# Patient Record
Sex: MALE | Race: WHITE | Employment: OTHER | URBAN - METROPOLITAN AREA
[De-identification: names, ages, dates, MRNs, and addresses within clinical notes are randomized per-mention and may not be internally consistent; named-entity substitution may affect disease eponyms.]

---

## 2017-09-19 ENCOUNTER — HOSPITAL ENCOUNTER (OUTPATIENT)
Dept: MRI IMAGING | Age: 77
Discharge: HOME | End: 2017-09-19
Attending: OPHTHALMOLOGY
Payer: MEDICARE

## 2017-09-19 DIAGNOSIS — H47.293 PALLOR OF OPTIC DISC, BILATERAL: ICD-10-CM

## 2017-09-19 DIAGNOSIS — H53.433 ARCUATE VISUAL FIELD DEFECT, BILATERAL: ICD-10-CM

## 2017-09-19 LAB — CREAT BLD-MCNC: 1.6 MG/DL (ref 0.8–1.5)

## 2017-09-19 PROCEDURE — A9577 INJ MULTIHANCE: HCPCS | Performed by: OPHTHALMOLOGY

## 2017-09-19 PROCEDURE — 82565 ASSAY OF CREATININE: CPT

## 2017-09-19 PROCEDURE — 70553 MRI BRAIN STEM W/O & W/DYE: CPT

## 2017-09-19 PROCEDURE — 74011250636 HC RX REV CODE- 250/636: Performed by: OPHTHALMOLOGY

## 2017-09-19 RX ORDER — SODIUM CHLORIDE 0.9 % (FLUSH) 0.9 %
10 SYRINGE (ML) INJECTION
Status: COMPLETED | OUTPATIENT
Start: 2017-09-19 | End: 2017-09-19

## 2017-09-19 RX ADMIN — GADOBENATE DIMEGLUMINE 15 ML: 529 INJECTION, SOLUTION INTRAVENOUS at 18:50

## 2017-09-19 RX ADMIN — Medication 10 ML: at 18:50

## 2019-12-26 PROBLEM — E27.3 ADRENAL INSUFFICIENCY DUE TO CANCER THERAPY (HCC): Status: ACTIVE | Noted: 2019-12-26

## 2019-12-26 PROBLEM — N18.30 CHRONIC KIDNEY DISEASE, STAGE III (MODERATE) (HCC): Status: ACTIVE | Noted: 2019-12-26

## 2019-12-26 PROBLEM — I25.10 ATHEROSCLEROSIS OF NATIVE CORONARY ARTERY OF NATIVE HEART WITHOUT ANGINA PECTORIS: Status: ACTIVE | Noted: 2019-12-26

## 2020-02-03 PROBLEM — I51.9 LV DYSFUNCTION: Status: ACTIVE | Noted: 2020-02-03

## 2020-02-27 ENCOUNTER — HOSPITAL ENCOUNTER (OUTPATIENT)
Dept: LAB | Age: 80
Discharge: HOME | End: 2020-02-27
Payer: MEDICARE

## 2020-02-27 DIAGNOSIS — N18.30 CHRONIC KIDNEY DISEASE, STAGE III (MODERATE) (HCC): ICD-10-CM

## 2020-02-27 DIAGNOSIS — I25.10 ATHEROSCLEROSIS OF NATIVE CORONARY ARTERY OF NATIVE HEART WITHOUT ANGINA PECTORIS: ICD-10-CM

## 2020-02-27 PROBLEM — R94.39 ABNORMAL CARDIOVASCULAR STRESS TEST: Status: ACTIVE | Noted: 2020-02-27

## 2020-02-27 LAB
ANION GAP SERPL CALC-SCNC: 6 MMOL/L (ref 7–16)
BASOPHILS # BLD: 0 K/UL (ref 0–0.2)
BASOPHILS NFR BLD: 0 % (ref 0–2)
BUN SERPL-MCNC: 27 MG/DL (ref 8–23)
CALCIUM SERPL-MCNC: 9 MG/DL (ref 8.3–10.4)
CHLORIDE SERPL-SCNC: 115 MMOL/L (ref 98–107)
CO2 SERPL-SCNC: 24 MMOL/L (ref 21–32)
CREAT SERPL-MCNC: 1.6 MG/DL (ref 0.8–1.5)
DIFFERENTIAL METHOD BLD: ABNORMAL
EOSINOPHIL # BLD: 0.2 K/UL (ref 0–0.8)
EOSINOPHIL NFR BLD: 2 % (ref 0.5–7.8)
ERYTHROCYTE [DISTWIDTH] IN BLOOD BY AUTOMATED COUNT: 13.9 % (ref 11.9–14.6)
GLUCOSE SERPL-MCNC: 82 MG/DL (ref 65–100)
HCT VFR BLD AUTO: 40.3 % (ref 41.1–50.3)
HGB BLD-MCNC: 13.2 G/DL (ref 13.6–17.2)
IMM GRANULOCYTES # BLD AUTO: 0.1 K/UL (ref 0–0.5)
IMM GRANULOCYTES NFR BLD AUTO: 1 % (ref 0–5)
LYMPHOCYTES # BLD: 0.6 K/UL (ref 0.5–4.6)
LYMPHOCYTES NFR BLD: 7 % (ref 13–44)
MCH RBC QN AUTO: 33.9 PG (ref 26.1–32.9)
MCHC RBC AUTO-ENTMCNC: 32.8 G/DL (ref 31.4–35)
MCV RBC AUTO: 103.6 FL (ref 79.6–97.8)
MONOCYTES # BLD: 0.7 K/UL (ref 0.1–1.3)
MONOCYTES NFR BLD: 8 % (ref 4–12)
NEUTS SEG # BLD: 6.9 K/UL (ref 1.7–8.2)
NEUTS SEG NFR BLD: 83 % (ref 43–78)
NRBC # BLD: 0 K/UL (ref 0–0.2)
PLATELET # BLD AUTO: 166 K/UL (ref 150–450)
PMV BLD AUTO: 10.5 FL (ref 9.4–12.3)
POTASSIUM SERPL-SCNC: 3.6 MMOL/L (ref 3.5–5.1)
RBC # BLD AUTO: 3.89 M/UL (ref 4.23–5.6)
SODIUM SERPL-SCNC: 145 MMOL/L (ref 136–145)
WBC # BLD AUTO: 8.4 K/UL (ref 4.3–11.1)

## 2020-02-27 PROCEDURE — 85025 COMPLETE CBC W/AUTO DIFF WBC: CPT

## 2020-02-27 PROCEDURE — 80048 BASIC METABOLIC PNL TOTAL CA: CPT

## 2020-02-27 PROCEDURE — 36415 COLL VENOUS BLD VENIPUNCTURE: CPT

## 2020-02-27 NOTE — PROGRESS NOTES
Tell him kidney function is not significantly changed. . Tell scheduling he needs to come in few hrs early for IV fluid

## 2020-02-27 NOTE — PROGRESS NOTES
Davidson Garcia at [facility] DT Cath Lab of above. She states pt should arrive at 6:00 am, as scheduled. She states she may move cath to later in the day, after IV hydration. She states pt should increase fluids over the weekend, and he may not need IV hydration. She states they will check creatine when pt arrives for appt. Helena Copiague state she will inform pt of need for hydration, tomorrow. Advised pt of Dr. Loree Melendez instructions and Ning's instructions, as above. Advised pt that Helena Copiague will be calling with further instructions, tomorrow. Pt verbalized understanding. See triage note.

## 2020-02-28 NOTE — PROGRESS NOTES
Patient pre-assessment complete for [facility] poss with Dr Nicolasa Coppola scheduled for 3/2/20 at 8am, arrival time 6am - HYDRATION. Patient verified using . Patient instructed to bring all home medications in labeled bottles on the day of procedure. NPO status reinforced. Patient informed to take a full dose aspirin 325mg  or 81 mg x 4 on the day of procedure. Instructed they can take all other medications excluding vitamins & supplements. Patient verbalizes understanding of all instructions & denies any questions at this time.

## 2020-03-02 ENCOUNTER — HOSPITAL ENCOUNTER (OUTPATIENT)
Dept: CARDIAC CATH/INVASIVE PROCEDURES | Age: 80
Discharge: HOME | End: 2020-03-02
Attending: INTERNAL MEDICINE | Admitting: INTERNAL MEDICINE
Payer: MEDICARE

## 2020-03-02 VITALS
HEART RATE: 81 BPM | HEIGHT: 74 IN | WEIGHT: 185 LBS | SYSTOLIC BLOOD PRESSURE: 127 MMHG | DIASTOLIC BLOOD PRESSURE: 76 MMHG | RESPIRATION RATE: 14 BRPM | OXYGEN SATURATION: 97 % | BODY MASS INDEX: 23.74 KG/M2

## 2020-03-02 LAB
ANION GAP SERPL CALC-SCNC: 7 MMOL/L (ref 7–16)
ATRIAL RATE: 73 BPM
BUN SERPL-MCNC: 29 MG/DL (ref 8–23)
CALCIUM SERPL-MCNC: 8.8 MG/DL (ref 8.3–10.4)
CALCULATED P AXIS, ECG09: 68 DEGREES
CALCULATED R AXIS, ECG10: -65 DEGREES
CALCULATED T AXIS, ECG11: 74 DEGREES
CHLORIDE SERPL-SCNC: 114 MMOL/L (ref 98–107)
CO2 SERPL-SCNC: 21 MMOL/L (ref 21–32)
CREAT SERPL-MCNC: 1.42 MG/DL (ref 0.8–1.5)
DIAGNOSIS, 93000: NORMAL
GLUCOSE SERPL-MCNC: 109 MG/DL (ref 65–100)
P-R INTERVAL, ECG05: 180 MS
POTASSIUM SERPL-SCNC: 3.7 MMOL/L (ref 3.5–5.1)
Q-T INTERVAL, ECG07: 434 MS
QRS DURATION, ECG06: 118 MS
QTC CALCULATION (BEZET), ECG08: 458 MS
SODIUM SERPL-SCNC: 142 MMOL/L (ref 136–145)
VENTRICULAR RATE, ECG03: 67 BPM

## 2020-03-02 PROCEDURE — 74011000250 HC RX REV CODE- 250: Performed by: INTERNAL MEDICINE

## 2020-03-02 PROCEDURE — C1760 CLOSURE DEV, VASC: HCPCS

## 2020-03-02 PROCEDURE — 74011250637 HC RX REV CODE- 250/637: Performed by: INTERNAL MEDICINE

## 2020-03-02 PROCEDURE — 99152 MOD SED SAME PHYS/QHP 5/>YRS: CPT

## 2020-03-02 PROCEDURE — 93459 L HRT ART/GRFT ANGIO: CPT

## 2020-03-02 PROCEDURE — 74011250636 HC RX REV CODE- 250/636: Performed by: INTERNAL MEDICINE

## 2020-03-02 PROCEDURE — 80048 BASIC METABOLIC PNL TOTAL CA: CPT

## 2020-03-02 PROCEDURE — C1894 INTRO/SHEATH, NON-LASER: HCPCS

## 2020-03-02 PROCEDURE — 93005 ELECTROCARDIOGRAM TRACING: CPT | Performed by: INTERNAL MEDICINE

## 2020-03-02 PROCEDURE — 74011636320 HC RX REV CODE- 636/320: Performed by: INTERNAL MEDICINE

## 2020-03-02 PROCEDURE — 77030016699 HC CATH ANGI DX INFN1 CARD -A

## 2020-03-02 RX ORDER — HEPARIN SODIUM 200 [USP'U]/100ML
3 INJECTION, SOLUTION INTRAVENOUS CONTINUOUS
Status: DISCONTINUED | OUTPATIENT
Start: 2020-03-02 | End: 2020-03-02 | Stop reason: HOSPADM

## 2020-03-02 RX ORDER — CARVEDILOL 3.12 MG/1
3.12 TABLET ORAL 2 TIMES DAILY WITH MEALS
Qty: 60 TAB | Refills: 5 | Status: SHIPPED | OUTPATIENT
Start: 2020-03-02 | End: 2020-01-01 | Stop reason: SDUPTHER

## 2020-03-02 RX ORDER — GUAIFENESIN 100 MG/5ML
81 LIQUID (ML) ORAL
Status: SHIPPED | COMMUNITY
Start: 2020-03-02

## 2020-03-02 RX ORDER — SODIUM CHLORIDE 9 MG/ML
75 INJECTION, SOLUTION INTRAVENOUS CONTINUOUS
Status: DISCONTINUED | OUTPATIENT
Start: 2020-03-02 | End: 2020-03-02 | Stop reason: HOSPADM

## 2020-03-02 RX ORDER — CARVEDILOL 3.12 MG/1
3.12 TABLET ORAL 2 TIMES DAILY WITH MEALS
Status: DISCONTINUED | OUTPATIENT
Start: 2020-03-02 | End: 2020-03-02 | Stop reason: HOSPADM

## 2020-03-02 RX ORDER — MIDAZOLAM HYDROCHLORIDE 1 MG/ML
.5-2 INJECTION, SOLUTION INTRAMUSCULAR; INTRAVENOUS
Status: DISCONTINUED | OUTPATIENT
Start: 2020-03-02 | End: 2020-03-02 | Stop reason: HOSPADM

## 2020-03-02 RX ORDER — GUAIFENESIN 100 MG/5ML
81-324 LIQUID (ML) ORAL ONCE
Status: COMPLETED | OUTPATIENT
Start: 2020-03-02 | End: 2020-03-02

## 2020-03-02 RX ORDER — LIDOCAINE HYDROCHLORIDE 10 MG/ML
2-20 INJECTION, SOLUTION EPIDURAL; INFILTRATION; INTRACAUDAL; PERINEURAL ONCE
Status: COMPLETED | OUTPATIENT
Start: 2020-03-02 | End: 2020-03-02

## 2020-03-02 RX ADMIN — HEPARIN SODIUM 3 ML/HR: 5000 INJECTION, SOLUTION INTRAVENOUS; SUBCUTANEOUS at 08:17

## 2020-03-02 RX ADMIN — LIDOCAINE HYDROCHLORIDE 10 ML: 10 INJECTION, SOLUTION EPIDURAL; INFILTRATION; INTRACAUDAL; PERINEURAL at 08:17

## 2020-03-02 RX ADMIN — IOPAMIDOL 90 ML: 755 INJECTION, SOLUTION INTRAVENOUS at 08:31

## 2020-03-02 RX ADMIN — ASPIRIN 81 MG 324 MG: 81 TABLET ORAL at 06:53

## 2020-03-02 RX ADMIN — MIDAZOLAM 2 MG: 1 INJECTION INTRAMUSCULAR; INTRAVENOUS at 08:13

## 2020-03-02 RX ADMIN — SODIUM CHLORIDE 75 ML/HR: 900 INJECTION, SOLUTION INTRAVENOUS at 07:01

## 2020-03-02 NOTE — PROCEDURES
[facility] 
CARDIAC CATH Name:  Kathleen Steele 
MR#:  [id number] :  1940 ACCOUNT #:  [de-identified] DATE OF SERVICE:  2020 PROCEDURES PERFORMED:  Left heart cath, selective coronary angiography, left ventriculogram, saphenous vein angiography x3, LIMA angiography x1 with Angio-Seal closure. PREOPERATIVE DIAGNOSES:  New-onset ischemic cardiomyopathy with known history of coronary disease and abnormal nuclear stress test. 
 
POSTOPERATIVE DIAGNOSES:  Ischemic cardiomyopathy with four of four patent grafts. SURGEON:  Kellen Morrison MD 
 
ASSISTANT:  None ESTIMATED BLOOD LOSS:  3 mL. SPECIMENS REMOVED:  None. COMPLICATIONS:  None. IMPLANTS:  None. ANESTHESIA:  A 2 mg of Versed given from 08:00 to 08:30 by Gay Vargas. ACCESS:  Paulie left 4, Paulie right 4, and straight pigtail were used for diagnostic images. CONTRAST:  A total of 90 mL of contrast was used. HEMODYNAMICS:  Aortic pressure 148/71. LVEDP of 15. FINDINGS:  Left ventriculogram done in ROBLERO projection shows moderate to severely dilated left atrium with segmental wall motion abnormalities and overall EF estimated at approximately 30% with no gradient on pullback. Left main arises normally, bifurcates into LAD and circumflex. Left main is moderate size with no significant disease. LAD arising off the left main, courses for a short distance and then appears to have a severe stenosis after a small diagonal and septal  branch. There is then competitive filling from a patent LIMA. Circumflex artery in the AV groove has a high-grade 90-95% proximal stenosis with some antegrade filling of an OM1 and then washed out from competitive filling from a graft to an OM1. Right coronary artery is totally occluded proximally. This is a .  
 
GRAFT ANATOMY: 
 
LIMA arises off the left subclavian somewhat further down the subclavian artery than usual.  It is a normal-caliber size with no disease. The distal insertion in the LAD is free of disease. There is excellent runoff into the mid-distal LAD to the apex. Saphenous vein bypass graft to OM1 is patent with good proximal and distal anastomosis and good runoff in the OM1 vessel. There is no significant disease in the graft or in the runoff vessel. Saphenous vein bypass graft to OM2 is widely patent with good proximal and distal anastomosis and excellent runoff in the OM2 and backfilling of the native circumflex in a  retrograde fashion. Saphenous vein bypass graft to the right-sided PDA is patent with good proximal and distal anastomosis. This is a large caliber vein graft tying in a very small PDA. The small PDA though fills appropriately, there is backfilling of the distal right and posterior lateral. 
 
CONCLUSIONS:  Stable coronary anatomy with continued patency of four of four bypass grafts. The patient has developed an ischemic cardiomyopathy with EF estimated at 30%. He will be started on systolic heart failure medicines inclusive of Coreg and Entresto. Danni Fonseca MD 
 
 
[id number] 
D:  03/02/2020 8:51 T:  03/02/2020 13:39 
JOB #:  [id number]

## 2020-03-02 NOTE — PROGRESS NOTES
Patient received to [address] [place] [address] room # 10  Ambulatory from [facility]. Patient scheduled for [facility] today with Dr Xavier Ibarra [facility]. Procedure reviewed & questions answered, voiced good understanding consent obtained & placed on chart. All medications and medical history reviewed. Will prep patient per orders. Patient & family updated on plan of care. The patient has a fraility score of 3-MANAGING WELL, based on patient A&Ox3, patient able to ambulate to room without difficulty.

## 2020-03-02 NOTE — PROCEDURES
Cardiac Catheterization Procedure Note Patient ID: 
 
 Name: Lori Jimenez Medical Record Number: [id number] YOB: 1940 Date of Procedure: 3/2/2020 Pre-procedure Diagnosis:  Atypical Angina Post-procedure Diagnosis: Congestive Heart Failure Reason for Procedure: Suspected CAD Blood loss less than 5 ml Sedation. Pt received 2 mg versed and 0 mcg fentanyl for monitored conscious sedation from 800to 830. Nurse terri Specimen: None No complications No assistants Time out, Mallampati, and ASA performed Procedure: After informed consent, patient was prepped and draped in the usual sterile fashion. femoral approach was used. 90cc Visipaque contrast were utilized for the entire procedure. angioseal closure device used FINDINGS Left Ventricle: 30% LVEDP: 15 Left Main:ok Left Anterior descending coronary artery: severe mLAD stenosis Left Circumflex coronary artery: severe prox stenosis Right coronary artery:  prox rca Graft anatomy: lima to lad patent Sv to om1 patent Sv to om2 patent Sv to pda patent Intervention if done: na 
 
Conclusions: 4/4 patent grafts ischemic cmp Recommentations: HFrEF (heart failure reduced EF) meds No complications Family and or significant other were sought out and discussion of the procedure and findings took place. Procedure and findings including pertinent sequele were discussed with the patient immediately post procedure. Opportunity to ask questions was offered. If no one was available in the post procedure waiting area, I can be reached thru paging system or at [phone].  
 
 
 
 
Signed By: Charan Desouza MD

## 2020-03-02 NOTE — PROGRESS NOTES
Report received from [address] [place] [address]. Procedural findings communicated. Intra procedural  medication administration reviewed. Progression of care discussed. Patient received into [id number] [facility] 4 post sheath removal.  
 
Access site without bleeding or swelling Dressing dry and intact Patient instructed to limit movement to right lower extremity Routine post procedural vital signs and site assessment initiated

## 2020-03-02 NOTE — DISCHARGE INSTRUCTIONS
HEART CATHETERIZATION/ANGIOGRAPHY DISCHARGE INSTRUCTIONS    1. Check puncture site frequently for swelling or bleeding. If there is any bleeding, lie down and apply pressure over the area with a clean towel or washcloth. Notify your doctor for any redness, swelling, drainage, or oozing from the puncture site. Notify your doctor for any fever or chills. 2. If the extremity becomes cold, numb, or painful call [place] [facility] at [phone].  3. Activity should be limited for the next 24 hours. Climb stairs as little as possible and avoid any stooping, bending, or strenuous activity for the following 48 hours. No heavy lifting (anything over 10 pounds) for 3 days. No driving for 48 hours. 4. You may resume your usual diet. Drink more fluids than usual.  5. Have a responsible person drive you home and stay with you for at least 24 hours after your heart catheterization/angiography. 6. You may remove bandage from your Right groin in 24 hours. You may shower in 24 hours. No tub baths, hot tubs, or swimming for 1 week. Do not place any lotions, creams, powders, or ointments over puncture site for 1 week. You may place a clean band-aid over the puncture site each day for 5 days. Remove at bedtime. I have read the above instructions and have had the opportunity to ask questions.

## 2020-03-02 NOTE — PROGRESS NOTES
TRANSFER - OUT REPORT: 
 
Verbal report given to [facility] (1-RH), RN(name) on Tyrone Favorite  being transferred to CPRU(unit) for routine progression of care Report consisted of patients Situation, Background, Assessment and  
Recommendations(SBAR). Information from the following report(s) SBAR was reviewed with the receiving nurse. [facility] w/ Dr. Janessa Mcgheek No interventions R groin 6 Fr angioseal 
2 mg versed

## 2020-11-12 PROBLEM — N18.4 CHRONIC RENAL FAILURE, STAGE 4 (SEVERE) (HCC): Status: ACTIVE | Noted: 2020-01-01

## 2020-11-12 PROBLEM — D64.9 ANEMIA: Status: ACTIVE | Noted: 2020-01-01

## 2020-11-12 PROBLEM — I25.5 ISCHEMIC CARDIOMYOPATHY: Status: ACTIVE | Noted: 2020-01-01

## 2021-01-01 ENCOUNTER — APPOINTMENT (OUTPATIENT)
Dept: ULTRASOUND IMAGING | Age: 81
DRG: 501 | End: 2021-01-01
Attending: STUDENT IN AN ORGANIZED HEALTH CARE EDUCATION/TRAINING PROGRAM
Payer: MEDICARE

## 2021-01-01 ENCOUNTER — APPOINTMENT (OUTPATIENT)
Dept: GENERAL RADIOLOGY | Age: 81
DRG: 501 | End: 2021-01-01
Attending: EMERGENCY MEDICINE
Payer: MEDICARE

## 2021-01-01 ENCOUNTER — HOSPITAL ENCOUNTER (INPATIENT)
Age: 81
LOS: 8 days | Discharge: NURSING, REHAB OR LONG-TERM CARE | DRG: 501 | End: 2021-04-08
Attending: EMERGENCY MEDICINE | Admitting: STUDENT IN AN ORGANIZED HEALTH CARE EDUCATION/TRAINING PROGRAM
Payer: MEDICARE

## 2021-01-01 ENCOUNTER — ANESTHESIA EVENT (OUTPATIENT)
Dept: SURGERY | Age: 81
DRG: 501 | End: 2021-01-01
Payer: MEDICARE

## 2021-01-01 ENCOUNTER — PATIENT OUTREACH (OUTPATIENT)
Dept: CASE MANAGEMENT | Age: 81
End: 2021-01-01

## 2021-01-01 ENCOUNTER — ANESTHESIA (OUTPATIENT)
Dept: SURGERY | Age: 81
DRG: 501 | End: 2021-01-01
Payer: MEDICARE

## 2021-01-01 ENCOUNTER — APPOINTMENT (OUTPATIENT)
Dept: GENERAL RADIOLOGY | Age: 81
End: 2021-01-01
Attending: EMERGENCY MEDICINE
Payer: MEDICARE

## 2021-01-01 ENCOUNTER — HOSPITAL ENCOUNTER (OUTPATIENT)
Dept: LAB | Age: 81
Discharge: HOME | End: 2021-04-11

## 2021-01-01 ENCOUNTER — HOSPITAL ENCOUNTER (EMERGENCY)
Age: 81
Discharge: HOME | End: 2021-03-08
Attending: EMERGENCY MEDICINE
Payer: MEDICARE

## 2021-01-01 VITALS
WEIGHT: 170 LBS | TEMPERATURE: 97.9 F | BODY MASS INDEX: 21.14 KG/M2 | RESPIRATION RATE: 19 BRPM | DIASTOLIC BLOOD PRESSURE: 66 MMHG | SYSTOLIC BLOOD PRESSURE: 131 MMHG | HEART RATE: 89 BPM | HEIGHT: 75 IN | OXYGEN SATURATION: 96 %

## 2021-01-01 VITALS
BODY MASS INDEX: 20.39 KG/M2 | TEMPERATURE: 98 F | WEIGHT: 164 LBS | RESPIRATION RATE: 16 BRPM | SYSTOLIC BLOOD PRESSURE: 135 MMHG | HEIGHT: 75 IN | OXYGEN SATURATION: 100 % | HEART RATE: 74 BPM | DIASTOLIC BLOOD PRESSURE: 67 MMHG

## 2021-01-01 DIAGNOSIS — N18.4 ACUTE RENAL FAILURE SUPERIMPOSED ON STAGE 4 CHRONIC KIDNEY DISEASE, UNSPECIFIED ACUTE RENAL FAILURE TYPE (HCC): Primary | ICD-10-CM

## 2021-01-01 DIAGNOSIS — I25.5 ISCHEMIC CARDIOMYOPATHY: ICD-10-CM

## 2021-01-01 DIAGNOSIS — T14.8XXA MULTIPLE SKIN TEARS: Primary | ICD-10-CM

## 2021-01-01 DIAGNOSIS — R09.89 RALES: ICD-10-CM

## 2021-01-01 DIAGNOSIS — I50.43 ACUTE ON CHRONIC COMBINED SYSTOLIC AND DIASTOLIC CONGESTIVE HEART FAILURE (HCC): ICD-10-CM

## 2021-01-01 DIAGNOSIS — I48.0 PAF (PAROXYSMAL ATRIAL FIBRILLATION) (HCC): ICD-10-CM

## 2021-01-01 DIAGNOSIS — M71.10 SEPTIC BURSITIS: ICD-10-CM

## 2021-01-01 DIAGNOSIS — J84.10 PULMONARY INTERSTITIAL FIBROSIS (HCC): ICD-10-CM

## 2021-01-01 DIAGNOSIS — J84.10 PULMONARY FIBROSIS (HCC): ICD-10-CM

## 2021-01-01 DIAGNOSIS — Z95.1 S/P CABG X 4: ICD-10-CM

## 2021-01-01 DIAGNOSIS — N17.9 ACUTE RENAL FAILURE SUPERIMPOSED ON STAGE 4 CHRONIC KIDNEY DISEASE, UNSPECIFIED ACUTE RENAL FAILURE TYPE (HCC): Primary | ICD-10-CM

## 2021-01-01 DIAGNOSIS — S50.01XA TRAUMATIC HEMATOMA OF RIGHT ELBOW, INITIAL ENCOUNTER: ICD-10-CM

## 2021-01-01 DIAGNOSIS — M25.561 ACUTE PAIN OF RIGHT KNEE: ICD-10-CM

## 2021-01-01 DIAGNOSIS — I95.1 ORTHOSTATIC HYPOTENSION: ICD-10-CM

## 2021-01-01 DIAGNOSIS — N17.9 AKI (ACUTE KIDNEY INJURY) (HCC): ICD-10-CM

## 2021-01-01 LAB
ALBUMIN SERPL-MCNC: 1.9 G/DL (ref 3.2–4.6)
ALBUMIN SERPL-MCNC: 1.9 G/DL (ref 3.2–4.6)
ALBUMIN SERPL-MCNC: 2.1 G/DL (ref 3.2–4.6)
ALBUMIN SERPL-MCNC: 2.5 G/DL (ref 3.2–4.6)
ALBUMIN/GLOB SERPL: 0.8 {RATIO} (ref 1.2–3.5)
ALBUMIN/GLOB SERPL: 0.8 {RATIO} (ref 1.2–3.5)
ALP SERPL-CCNC: 62 U/L (ref 50–136)
ALP SERPL-CCNC: 76 U/L (ref 50–136)
ALT SERPL-CCNC: 20 U/L (ref 12–65)
ALT SERPL-CCNC: 37 U/L (ref 12–65)
ANION GAP SERPL CALC-SCNC: 10 MMOL/L (ref 7–16)
ANION GAP SERPL CALC-SCNC: 10 MMOL/L (ref 7–16)
ANION GAP SERPL CALC-SCNC: 11 MMOL/L (ref 7–16)
ANION GAP SERPL CALC-SCNC: 12 MMOL/L (ref 7–16)
ANION GAP SERPL CALC-SCNC: 13 MMOL/L (ref 7–16)
ANION GAP SERPL CALC-SCNC: 7 MMOL/L (ref 7–16)
ANION GAP SERPL CALC-SCNC: 8 MMOL/L (ref 7–16)
ANION GAP SERPL CALC-SCNC: 8 MMOL/L (ref 7–16)
APPEARANCE UR: CLEAR
APPEARANCE UR: CLEAR
AST SERPL-CCNC: 13 U/L (ref 15–37)
AST SERPL-CCNC: 39 U/L (ref 15–37)
ATRIAL RATE: 50 BPM
BACTERIA SPEC CULT: ABNORMAL
BACTERIA SPEC CULT: ABNORMAL
BACTERIA SPEC CULT: NORMAL
BACTERIA URNS QL MICRO: 0 /HPF
BASOPHILS # BLD: 0 K/UL (ref 0–0.2)
BASOPHILS NFR BLD: 0 % (ref 0–2)
BILIRUB DIRECT SERPL-MCNC: 0.1 MG/DL
BILIRUB SERPL-MCNC: 0.3 MG/DL (ref 0.2–1.1)
BILIRUB SERPL-MCNC: 0.8 MG/DL (ref 0.2–1.1)
BILIRUB UR QL: NEGATIVE
BILIRUB UR QL: NEGATIVE
BNP SERPL-MCNC: ABNORMAL PG/ML
BUN SERPL-MCNC: 38 MG/DL (ref 8–23)
BUN SERPL-MCNC: 43 MG/DL (ref 8–23)
BUN SERPL-MCNC: 45 MG/DL (ref 8–23)
BUN SERPL-MCNC: 47 MG/DL (ref 8–23)
BUN SERPL-MCNC: 48 MG/DL (ref 8–23)
BUN SERPL-MCNC: 54 MG/DL (ref 8–23)
BUN SERPL-MCNC: 54 MG/DL (ref 8–23)
BUN SERPL-MCNC: 58 MG/DL (ref 8–23)
BUN SERPL-MCNC: 59 MG/DL (ref 8–23)
BUN SERPL-MCNC: 66 MG/DL (ref 8–23)
BUN SERPL-MCNC: 71 MG/DL (ref 8–23)
BUN SERPL-MCNC: 77 MG/DL (ref 8–23)
BUN SERPL-MCNC: 80 MG/DL (ref 8–23)
CALCIUM SERPL-MCNC: 7.7 MG/DL (ref 8.3–10.4)
CALCIUM SERPL-MCNC: 7.8 MG/DL (ref 8.3–10.4)
CALCIUM SERPL-MCNC: 7.9 MG/DL (ref 8.3–10.4)
CALCIUM SERPL-MCNC: 8 MG/DL (ref 8.3–10.4)
CALCIUM SERPL-MCNC: 8 MG/DL (ref 8.3–10.4)
CALCIUM SERPL-MCNC: 8.1 MG/DL (ref 8.3–10.4)
CALCIUM SERPL-MCNC: 8.1 MG/DL (ref 8.3–10.4)
CALCIUM SERPL-MCNC: 8.4 MG/DL (ref 8.3–10.4)
CALCULATED R AXIS, ECG10: -54 DEGREES
CALCULATED T AXIS, ECG11: 109 DEGREES
CASTS URNS QL MICRO: ABNORMAL /LPF
CHLORIDE SERPL-SCNC: 116 MMOL/L (ref 98–107)
CHLORIDE SERPL-SCNC: 116 MMOL/L (ref 98–107)
CHLORIDE SERPL-SCNC: 117 MMOL/L (ref 98–107)
CHLORIDE SERPL-SCNC: 118 MMOL/L (ref 98–107)
CHLORIDE SERPL-SCNC: 119 MMOL/L (ref 98–107)
CHLORIDE SERPL-SCNC: 120 MMOL/L (ref 98–107)
CHLORIDE SERPL-SCNC: 121 MMOL/L (ref 98–107)
CHLORIDE SERPL-SCNC: 122 MMOL/L (ref 98–107)
CO2 SERPL-SCNC: 13 MMOL/L (ref 21–32)
CO2 SERPL-SCNC: 14 MMOL/L (ref 21–32)
CO2 SERPL-SCNC: 15 MMOL/L (ref 21–32)
CO2 SERPL-SCNC: 16 MMOL/L (ref 21–32)
CO2 SERPL-SCNC: 18 MMOL/L (ref 21–32)
COLOR UR: YELLOW
COLOR UR: YELLOW
COVID-19 RAPID TEST, COVR: NOT DETECTED
CREAT SERPL-MCNC: 2.62 MG/DL (ref 0.8–1.5)
CREAT SERPL-MCNC: 2.73 MG/DL (ref 0.8–1.5)
CREAT SERPL-MCNC: 2.76 MG/DL (ref 0.8–1.5)
CREAT SERPL-MCNC: 2.85 MG/DL (ref 0.8–1.5)
CREAT SERPL-MCNC: 2.94 MG/DL (ref 0.8–1.5)
CREAT SERPL-MCNC: 3.22 MG/DL (ref 0.8–1.5)
CREAT SERPL-MCNC: 3.22 MG/DL (ref 0.8–1.5)
CREAT SERPL-MCNC: 3.39 MG/DL (ref 0.8–1.5)
CREAT SERPL-MCNC: 3.5 MG/DL (ref 0.8–1.5)
CREAT SERPL-MCNC: 3.97 MG/DL (ref 0.8–1.5)
CREAT SERPL-MCNC: 4.18 MG/DL (ref 0.8–1.5)
CREAT SERPL-MCNC: 4.83 MG/DL (ref 0.8–1.5)
CREAT SERPL-MCNC: 5.16 MG/DL (ref 0.8–1.5)
CREAT UR-MCNC: 50.2 MG/DL
DIAGNOSIS, 93000: NORMAL
DIFFERENTIAL METHOD BLD: ABNORMAL
EOSINOPHIL # BLD: 0 K/UL (ref 0–0.8)
EOSINOPHIL # BLD: 0.1 K/UL (ref 0–0.8)
EOSINOPHIL # BLD: 0.2 K/UL (ref 0–0.8)
EOSINOPHIL # BLD: 0.3 K/UL (ref 0–0.8)
EOSINOPHIL # BLD: 0.3 K/UL (ref 0–0.8)
EOSINOPHIL NFR BLD: 0 % (ref 0.5–7.8)
EOSINOPHIL NFR BLD: 1 % (ref 0.5–7.8)
EOSINOPHIL NFR BLD: 2 % (ref 0.5–7.8)
EPI CELLS #/AREA URNS HPF: ABNORMAL /HPF
ERYTHROCYTE [DISTWIDTH] IN BLOOD BY AUTOMATED COUNT: 17.2 % (ref 11.9–14.6)
ERYTHROCYTE [DISTWIDTH] IN BLOOD BY AUTOMATED COUNT: 17.7 % (ref 11.9–14.6)
ERYTHROCYTE [DISTWIDTH] IN BLOOD BY AUTOMATED COUNT: 17.8 % (ref 11.9–14.6)
ERYTHROCYTE [DISTWIDTH] IN BLOOD BY AUTOMATED COUNT: 17.8 % (ref 11.9–14.6)
ERYTHROCYTE [DISTWIDTH] IN BLOOD BY AUTOMATED COUNT: 17.9 % (ref 11.9–14.6)
ERYTHROCYTE [DISTWIDTH] IN BLOOD BY AUTOMATED COUNT: 18.4 % (ref 11.9–14.6)
FERRITIN SERPL-MCNC: 382 NG/ML (ref 8–388)
FOLATE SERPL-MCNC: >100 NG/ML (ref 3.1–17.5)
GLOBULIN SER CALC-MCNC: 2.7 G/DL (ref 2.3–3.5)
GLOBULIN SER CALC-MCNC: 3.3 G/DL (ref 2.3–3.5)
GLUCOSE SERPL-MCNC: 108 MG/DL (ref 65–100)
GLUCOSE SERPL-MCNC: 112 MG/DL (ref 65–100)
GLUCOSE SERPL-MCNC: 138 MG/DL (ref 65–100)
GLUCOSE SERPL-MCNC: 138 MG/DL (ref 65–100)
GLUCOSE SERPL-MCNC: 144 MG/DL (ref 65–100)
GLUCOSE SERPL-MCNC: 148 MG/DL (ref 65–100)
GLUCOSE SERPL-MCNC: 153 MG/DL (ref 65–100)
GLUCOSE SERPL-MCNC: 160 MG/DL (ref 65–100)
GLUCOSE SERPL-MCNC: 162 MG/DL (ref 65–100)
GLUCOSE SERPL-MCNC: 76 MG/DL (ref 65–100)
GLUCOSE SERPL-MCNC: 83 MG/DL (ref 65–100)
GLUCOSE SERPL-MCNC: 83 MG/DL (ref 65–100)
GLUCOSE SERPL-MCNC: 86 MG/DL (ref 65–100)
GLUCOSE UR STRIP.AUTO-MCNC: NEGATIVE MG/DL
GLUCOSE UR STRIP.AUTO-MCNC: NEGATIVE MG/DL
GRAM STN SPEC: ABNORMAL
HCT VFR BLD AUTO: 25.5 % (ref 41.1–50.3)
HCT VFR BLD AUTO: 26.1 % (ref 41.1–50.3)
HCT VFR BLD AUTO: 26.2 % (ref 41.1–50.3)
HCT VFR BLD AUTO: 26.4 % (ref 41.1–50.3)
HCT VFR BLD AUTO: 26.9 % (ref 41.1–50.3)
HCT VFR BLD AUTO: 27.2 % (ref 41.1–50.3)
HCT VFR BLD AUTO: 29 % (ref 41.1–50.3)
HCT VFR BLD AUTO: 31.3 % (ref 41.1–50.3)
HGB BLD-MCNC: 10 G/DL (ref 13.6–17.2)
HGB BLD-MCNC: 8.2 G/DL (ref 13.6–17.2)
HGB BLD-MCNC: 8.3 G/DL (ref 13.6–17.2)
HGB BLD-MCNC: 8.3 G/DL (ref 13.6–17.2)
HGB BLD-MCNC: 8.4 G/DL (ref 13.6–17.2)
HGB BLD-MCNC: 8.4 G/DL (ref 13.6–17.2)
HGB BLD-MCNC: 8.8 G/DL (ref 13.6–17.2)
HGB BLD-MCNC: 8.9 G/DL (ref 13.6–17.2)
HGB UR QL STRIP: ABNORMAL
HGB UR QL STRIP: NEGATIVE
IMM GRANULOCYTES # BLD AUTO: 0.2 K/UL (ref 0–0.5)
IMM GRANULOCYTES # BLD AUTO: 0.3 K/UL (ref 0–0.5)
IMM GRANULOCYTES # BLD AUTO: 0.4 K/UL (ref 0–0.5)
IMM GRANULOCYTES NFR BLD AUTO: 2 % (ref 0–5)
IMM GRANULOCYTES NFR BLD AUTO: 3 % (ref 0–5)
IRON SATN MFR SERPL: 12 %
IRON SERPL-MCNC: 15 UG/DL (ref 35–150)
KETONES UR QL STRIP.AUTO: NEGATIVE MG/DL
KETONES UR QL STRIP.AUTO: NEGATIVE MG/DL
LACTATE SERPL-SCNC: 1.1 MMOL/L (ref 0.4–2)
LEUKOCYTE ESTERASE UR QL STRIP.AUTO: NEGATIVE
LEUKOCYTE ESTERASE UR QL STRIP.AUTO: NEGATIVE
LYMPHOCYTES # BLD: 0.2 K/UL (ref 0.5–4.6)
LYMPHOCYTES # BLD: 0.3 K/UL (ref 0.5–4.6)
LYMPHOCYTES NFR BLD: 2 % (ref 13–44)
MAGNESIUM SERPL-MCNC: 1.6 MG/DL (ref 1.8–2.4)
MAGNESIUM SERPL-MCNC: 1.7 MG/DL (ref 1.8–2.4)
MAGNESIUM SERPL-MCNC: 1.9 MG/DL (ref 1.8–2.4)
MAGNESIUM SERPL-MCNC: 1.9 MG/DL (ref 1.8–2.4)
MAGNESIUM SERPL-MCNC: 2 MG/DL (ref 1.8–2.4)
MCH RBC QN AUTO: 33.2 PG (ref 26.1–32.9)
MCH RBC QN AUTO: 33.3 PG (ref 26.1–32.9)
MCH RBC QN AUTO: 33.6 PG (ref 26.1–32.9)
MCH RBC QN AUTO: 33.7 PG (ref 26.1–32.9)
MCH RBC QN AUTO: 34.1 PG (ref 26.1–32.9)
MCH RBC QN AUTO: 34.2 PG (ref 26.1–32.9)
MCHC RBC AUTO-ENTMCNC: 30.7 G/DL (ref 31.4–35)
MCHC RBC AUTO-ENTMCNC: 31.2 G/DL (ref 31.4–35)
MCHC RBC AUTO-ENTMCNC: 31.7 G/DL (ref 31.4–35)
MCHC RBC AUTO-ENTMCNC: 31.8 G/DL (ref 31.4–35)
MCHC RBC AUTO-ENTMCNC: 31.8 G/DL (ref 31.4–35)
MCHC RBC AUTO-ENTMCNC: 31.9 G/DL (ref 31.4–35)
MCHC RBC AUTO-ENTMCNC: 32.2 G/DL (ref 31.4–35)
MCHC RBC AUTO-ENTMCNC: 32.4 G/DL (ref 31.4–35)
MCV RBC AUTO: 103.8 FL (ref 79.6–97.8)
MCV RBC AUTO: 105.7 FL (ref 79.6–97.8)
MCV RBC AUTO: 106 FL (ref 79.6–97.8)
MCV RBC AUTO: 106.1 FL (ref 79.6–97.8)
MCV RBC AUTO: 106.3 FL (ref 79.6–97.8)
MCV RBC AUTO: 106.7 FL (ref 79.6–97.8)
MCV RBC AUTO: 106.8 FL (ref 79.6–97.8)
MCV RBC AUTO: 108.2 FL (ref 79.6–97.8)
MM INDURATION POC: 0 MM (ref 0–5)
MM INDURATION POC: 0 MM (ref 0–5)
MONOCYTES # BLD: 0.5 K/UL (ref 0.1–1.3)
MONOCYTES # BLD: 0.6 K/UL (ref 0.1–1.3)
MONOCYTES # BLD: 0.6 K/UL (ref 0.1–1.3)
MONOCYTES # BLD: 0.7 K/UL (ref 0.1–1.3)
MONOCYTES # BLD: 0.7 K/UL (ref 0.1–1.3)
MONOCYTES NFR BLD: 4 % (ref 4–12)
MONOCYTES NFR BLD: 4 % (ref 4–12)
MONOCYTES NFR BLD: 5 % (ref 4–12)
MONOCYTES NFR BLD: 5 % (ref 4–12)
MONOCYTES NFR BLD: 6 % (ref 4–12)
NEUTS SEG # BLD: 11.1 K/UL (ref 1.7–8.2)
NEUTS SEG # BLD: 11.3 K/UL (ref 1.7–8.2)
NEUTS SEG # BLD: 11.7 K/UL (ref 1.7–8.2)
NEUTS SEG # BLD: 12.4 K/UL (ref 1.7–8.2)
NEUTS SEG # BLD: 12.9 K/UL (ref 1.7–8.2)
NEUTS SEG NFR BLD: 88 % (ref 43–78)
NEUTS SEG NFR BLD: 90 % (ref 43–78)
NEUTS SEG NFR BLD: 91 % (ref 43–78)
NITRITE UR QL STRIP.AUTO: NEGATIVE
NITRITE UR QL STRIP.AUTO: NEGATIVE
NRBC # BLD: 0 K/UL (ref 0–0.2)
NRBC # BLD: 0.02 K/UL (ref 0–0.2)
NRBC # BLD: 0.04 K/UL (ref 0–0.2)
NRBC # BLD: 0.04 K/UL (ref 0–0.2)
NRBC # BLD: 0.05 K/UL (ref 0–0.2)
PH UR STRIP: 5.5 [PH] (ref 5–9)
PH UR STRIP: 5.5 [PH] (ref 5–9)
PHOSPHATE SERPL-MCNC: 2.2 MG/DL (ref 2.3–3.7)
PHOSPHATE SERPL-MCNC: 3.2 MG/DL (ref 2.3–3.7)
PHOSPHATE SERPL-MCNC: 5.8 MG/DL (ref 2.3–3.7)
PLATELET # BLD AUTO: 101 K/UL (ref 150–450)
PLATELET # BLD AUTO: 111 K/UL (ref 150–450)
PLATELET # BLD AUTO: 111 K/UL (ref 150–450)
PLATELET # BLD AUTO: 117 K/UL (ref 150–450)
PLATELET # BLD AUTO: 89 K/UL (ref 150–450)
PLATELET # BLD AUTO: 90 K/UL (ref 150–450)
PLATELET # BLD AUTO: 95 K/UL (ref 150–450)
PLATELET # BLD AUTO: 98 K/UL (ref 150–450)
PMV BLD AUTO: 12 FL (ref 9.4–12.3)
PMV BLD AUTO: 12.1 FL (ref 9.4–12.3)
PMV BLD AUTO: 12.2 FL (ref 9.4–12.3)
PMV BLD AUTO: 12.3 FL (ref 9.4–12.3)
PMV BLD AUTO: 12.5 FL (ref 9.4–12.3)
PMV BLD AUTO: 12.5 FL (ref 9.4–12.3)
PMV BLD AUTO: 12.6 FL (ref 9.4–12.3)
PMV BLD AUTO: 12.9 FL (ref 9.4–12.3)
POTASSIUM SERPL-SCNC: 2.5 MMOL/L (ref 3.5–5.1)
POTASSIUM SERPL-SCNC: 3.1 MMOL/L (ref 3.5–5.1)
POTASSIUM SERPL-SCNC: 3.2 MMOL/L (ref 3.5–5.1)
POTASSIUM SERPL-SCNC: 3.2 MMOL/L (ref 3.5–5.1)
POTASSIUM SERPL-SCNC: 3.3 MMOL/L (ref 3.5–5.1)
POTASSIUM SERPL-SCNC: 3.4 MMOL/L (ref 3.5–5.1)
POTASSIUM SERPL-SCNC: 3.5 MMOL/L (ref 3.5–5.1)
POTASSIUM SERPL-SCNC: 3.6 MMOL/L (ref 3.5–5.1)
POTASSIUM SERPL-SCNC: 3.7 MMOL/L (ref 3.5–5.1)
POTASSIUM SERPL-SCNC: 4 MMOL/L (ref 3.5–5.1)
POTASSIUM SERPL-SCNC: 4.2 MMOL/L (ref 3.5–5.1)
POTASSIUM SERPL-SCNC: 4.5 MMOL/L (ref 3.5–5.1)
PPD POC: NEGATIVE NEGATIVE
PPD POC: NEGATIVE NEGATIVE
PROCALCITONIN SERPL-MCNC: 0.54 NG/ML
PROT SERPL-MCNC: 4.8 G/DL (ref 6.3–8.2)
PROT SERPL-MCNC: 5.8 G/DL (ref 6.3–8.2)
PROT UR STRIP-MCNC: NEGATIVE MG/DL
PROT UR STRIP-MCNC: NEGATIVE MG/DL
Q-T INTERVAL, ECG07: 390 MS
QRS DURATION, ECG06: 138 MS
QTC CALCULATION (BEZET), ECG08: 492 MS
RBC # BLD AUTO: 2.4 M/UL (ref 4.23–5.6)
RBC # BLD AUTO: 2.47 M/UL (ref 4.23–5.6)
RBC # BLD AUTO: 2.47 M/UL (ref 4.23–5.6)
RBC # BLD AUTO: 2.49 M/UL (ref 4.23–5.6)
RBC # BLD AUTO: 2.52 M/UL (ref 4.23–5.6)
RBC # BLD AUTO: 2.62 M/UL (ref 4.23–5.6)
RBC # BLD AUTO: 2.68 M/UL (ref 4.23–5.6)
RBC # BLD AUTO: 2.93 M/UL (ref 4.23–5.6)
RBC #/AREA URNS HPF: ABNORMAL /HPF
SERVICE CMNT-IMP: ABNORMAL
SERVICE CMNT-IMP: ABNORMAL
SERVICE CMNT-IMP: NORMAL
SODIUM SERPL-SCNC: 141 MMOL/L (ref 138–145)
SODIUM SERPL-SCNC: 142 MMOL/L (ref 138–145)
SODIUM SERPL-SCNC: 143 MMOL/L (ref 136–145)
SODIUM SERPL-SCNC: 143 MMOL/L (ref 136–145)
SODIUM SERPL-SCNC: 143 MMOL/L (ref 138–145)
SODIUM SERPL-SCNC: 144 MMOL/L (ref 138–145)
SODIUM SERPL-SCNC: 145 MMOL/L (ref 138–145)
SODIUM SERPL-SCNC: 146 MMOL/L (ref 136–145)
SODIUM UR-SCNC: 36 MMOL/L
SOURCE, COVRS: NORMAL
SP GR UR REFRACTOMETRY: 1.01 (ref 1–1.02)
SP GR UR REFRACTOMETRY: 1.01 (ref 1–1.02)
T4 FREE SERPL-MCNC: 0.8 NG/DL (ref 0.78–1.46)
TIBC SERPL-MCNC: 130 UG/DL (ref 250–450)
TROPONIN-HIGH SENSITIVITY: 718.4 PG/ML (ref 0–14)
TROPONIN-HIGH SENSITIVITY: 767.1 PG/ML (ref 0–14)
TSH SERPL DL<=0.005 MIU/L-ACNC: 0.74 UIU/ML (ref 0.36–3.74)
UROBILINOGEN UR QL STRIP.AUTO: 0.2 EU/DL (ref 0.2–1)
UROBILINOGEN UR QL STRIP.AUTO: 0.2 EU/DL (ref 0.2–1)
VENTRICULAR RATE, ECG03: 96 BPM
VIT B12 SERPL-MCNC: 780 PG/ML (ref 193–986)
WBC # BLD AUTO: 10.4 K/UL (ref 4.3–11.1)
WBC # BLD AUTO: 11.7 K/UL (ref 4.3–11.1)
WBC # BLD AUTO: 12.4 K/UL (ref 4.3–11.1)
WBC # BLD AUTO: 12.6 K/UL (ref 4.3–11.1)
WBC # BLD AUTO: 12.9 K/UL (ref 4.3–11.1)
WBC # BLD AUTO: 13 K/UL (ref 4.3–11.1)
WBC # BLD AUTO: 13.9 K/UL (ref 4.3–11.1)
WBC # BLD AUTO: 14.2 K/UL (ref 4.3–11.1)
WBC URNS QL MICRO: ABNORMAL /HPF

## 2021-01-01 PROCEDURE — 99232 SBSQ HOSP IP/OBS MODERATE 35: CPT | Performed by: INTERNAL MEDICINE

## 2021-01-01 PROCEDURE — 99232 SBSQ HOSP IP/OBS MODERATE 35: CPT | Performed by: PHYSICIAN ASSISTANT

## 2021-01-01 PROCEDURE — 74011250637 HC RX REV CODE- 250/637: Performed by: ORTHOPAEDIC SURGERY

## 2021-01-01 PROCEDURE — 81003 URINALYSIS AUTO W/O SCOPE: CPT

## 2021-01-01 PROCEDURE — 85025 COMPLETE CBC W/AUTO DIFF WBC: CPT

## 2021-01-01 PROCEDURE — 74011250636 HC RX REV CODE- 250/636: Performed by: INTERNAL MEDICINE

## 2021-01-01 PROCEDURE — 85027 COMPLETE CBC AUTOMATED: CPT

## 2021-01-01 PROCEDURE — 0M9N0ZZ DRAINAGE OF RIGHT KNEE BURSA AND LIGAMENT, OPEN APPROACH: ICD-10-PCS | Performed by: ORTHOPAEDIC SURGERY

## 2021-01-01 PROCEDURE — 74011000250 HC RX REV CODE- 250: Performed by: INTERNAL MEDICINE

## 2021-01-01 PROCEDURE — 74011250637 HC RX REV CODE- 250/637: Performed by: INTERNAL MEDICINE

## 2021-01-01 PROCEDURE — 90471 IMMUNIZATION ADMIN: CPT

## 2021-01-01 PROCEDURE — 90715 TDAP VACCINE 7 YRS/> IM: CPT | Performed by: EMERGENCY MEDICINE

## 2021-01-01 PROCEDURE — 97535 SELF CARE MNGMENT TRAINING: CPT

## 2021-01-01 PROCEDURE — 36415 COLL VENOUS BLD VENIPUNCTURE: CPT

## 2021-01-01 PROCEDURE — 74011250636 HC RX REV CODE- 250/636: Performed by: NURSE ANESTHETIST, CERTIFIED REGISTERED

## 2021-01-01 PROCEDURE — 99233 SBSQ HOSP IP/OBS HIGH 50: CPT | Performed by: INTERNAL MEDICINE

## 2021-01-01 PROCEDURE — P9047 ALBUMIN (HUMAN), 25%, 50ML: HCPCS | Performed by: STUDENT IN AN ORGANIZED HEALTH CARE EDUCATION/TRAINING PROGRAM

## 2021-01-01 PROCEDURE — 74011250636 HC RX REV CODE- 250/636: Performed by: ORTHOPAEDIC SURGERY

## 2021-01-01 PROCEDURE — 84145 PROCALCITONIN (PCT): CPT

## 2021-01-01 PROCEDURE — 2709999900 HC NON-CHARGEABLE SUPPLY

## 2021-01-01 PROCEDURE — 82746 ASSAY OF FOLIC ACID SERUM: CPT

## 2021-01-01 PROCEDURE — 80048 BASIC METABOLIC PNL TOTAL CA: CPT

## 2021-01-01 PROCEDURE — 77030008462 HC STPLR SKN PROX J&J -A: Performed by: ORTHOPAEDIC SURGERY

## 2021-01-01 PROCEDURE — 76210000006 HC OR PH I REC 0.5 TO 1 HR: Performed by: ORTHOPAEDIC SURGERY

## 2021-01-01 PROCEDURE — 20605 DRAIN/INJ JOINT/BURSA W/O US: CPT | Performed by: ORTHOPAEDIC SURGERY

## 2021-01-01 PROCEDURE — 77030040393 HC DRSG OPTIFOAM GENT MDII -B

## 2021-01-01 PROCEDURE — 82607 VITAMIN B-12: CPT

## 2021-01-01 PROCEDURE — 87635 SARS-COV-2 COVID-19 AMP PRB: CPT

## 2021-01-01 PROCEDURE — 77010033678 HC OXYGEN DAILY

## 2021-01-01 PROCEDURE — 87077 CULTURE AEROBIC IDENTIFY: CPT

## 2021-01-01 PROCEDURE — 65270000029 HC RM PRIVATE

## 2021-01-01 PROCEDURE — 84300 ASSAY OF URINE SODIUM: CPT

## 2021-01-01 PROCEDURE — 84484 ASSAY OF TROPONIN QUANT: CPT

## 2021-01-01 PROCEDURE — 83735 ASSAY OF MAGNESIUM: CPT

## 2021-01-01 PROCEDURE — 84439 ASSAY OF FREE THYROXINE: CPT

## 2021-01-01 PROCEDURE — 74011000250 HC RX REV CODE- 250: Performed by: ORTHOPAEDIC SURGERY

## 2021-01-01 PROCEDURE — 84100 ASSAY OF PHOSPHORUS: CPT

## 2021-01-01 PROCEDURE — 74011000258 HC RX REV CODE- 258: Performed by: ORTHOPAEDIC SURGERY

## 2021-01-01 PROCEDURE — 74011250637 HC RX REV CODE- 250/637: Performed by: PHYSICIAN ASSISTANT

## 2021-01-01 PROCEDURE — 77030003666 HC NDL SPINAL BD -A: Performed by: ORTHOPAEDIC SURGERY

## 2021-01-01 PROCEDURE — 87075 CULTR BACTERIA EXCEPT BLOOD: CPT

## 2021-01-01 PROCEDURE — 97163 PT EVAL HIGH COMPLEX 45 MIN: CPT

## 2021-01-01 PROCEDURE — 74011250636 HC RX REV CODE- 250/636: Performed by: NURSE PRACTITIONER

## 2021-01-01 PROCEDURE — 84132 ASSAY OF SERUM POTASSIUM: CPT

## 2021-01-01 PROCEDURE — 74011250636 HC RX REV CODE- 250/636: Performed by: STUDENT IN AN ORGANIZED HEALTH CARE EDUCATION/TRAINING PROGRAM

## 2021-01-01 PROCEDURE — 73080 X-RAY EXAM OF ELBOW: CPT

## 2021-01-01 PROCEDURE — 80076 HEPATIC FUNCTION PANEL: CPT

## 2021-01-01 PROCEDURE — 80069 RENAL FUNCTION PANEL: CPT

## 2021-01-01 PROCEDURE — 77030017016 HC DSG ANTIMIC BARR2 S&N -B: Performed by: ORTHOPAEDIC SURGERY

## 2021-01-01 PROCEDURE — 87040 BLOOD CULTURE FOR BACTERIA: CPT

## 2021-01-01 PROCEDURE — 83540 ASSAY OF IRON: CPT

## 2021-01-01 PROCEDURE — 76060000032 HC ANESTHESIA 0.5 TO 1 HR: Performed by: ORTHOPAEDIC SURGERY

## 2021-01-01 PROCEDURE — 74011000302 HC RX REV CODE- 302: Performed by: INTERNAL MEDICINE

## 2021-01-01 PROCEDURE — 87186 SC STD MICRODIL/AGAR DIL: CPT

## 2021-01-01 PROCEDURE — 77030003704 HC NDL VASC ACC ARMD -A: Performed by: ORTHOPAEDIC SURGERY

## 2021-01-01 PROCEDURE — 97530 THERAPEUTIC ACTIVITIES: CPT

## 2021-01-01 PROCEDURE — 74011250637 HC RX REV CODE- 250/637: Performed by: STUDENT IN AN ORGANIZED HEALTH CARE EDUCATION/TRAINING PROGRAM

## 2021-01-01 PROCEDURE — 27301 DRAIN THIGH/KNEE LESION: CPT | Performed by: ORTHOPAEDIC SURGERY

## 2021-01-01 PROCEDURE — 83880 ASSAY OF NATRIURETIC PEPTIDE: CPT

## 2021-01-01 PROCEDURE — 76010000138 HC OR TIME 0.5 TO 1 HR: Performed by: ORTHOPAEDIC SURGERY

## 2021-01-01 PROCEDURE — 84443 ASSAY THYROID STIM HORMONE: CPT

## 2021-01-01 PROCEDURE — 77030040830 HC CATH URETH FOL MDII -A

## 2021-01-01 PROCEDURE — 74011000250 HC RX REV CODE- 250: Performed by: NURSE PRACTITIONER

## 2021-01-01 PROCEDURE — 74011000250 HC RX REV CODE- 250: Performed by: ANESTHESIOLOGY

## 2021-01-01 PROCEDURE — 76775 US EXAM ABDO BACK WALL LIM: CPT

## 2021-01-01 PROCEDURE — 93005 ELECTROCARDIOGRAM TRACING: CPT | Performed by: EMERGENCY MEDICINE

## 2021-01-01 PROCEDURE — 99284 EMERGENCY DEPT VISIT MOD MDM: CPT

## 2021-01-01 PROCEDURE — 74011250636 HC RX REV CODE- 250/636: Performed by: EMERGENCY MEDICINE

## 2021-01-01 PROCEDURE — 73562 X-RAY EXAM OF KNEE 3: CPT

## 2021-01-01 PROCEDURE — 82570 ASSAY OF URINE CREATININE: CPT

## 2021-01-01 PROCEDURE — 74011000258 HC RX REV CODE- 258: Performed by: INTERNAL MEDICINE

## 2021-01-01 PROCEDURE — 97166 OT EVAL MOD COMPLEX 45 MIN: CPT

## 2021-01-01 PROCEDURE — 99283 EMERGENCY DEPT VISIT LOW MDM: CPT

## 2021-01-01 PROCEDURE — 0R9L0ZX DRAINAGE OF RIGHT ELBOW JOINT, OPEN APPROACH, DIAGNOSTIC: ICD-10-PCS | Performed by: ORTHOPAEDIC SURGERY

## 2021-01-01 PROCEDURE — 94760 N-INVAS EAR/PLS OXIMETRY 1: CPT

## 2021-01-01 PROCEDURE — 80053 COMPREHEN METABOLIC PANEL: CPT

## 2021-01-01 PROCEDURE — 93971 EXTREMITY STUDY: CPT

## 2021-01-01 PROCEDURE — 2709999900 HC NON-CHARGEABLE SUPPLY: Performed by: ORTHOPAEDIC SURGERY

## 2021-01-01 PROCEDURE — 77030002916 HC SUT ETHLN J&J -A: Performed by: ORTHOPAEDIC SURGERY

## 2021-01-01 PROCEDURE — 97110 THERAPEUTIC EXERCISES: CPT

## 2021-01-01 PROCEDURE — 87205 SMEAR GRAM STAIN: CPT

## 2021-01-01 PROCEDURE — 82728 ASSAY OF FERRITIN: CPT

## 2021-01-01 PROCEDURE — 99223 1ST HOSP IP/OBS HIGH 75: CPT | Performed by: INTERNAL MEDICINE

## 2021-01-01 PROCEDURE — 86580 TB INTRADERMAL TEST: CPT | Performed by: INTERNAL MEDICINE

## 2021-01-01 PROCEDURE — 77030007880 HC KT SPN EPDRL BBMI -B: Performed by: ANESTHESIOLOGY

## 2021-01-01 PROCEDURE — 83605 ASSAY OF LACTIC ACID: CPT

## 2021-01-01 PROCEDURE — 77030013708 HC HNDPC SUC IRR PULS STRY –B: Performed by: ORTHOPAEDIC SURGERY

## 2021-01-01 PROCEDURE — 97112 NEUROMUSCULAR REEDUCATION: CPT

## 2021-01-01 PROCEDURE — 71045 X-RAY EXAM CHEST 1 VIEW: CPT

## 2021-01-01 PROCEDURE — 74011250636 HC RX REV CODE- 250/636: Performed by: ANESTHESIOLOGY

## 2021-01-01 PROCEDURE — 74011000250 HC RX REV CODE- 250: Performed by: NURSE ANESTHETIST, CERTIFIED REGISTERED

## 2021-01-01 RX ORDER — MAGNESIUM SULFATE HEPTAHYDRATE 40 MG/ML
2 INJECTION, SOLUTION INTRAVENOUS ONCE
Status: COMPLETED | OUTPATIENT
Start: 2021-01-01 | End: 2021-01-01

## 2021-01-01 RX ORDER — ACETAMINOPHEN 500 MG
1000 TABLET ORAL
Status: DISCONTINUED | OUTPATIENT
Start: 2021-01-01 | End: 2021-01-01 | Stop reason: HOSPADM

## 2021-01-01 RX ORDER — MIDODRINE HYDROCHLORIDE 2.5 MG/1
2.5 TABLET ORAL
Qty: 90 TAB | Refills: 0 | Status: SHIPPED
Start: 2021-01-01 | End: 2021-05-08

## 2021-01-01 RX ORDER — FAMOTIDINE 20 MG/1
20 TABLET, FILM COATED ORAL ONCE
Status: DISCONTINUED | OUTPATIENT
Start: 2021-01-01 | End: 2021-01-01 | Stop reason: HOSPADM

## 2021-01-01 RX ORDER — HYDROCORTISONE 20 MG/1
20 TABLET ORAL
Status: DISCONTINUED | OUTPATIENT
Start: 2021-01-01 | End: 2021-01-01 | Stop reason: HOSPADM

## 2021-01-01 RX ORDER — OXYCODONE HYDROCHLORIDE 5 MG/1
2.5 TABLET ORAL
Status: DISCONTINUED | OUTPATIENT
Start: 2021-01-01 | End: 2021-01-01 | Stop reason: HOSPADM

## 2021-01-01 RX ORDER — HYDROXYZINE HYDROCHLORIDE 10 MG/1
10 TABLET, FILM COATED ORAL ONCE
Status: COMPLETED | OUTPATIENT
Start: 2021-01-01 | End: 2021-01-01

## 2021-01-01 RX ORDER — ACETAMINOPHEN 325 MG/1
650 TABLET ORAL
Status: DISCONTINUED | OUTPATIENT
Start: 2021-01-01 | End: 2021-01-01

## 2021-01-01 RX ORDER — LIDOCAINE HYDROCHLORIDE 20 MG/ML
INJECTION, SOLUTION EPIDURAL; INFILTRATION; INTRACAUDAL; PERINEURAL AS NEEDED
Status: DISCONTINUED | OUTPATIENT
Start: 2021-01-01 | End: 2021-01-01 | Stop reason: HOSPADM

## 2021-01-01 RX ORDER — PROPOFOL 10 MG/ML
INJECTION, EMULSION INTRAVENOUS
Status: DISCONTINUED | OUTPATIENT
Start: 2021-01-01 | End: 2021-01-01 | Stop reason: HOSPADM

## 2021-01-01 RX ORDER — DEXTROSE MONOHYDRATE AND SODIUM CHLORIDE 5; .45 G/100ML; G/100ML
75 INJECTION, SOLUTION INTRAVENOUS CONTINUOUS
Status: DISCONTINUED | OUTPATIENT
Start: 2021-01-01 | End: 2021-01-01

## 2021-01-01 RX ORDER — LIDOCAINE HYDROCHLORIDE AND EPINEPHRINE 10; 10 MG/ML; UG/ML
INJECTION, SOLUTION INFILTRATION; PERINEURAL AS NEEDED
Status: DISCONTINUED | OUTPATIENT
Start: 2021-01-01 | End: 2021-01-01 | Stop reason: HOSPADM

## 2021-01-01 RX ORDER — SODIUM BICARBONATE 650 MG/1
650 TABLET ORAL 3 TIMES DAILY
Qty: 90 TAB | Refills: 0 | Status: SHIPPED
Start: 2021-01-01

## 2021-01-01 RX ORDER — ACETAMINOPHEN 650 MG/1
650 SUPPOSITORY RECTAL
Status: DISCONTINUED | OUTPATIENT
Start: 2021-01-01 | End: 2021-01-01

## 2021-01-01 RX ORDER — DIVALPROEX SODIUM 125 MG/1
125 CAPSULE, COATED PELLETS ORAL EVERY 12 HOURS
Status: DISCONTINUED | OUTPATIENT
Start: 2021-01-01 | End: 2021-01-01 | Stop reason: HOSPADM

## 2021-01-01 RX ORDER — METOPROLOL TARTRATE 5 MG/5ML
2.5 INJECTION INTRAVENOUS
Status: DISCONTINUED | OUTPATIENT
Start: 2021-01-01 | End: 2021-01-01 | Stop reason: HOSPADM

## 2021-01-01 RX ORDER — BUPIVACAINE HYDROCHLORIDE 7.5 MG/ML
INJECTION, SOLUTION INTRASPINAL
Status: DISCONTINUED | OUTPATIENT
Start: 2021-01-01 | End: 2021-01-01 | Stop reason: HOSPADM

## 2021-01-01 RX ORDER — GUAIFENESIN 100 MG/5ML
81 LIQUID (ML) ORAL DAILY
Status: DISCONTINUED | OUTPATIENT
Start: 2021-01-01 | End: 2021-01-01 | Stop reason: HOSPADM

## 2021-01-01 RX ORDER — HYDROMORPHONE HYDROCHLORIDE 1 MG/ML
0.5 INJECTION, SOLUTION INTRAMUSCULAR; INTRAVENOUS; SUBCUTANEOUS
Status: DISCONTINUED | OUTPATIENT
Start: 2021-01-01 | End: 2021-01-01 | Stop reason: HOSPADM

## 2021-01-01 RX ORDER — HYDROCORTISONE 5 MG/1
10 TABLET ORAL 3 TIMES DAILY
Status: DISCONTINUED | OUTPATIENT
Start: 2021-01-01 | End: 2021-01-01

## 2021-01-01 RX ORDER — SODIUM CHLORIDE 9 MG/ML
50 INJECTION, SOLUTION INTRAVENOUS CONTINUOUS
Status: DISCONTINUED | OUTPATIENT
Start: 2021-01-01 | End: 2021-01-01 | Stop reason: HOSPADM

## 2021-01-01 RX ORDER — POTASSIUM CHLORIDE 14.9 MG/ML
20 INJECTION INTRAVENOUS
Status: COMPLETED | OUTPATIENT
Start: 2021-01-01 | End: 2021-01-01

## 2021-01-01 RX ORDER — ACETAMINOPHEN 325 MG/1
650 TABLET ORAL EVERY 8 HOURS
Status: DISCONTINUED | OUTPATIENT
Start: 2021-01-01 | End: 2021-01-01 | Stop reason: HOSPADM

## 2021-01-01 RX ORDER — ALBUMIN HUMAN 250 G/1000ML
12.5 SOLUTION INTRAVENOUS EVERY 6 HOURS
Status: DISPENSED | OUTPATIENT
Start: 2021-01-01 | End: 2021-01-01

## 2021-01-01 RX ORDER — HEPARIN SODIUM 5000 [USP'U]/ML
5000 INJECTION, SOLUTION INTRAVENOUS; SUBCUTANEOUS EVERY 8 HOURS
Status: DISCONTINUED | OUTPATIENT
Start: 2021-01-01 | End: 2021-01-01

## 2021-01-01 RX ORDER — ONDANSETRON 2 MG/ML
4 INJECTION INTRAMUSCULAR; INTRAVENOUS
Status: DISCONTINUED | OUTPATIENT
Start: 2021-01-01 | End: 2021-01-01 | Stop reason: HOSPADM

## 2021-01-01 RX ORDER — HYDROCODONE BITARTRATE AND ACETAMINOPHEN 5; 325 MG/1; MG/1
1 TABLET ORAL AS NEEDED
Status: DISCONTINUED | OUTPATIENT
Start: 2021-01-01 | End: 2021-01-01 | Stop reason: HOSPADM

## 2021-01-01 RX ORDER — SODIUM CHLORIDE 9 MG/ML
125 INJECTION, SOLUTION INTRAVENOUS ONCE
Status: COMPLETED | OUTPATIENT
Start: 2021-01-01 | End: 2021-01-01

## 2021-01-01 RX ORDER — DORZOLAMIDE HYDROCHLORIDE AND TIMOLOL MALEATE 20; 5 MG/ML; MG/ML
1 SOLUTION/ DROPS OPHTHALMIC 2 TIMES DAILY
Status: DISCONTINUED | OUTPATIENT
Start: 2021-01-01 | End: 2021-01-01 | Stop reason: HOSPADM

## 2021-01-01 RX ORDER — VANCOMYCIN HYDROCHLORIDE 1 G/20ML
INJECTION, POWDER, LYOPHILIZED, FOR SOLUTION INTRAVENOUS EVERY 24 HOURS
Status: DISCONTINUED | OUTPATIENT
Start: 2021-01-01 | End: 2021-01-01 | Stop reason: DRUGHIGH

## 2021-01-01 RX ORDER — SODIUM CHLORIDE 0.9 % (FLUSH) 0.9 %
5-40 SYRINGE (ML) INJECTION AS NEEDED
Status: DISCONTINUED | OUTPATIENT
Start: 2021-01-01 | End: 2021-01-01 | Stop reason: HOSPADM

## 2021-01-01 RX ORDER — FLUDROCORTISONE ACETATE 0.1 MG/1
0.2 TABLET ORAL 2 TIMES DAILY
Status: DISCONTINUED | OUTPATIENT
Start: 2021-01-01 | End: 2021-01-01 | Stop reason: HOSPADM

## 2021-01-01 RX ORDER — HYDROCORTISONE SODIUM SUCCINATE 100 MG/2ML
50 INJECTION, POWDER, FOR SOLUTION INTRAMUSCULAR; INTRAVENOUS ONCE
Status: COMPLETED | OUTPATIENT
Start: 2021-01-01 | End: 2021-01-01

## 2021-01-01 RX ORDER — POLYETHYLENE GLYCOL 3350 17 G/17G
17 POWDER, FOR SOLUTION ORAL DAILY PRN
Status: DISCONTINUED | OUTPATIENT
Start: 2021-01-01 | End: 2021-01-01 | Stop reason: HOSPADM

## 2021-01-01 RX ORDER — ACETAMINOPHEN 500 MG
1000 TABLET ORAL ONCE
Status: DISCONTINUED | OUTPATIENT
Start: 2021-01-01 | End: 2021-01-01 | Stop reason: HOSPADM

## 2021-01-01 RX ORDER — SODIUM BICARBONATE 650 MG/1
650 TABLET ORAL DAILY
Status: DISCONTINUED | OUTPATIENT
Start: 2021-01-01 | End: 2021-01-01

## 2021-01-01 RX ORDER — LANOLIN ALCOHOL/MO/W.PET/CERES
400 CREAM (GRAM) TOPICAL 2 TIMES DAILY
Status: DISCONTINUED | OUTPATIENT
Start: 2021-01-01 | End: 2021-01-01 | Stop reason: HOSPADM

## 2021-01-01 RX ORDER — SODIUM CHLORIDE, SODIUM LACTATE, POTASSIUM CHLORIDE, CALCIUM CHLORIDE 600; 310; 30; 20 MG/100ML; MG/100ML; MG/100ML; MG/100ML
150 INJECTION, SOLUTION INTRAVENOUS CONTINUOUS
Status: DISCONTINUED | OUTPATIENT
Start: 2021-01-01 | End: 2021-01-01 | Stop reason: HOSPADM

## 2021-01-01 RX ORDER — VANCOMYCIN 1.75 GRAM/500 ML IN 0.9 % SODIUM CHLORIDE INTRAVENOUS
1750 ONCE
Status: DISCONTINUED | OUTPATIENT
Start: 2021-01-01 | End: 2021-01-01

## 2021-01-01 RX ORDER — CEFAZOLIN SODIUM/WATER 2 G/20 ML
2 SYRINGE (ML) INTRAVENOUS EVERY 12 HOURS
Status: DISCONTINUED | OUTPATIENT
Start: 2021-01-01 | End: 2021-01-01 | Stop reason: HOSPADM

## 2021-01-01 RX ORDER — SODIUM CHLORIDE 0.9 % (FLUSH) 0.9 %
5-40 SYRINGE (ML) INJECTION EVERY 8 HOURS
Status: DISCONTINUED | OUTPATIENT
Start: 2021-01-01 | End: 2021-01-01 | Stop reason: HOSPADM

## 2021-01-01 RX ORDER — HEPARIN SODIUM 5000 [USP'U]/ML
5000 INJECTION, SOLUTION INTRAVENOUS; SUBCUTANEOUS EVERY 8 HOURS
Status: DISCONTINUED | OUTPATIENT
Start: 2021-01-01 | End: 2021-01-01 | Stop reason: HOSPADM

## 2021-01-01 RX ORDER — HYDROCORTISONE 5 MG/1
10 TABLET ORAL 2 TIMES DAILY WITH MEALS
Status: DISCONTINUED | OUTPATIENT
Start: 2021-01-01 | End: 2021-01-01 | Stop reason: HOSPADM

## 2021-01-01 RX ORDER — ATORVASTATIN CALCIUM 40 MG/1
40 TABLET, FILM COATED ORAL
Status: DISCONTINUED | OUTPATIENT
Start: 2021-01-01 | End: 2021-01-01 | Stop reason: HOSPADM

## 2021-01-01 RX ORDER — DOXYCYCLINE 100 MG/1
100 CAPSULE ORAL EVERY 12 HOURS
Status: DISCONTINUED | OUTPATIENT
Start: 2021-01-01 | End: 2021-01-01

## 2021-01-01 RX ORDER — FACIAL-BODY WIPES
10 EACH TOPICAL DAILY PRN
Status: DISCONTINUED | OUTPATIENT
Start: 2021-01-01 | End: 2021-01-01 | Stop reason: HOSPADM

## 2021-01-01 RX ORDER — FAMOTIDINE 20 MG/1
20 TABLET, FILM COATED ORAL
Status: DISCONTINUED | OUTPATIENT
Start: 2021-01-01 | End: 2021-01-01 | Stop reason: HOSPADM

## 2021-01-01 RX ORDER — MIDODRINE HYDROCHLORIDE 5 MG/1
2.5 TABLET ORAL
Status: DISCONTINUED | OUTPATIENT
Start: 2021-01-01 | End: 2021-01-01 | Stop reason: HOSPADM

## 2021-01-01 RX ORDER — PANTOPRAZOLE SODIUM 40 MG/1
40 TABLET, DELAYED RELEASE ORAL DAILY
Status: DISCONTINUED | OUTPATIENT
Start: 2021-01-01 | End: 2021-01-01 | Stop reason: HOSPADM

## 2021-01-01 RX ORDER — FUROSEMIDE 10 MG/ML
20 INJECTION INTRAMUSCULAR; INTRAVENOUS ONCE
Status: COMPLETED | OUTPATIENT
Start: 2021-01-01 | End: 2021-01-01

## 2021-01-01 RX ORDER — FENTANYL CITRATE 50 UG/ML
100 INJECTION, SOLUTION INTRAMUSCULAR; INTRAVENOUS ONCE
Status: DISCONTINUED | OUTPATIENT
Start: 2021-01-01 | End: 2021-01-01 | Stop reason: HOSPADM

## 2021-01-01 RX ORDER — LIDOCAINE HYDROCHLORIDE 10 MG/ML
0.1 INJECTION INFILTRATION; PERINEURAL AS NEEDED
Status: DISCONTINUED | OUTPATIENT
Start: 2021-01-01 | End: 2021-01-01 | Stop reason: HOSPADM

## 2021-01-01 RX ORDER — PROMETHAZINE HYDROCHLORIDE 25 MG/1
25 TABLET ORAL
Status: DISCONTINUED | OUTPATIENT
Start: 2021-01-01 | End: 2021-01-01 | Stop reason: HOSPADM

## 2021-01-01 RX ORDER — METOPROLOL TARTRATE 25 MG/1
12.5 TABLET, FILM COATED ORAL 2 TIMES DAILY
Status: DISCONTINUED | OUTPATIENT
Start: 2021-01-01 | End: 2021-01-01

## 2021-01-01 RX ORDER — BUPROPION HYDROCHLORIDE 100 MG/1
100 TABLET ORAL DAILY
Status: DISCONTINUED | OUTPATIENT
Start: 2021-01-01 | End: 2021-01-01 | Stop reason: HOSPADM

## 2021-01-01 RX ORDER — EPHEDRINE SULFATE/0.9% NACL/PF 50 MG/5 ML
SYRINGE (ML) INTRAVENOUS AS NEEDED
Status: DISCONTINUED | OUTPATIENT
Start: 2021-01-01 | End: 2021-01-01 | Stop reason: HOSPADM

## 2021-01-01 RX ORDER — PROPOFOL 10 MG/ML
INJECTION, EMULSION INTRAVENOUS AS NEEDED
Status: DISCONTINUED | OUTPATIENT
Start: 2021-01-01 | End: 2021-01-01 | Stop reason: HOSPADM

## 2021-01-01 RX ORDER — POTASSIUM CHLORIDE 20 MEQ/1
20 TABLET, EXTENDED RELEASE ORAL
Status: COMPLETED | OUTPATIENT
Start: 2021-01-01 | End: 2021-01-01

## 2021-01-01 RX ORDER — SODIUM BICARBONATE 650 MG/1
650 TABLET ORAL 3 TIMES DAILY
Status: DISCONTINUED | OUTPATIENT
Start: 2021-01-01 | End: 2021-01-01 | Stop reason: HOSPADM

## 2021-01-01 RX ORDER — DIVALPROEX SODIUM 125 MG/1
125 CAPSULE, COATED PELLETS ORAL EVERY 12 HOURS
Qty: 60 CAP | Refills: 0 | Status: SHIPPED
Start: 2021-01-01

## 2021-01-01 RX ORDER — POTASSIUM CHLORIDE 14.9 MG/ML
20 INJECTION INTRAVENOUS
Status: DISPENSED | OUTPATIENT
Start: 2021-01-01 | End: 2021-01-01

## 2021-01-01 RX ORDER — CEFADROXIL 1000 MG/1
500 TABLET ORAL 2 TIMES DAILY
Qty: 15 TAB | Refills: 0 | Status: SHIPPED
Start: 2021-01-01 | End: 2021-01-01

## 2021-01-01 RX ADMIN — PROPOFOL 25 MCG/KG/MIN: 10 INJECTION, EMULSION INTRAVENOUS at 10:19

## 2021-01-01 RX ADMIN — HEPARIN SODIUM 5000 UNITS: 5000 INJECTION INTRAVENOUS; SUBCUTANEOUS at 09:39

## 2021-01-01 RX ADMIN — DORZOLAMIDE HYDROCHLORIDE AND TIMOLOL MALEATE 1 DROP: 20; 5 SOLUTION/ DROPS OPHTHALMIC at 14:10

## 2021-01-01 RX ADMIN — TETANUS TOXOID, REDUCED DIPHTHERIA TOXOID AND ACELLULAR PERTUSSIS VACCINE, ADSORBED 0.5 ML: 5; 2.5; 8; 8; 2.5 SUSPENSION INTRAMUSCULAR at 18:35

## 2021-01-01 RX ADMIN — OXYCODONE 2.5 MG: 5 TABLET ORAL at 17:35

## 2021-01-01 RX ADMIN — PANTOPRAZOLE SODIUM 40 MG: 40 TABLET, DELAYED RELEASE ORAL at 07:16

## 2021-01-01 RX ADMIN — Medication 10 ML: at 22:32

## 2021-01-01 RX ADMIN — SODIUM BICARBONATE 650 MG TABLET 650 MG: at 22:25

## 2021-01-01 RX ADMIN — HEPARIN SODIUM 5000 UNITS: 5000 INJECTION INTRAVENOUS; SUBCUTANEOUS at 09:35

## 2021-01-01 RX ADMIN — FLUDROCORTISONE ACETATE 0.2 MG: 0.1 TABLET ORAL at 23:33

## 2021-01-01 RX ADMIN — ASPIRIN 81 MG: 81 TABLET, CHEWABLE ORAL at 09:34

## 2021-01-01 RX ADMIN — MIDODRINE HYDROCHLORIDE 2.5 MG: 5 TABLET ORAL at 17:50

## 2021-01-01 RX ADMIN — DORZOLAMIDE HYDROCHLORIDE AND TIMOLOL MALEATE 1 DROP: 20; 5 SOLUTION/ DROPS OPHTHALMIC at 17:09

## 2021-01-01 RX ADMIN — Medication 10 ML: at 14:33

## 2021-01-01 RX ADMIN — MAGNESIUM SULFATE HEPTAHYDRATE 2 G: 40 INJECTION, SOLUTION INTRAVENOUS at 16:27

## 2021-01-01 RX ADMIN — METOPROLOL TARTRATE 12.5 MG: 25 TABLET, FILM COATED ORAL at 09:00

## 2021-01-01 RX ADMIN — DEXTROSE MONOHYDRATE AND SODIUM CHLORIDE 75 ML/HR: 5; .45 INJECTION, SOLUTION INTRAVENOUS at 20:41

## 2021-01-01 RX ADMIN — BUPIVACAINE HYDROCHLORIDE IN DEXTROSE 9 MG: 7.5 INJECTION, SOLUTION SUBARACHNOID at 10:24

## 2021-01-01 RX ADMIN — HYDROCORTISONE 10 MG: 5 TABLET ORAL at 18:55

## 2021-01-01 RX ADMIN — HEPARIN SODIUM 5000 UNITS: 5000 INJECTION INTRAVENOUS; SUBCUTANEOUS at 16:47

## 2021-01-01 RX ADMIN — Medication 10 ML: at 06:02

## 2021-01-01 RX ADMIN — METOPROLOL TARTRATE 12.5 MG: 25 TABLET, FILM COATED ORAL at 17:32

## 2021-01-01 RX ADMIN — HEPARIN SODIUM 5000 UNITS: 5000 INJECTION INTRAVENOUS; SUBCUTANEOUS at 09:40

## 2021-01-01 RX ADMIN — BUPROPION HYDROCHLORIDE 100 MG: 100 TABLET, FILM COATED ORAL at 13:02

## 2021-01-01 RX ADMIN — METOPROLOL TARTRATE: 25 TABLET, FILM COATED ORAL at 09:46

## 2021-01-01 RX ADMIN — ACETAMINOPHEN 650 MG: 325 TABLET, FILM COATED ORAL at 22:19

## 2021-01-01 RX ADMIN — MIDODRINE HYDROCHLORIDE 2.5 MG: 5 TABLET ORAL at 18:00

## 2021-01-01 RX ADMIN — ACETAMINOPHEN 650 MG: 325 TABLET, FILM COATED ORAL at 16:24

## 2021-01-01 RX ADMIN — HYDROCORTISONE 5 MG: 5 TABLET ORAL at 13:24

## 2021-01-01 RX ADMIN — ASPIRIN 81 MG: 81 TABLET, CHEWABLE ORAL at 09:00

## 2021-01-01 RX ADMIN — FLUDROCORTISONE ACETATE 0.2 MG: 0.1 TABLET ORAL at 08:51

## 2021-01-01 RX ADMIN — SODIUM BICARBONATE 650 MG TABLET 650 MG: at 22:19

## 2021-01-01 RX ADMIN — ACETAMINOPHEN 650 MG: 325 TABLET, FILM COATED ORAL at 13:24

## 2021-01-01 RX ADMIN — CEFAZOLIN SODIUM 2 G: 100 INJECTION, POWDER, LYOPHILIZED, FOR SOLUTION INTRAVENOUS at 14:34

## 2021-01-01 RX ADMIN — Medication 10 ML: at 13:05

## 2021-01-01 RX ADMIN — PANTOPRAZOLE SODIUM 40 MG: 40 TABLET, DELAYED RELEASE ORAL at 08:51

## 2021-01-01 RX ADMIN — DEXTROSE MONOHYDRATE AND SODIUM CHLORIDE 75 ML/HR: 5; .45 INJECTION, SOLUTION INTRAVENOUS at 19:34

## 2021-01-01 RX ADMIN — DEXTROSE MONOHYDRATE AND SODIUM CHLORIDE 75 ML/HR: 5; .45 INJECTION, SOLUTION INTRAVENOUS at 00:13

## 2021-01-01 RX ADMIN — CEFAZOLIN SODIUM 2 G: 100 INJECTION, POWDER, LYOPHILIZED, FOR SOLUTION INTRAVENOUS at 16:47

## 2021-01-01 RX ADMIN — ALBUMIN (HUMAN) 12.5 G: 0.25 INJECTION, SOLUTION INTRAVENOUS at 23:00

## 2021-01-01 RX ADMIN — Medication 10 ML: at 05:39

## 2021-01-01 RX ADMIN — ACETAMINOPHEN 650 MG: 325 TABLET, FILM COATED ORAL at 22:24

## 2021-01-01 RX ADMIN — METOPROLOL TARTRATE 12.5 MG: 25 TABLET, FILM COATED ORAL at 08:53

## 2021-01-01 RX ADMIN — DIVALPROEX SODIUM 125 MG: 125 CAPSULE ORAL at 22:15

## 2021-01-01 RX ADMIN — DORZOLAMIDE HYDROCHLORIDE AND TIMOLOL MALEATE 1 DROP: 20; 5 SOLUTION/ DROPS OPHTHALMIC at 09:41

## 2021-01-01 RX ADMIN — POTASSIUM CHLORIDE 20 MEQ: 14.9 INJECTION, SOLUTION INTRAVENOUS at 12:56

## 2021-01-01 RX ADMIN — MIDODRINE HYDROCHLORIDE 2.5 MG: 5 TABLET ORAL at 09:34

## 2021-01-01 RX ADMIN — SODIUM BICARBONATE 650 MG TABLET 650 MG: at 08:48

## 2021-01-01 RX ADMIN — PHENYLEPHRINE HYDROCHLORIDE 180 MCG: 10 INJECTION INTRAVENOUS at 10:44

## 2021-01-01 RX ADMIN — FLUDROCORTISONE ACETATE 0.2 MG: 0.1 TABLET ORAL at 17:32

## 2021-01-01 RX ADMIN — PANTOPRAZOLE SODIUM 40 MG: 40 TABLET, DELAYED RELEASE ORAL at 09:41

## 2021-01-01 RX ADMIN — HYDROCORTISONE 10 MG: 5 TABLET ORAL at 12:15

## 2021-01-01 RX ADMIN — HYDROCORTISONE 10 MG: 5 TABLET ORAL at 11:48

## 2021-01-01 RX ADMIN — POTASSIUM BICARBONATE 40 MEQ: 391 TABLET, EFFERVESCENT ORAL at 08:51

## 2021-01-01 RX ADMIN — HYDROXYZINE HYDROCHLORIDE 10 MG: 10 TABLET, FILM COATED ORAL at 00:37

## 2021-01-01 RX ADMIN — DORZOLAMIDE HYDROCHLORIDE AND TIMOLOL MALEATE 1 DROP: 20; 5 SOLUTION/ DROPS OPHTHALMIC at 09:00

## 2021-01-01 RX ADMIN — Medication 10 ML: at 21:49

## 2021-01-01 RX ADMIN — METOPROLOL TARTRATE 12.5 MG: 25 TABLET, FILM COATED ORAL at 18:57

## 2021-01-01 RX ADMIN — DOXYCYCLINE 100 MG: 100 INJECTION, POWDER, LYOPHILIZED, FOR SOLUTION INTRAVENOUS at 21:59

## 2021-01-01 RX ADMIN — HYDROCORTISONE 20 MG: 20 TABLET ORAL at 09:40

## 2021-01-01 RX ADMIN — HYDROCORTISONE 10 MG: 5 TABLET ORAL at 17:31

## 2021-01-01 RX ADMIN — SODIUM BICARBONATE 650 MG TABLET 650 MG: at 11:35

## 2021-01-01 RX ADMIN — HEPARIN SODIUM 5000 UNITS: 5000 INJECTION INTRAVENOUS; SUBCUTANEOUS at 08:46

## 2021-01-01 RX ADMIN — ACETAMINOPHEN 650 MG: 325 TABLET, FILM COATED ORAL at 06:03

## 2021-01-01 RX ADMIN — POTASSIUM CHLORIDE 20 MEQ: 14.9 INJECTION, SOLUTION INTRAVENOUS at 17:31

## 2021-01-01 RX ADMIN — FLUDROCORTISONE ACETATE 0.2 MG: 0.1 TABLET ORAL at 09:10

## 2021-01-01 RX ADMIN — CEFAZOLIN SODIUM 2 G: 100 INJECTION, POWDER, LYOPHILIZED, FOR SOLUTION INTRAVENOUS at 14:55

## 2021-01-01 RX ADMIN — SODIUM BICARBONATE 650 MG TABLET 650 MG: at 16:24

## 2021-01-01 RX ADMIN — DORZOLAMIDE HYDROCHLORIDE AND TIMOLOL MALEATE 1 DROP: 20; 5 SOLUTION/ DROPS OPHTHALMIC at 08:45

## 2021-01-01 RX ADMIN — DORZOLAMIDE HYDROCHLORIDE AND TIMOLOL MALEATE 1 DROP: 20; 5 SOLUTION/ DROPS OPHTHALMIC at 17:03

## 2021-01-01 RX ADMIN — Medication 10 ML: at 22:21

## 2021-01-01 RX ADMIN — FLUDROCORTISONE ACETATE 0.2 MG: 0.1 TABLET ORAL at 09:40

## 2021-01-01 RX ADMIN — Medication 10 ML: at 15:03

## 2021-01-01 RX ADMIN — DEXTROSE MONOHYDRATE AND SODIUM CHLORIDE 75 ML/HR: 5; .45 INJECTION, SOLUTION INTRAVENOUS at 09:54

## 2021-01-01 RX ADMIN — FLUDROCORTISONE ACETATE 0.2 MG: 0.1 TABLET ORAL at 17:31

## 2021-01-01 RX ADMIN — HEPARIN SODIUM 5000 UNITS: 5000 INJECTION INTRAVENOUS; SUBCUTANEOUS at 01:08

## 2021-01-01 RX ADMIN — CEFTRIAXONE SODIUM 1 G: 1 INJECTION, POWDER, FOR SOLUTION INTRAMUSCULAR; INTRAVENOUS at 13:24

## 2021-01-01 RX ADMIN — FUROSEMIDE 20 MG: 10 INJECTION, SOLUTION INTRAMUSCULAR; INTRAVENOUS at 23:33

## 2021-01-01 RX ADMIN — HYDROCORTISONE 20 MG: 20 TABLET ORAL at 13:02

## 2021-01-01 RX ADMIN — ATORVASTATIN CALCIUM 40 MG: 40 TABLET, FILM COATED ORAL at 21:47

## 2021-01-01 RX ADMIN — SODIUM BICARBONATE 650 MG TABLET 650 MG: at 22:16

## 2021-01-01 RX ADMIN — SODIUM BICARBONATE 650 MG TABLET 650 MG: at 17:32

## 2021-01-01 RX ADMIN — PANTOPRAZOLE SODIUM 40 MG: 40 TABLET, DELAYED RELEASE ORAL at 09:10

## 2021-01-01 RX ADMIN — FLUDROCORTISONE ACETATE 0.2 MG: 0.1 TABLET ORAL at 17:50

## 2021-01-01 RX ADMIN — Medication 10 ML: at 22:20

## 2021-01-01 RX ADMIN — BUPROPION HYDROCHLORIDE 100 MG: 100 TABLET, FILM COATED ORAL at 09:41

## 2021-01-01 RX ADMIN — PANTOPRAZOLE SODIUM 40 MG: 40 TABLET, DELAYED RELEASE ORAL at 09:40

## 2021-01-01 RX ADMIN — ACETAMINOPHEN 650 MG: 325 TABLET, FILM COATED ORAL at 14:13

## 2021-01-01 RX ADMIN — SODIUM BICARBONATE 650 MG TABLET 650 MG: at 18:00

## 2021-01-01 RX ADMIN — POTASSIUM CHLORIDE 20 MEQ: 14.9 INJECTION, SOLUTION INTRAVENOUS at 20:36

## 2021-01-01 RX ADMIN — DIVALPROEX SODIUM 125 MG: 125 CAPSULE ORAL at 09:34

## 2021-01-01 RX ADMIN — HYDROCORTISONE 20 MG: 20 TABLET ORAL at 09:36

## 2021-01-01 RX ADMIN — SODIUM BICARBONATE 650 MG TABLET 650 MG: at 13:02

## 2021-01-01 RX ADMIN — Medication 10 ML: at 05:31

## 2021-01-01 RX ADMIN — BUPROPION HYDROCHLORIDE 100 MG: 100 TABLET, FILM COATED ORAL at 09:10

## 2021-01-01 RX ADMIN — ACETAMINOPHEN 650 MG: 325 TABLET, FILM COATED ORAL at 14:32

## 2021-01-01 RX ADMIN — Medication 10 MG: at 10:44

## 2021-01-01 RX ADMIN — METOPROLOL TARTRATE 12.5 MG: 25 TABLET, FILM COATED ORAL at 17:08

## 2021-01-01 RX ADMIN — SODIUM BICARBONATE 650 MG TABLET 650 MG: at 21:59

## 2021-01-01 RX ADMIN — DORZOLAMIDE HYDROCHLORIDE AND TIMOLOL MALEATE 1 DROP: 20; 5 SOLUTION/ DROPS OPHTHALMIC at 17:33

## 2021-01-01 RX ADMIN — TUBERCULIN PURIFIED PROTEIN DERIVATIVE 5 UNITS: 5 INJECTION, SOLUTION INTRADERMAL at 13:03

## 2021-01-01 RX ADMIN — FLUDROCORTISONE ACETATE 0.2 MG: 0.1 TABLET ORAL at 13:02

## 2021-01-01 RX ADMIN — HEPARIN SODIUM 5000 UNITS: 5000 INJECTION INTRAVENOUS; SUBCUTANEOUS at 00:00

## 2021-01-01 RX ADMIN — BUPROPION HYDROCHLORIDE 100 MG: 100 TABLET, FILM COATED ORAL at 09:46

## 2021-01-01 RX ADMIN — HYDROCORTISONE 10 MG: 5 TABLET ORAL at 11:55

## 2021-01-01 RX ADMIN — ACETAMINOPHEN 650 MG: 325 TABLET, FILM COATED ORAL at 05:29

## 2021-01-01 RX ADMIN — MIDODRINE HYDROCHLORIDE 2.5 MG: 5 TABLET ORAL at 11:55

## 2021-01-01 RX ADMIN — SODIUM BICARBONATE 650 MG TABLET 650 MG: at 21:46

## 2021-01-01 RX ADMIN — FLUDROCORTISONE ACETATE 0.2 MG: 0.1 TABLET ORAL at 09:46

## 2021-01-01 RX ADMIN — Medication 10 ML: at 14:49

## 2021-01-01 RX ADMIN — HEPARIN SODIUM 5000 UNITS: 5000 INJECTION INTRAVENOUS; SUBCUTANEOUS at 09:11

## 2021-01-01 RX ADMIN — HYDROCORTISONE 10 MG: 5 TABLET ORAL at 18:00

## 2021-01-01 RX ADMIN — HYDROCORTISONE 20 MG: 20 TABLET ORAL at 08:46

## 2021-01-01 RX ADMIN — DORZOLAMIDE HYDROCHLORIDE AND TIMOLOL MALEATE 1 DROP: 20; 5 SOLUTION/ DROPS OPHTHALMIC at 18:00

## 2021-01-01 RX ADMIN — PROPOFOL 20 MG: 10 INJECTION, EMULSION INTRAVENOUS at 10:18

## 2021-01-01 RX ADMIN — HYDROCORTISONE 10 MG: 5 TABLET ORAL at 13:01

## 2021-01-01 RX ADMIN — ACETAMINOPHEN 650 MG: 325 TABLET, FILM COATED ORAL at 21:47

## 2021-01-01 RX ADMIN — ASPIRIN 81 MG: 81 TABLET, CHEWABLE ORAL at 08:44

## 2021-01-01 RX ADMIN — CEFAZOLIN SODIUM 2 G: 100 INJECTION, POWDER, LYOPHILIZED, FOR SOLUTION INTRAVENOUS at 01:09

## 2021-01-01 RX ADMIN — HYDROCORTISONE 20 MG: 20 TABLET ORAL at 05:57

## 2021-01-01 RX ADMIN — SODIUM BICARBONATE 650 MG TABLET 650 MG: at 09:41

## 2021-01-01 RX ADMIN — HEPARIN SODIUM 5000 UNITS: 5000 INJECTION INTRAVENOUS; SUBCUTANEOUS at 18:01

## 2021-01-01 RX ADMIN — MAGNESIUM GLUCONATE 500 MG ORAL TABLET 400 MG: 500 TABLET ORAL at 09:40

## 2021-01-01 RX ADMIN — Medication 10 ML: at 13:34

## 2021-01-01 RX ADMIN — ACETAMINOPHEN 650 MG: 325 TABLET, FILM COATED ORAL at 05:56

## 2021-01-01 RX ADMIN — CEFAZOLIN SODIUM 2 G: 100 INJECTION, POWDER, LYOPHILIZED, FOR SOLUTION INTRAVENOUS at 17:40

## 2021-01-01 RX ADMIN — Medication 10 ML: at 05:29

## 2021-01-01 RX ADMIN — PHENYLEPHRINE HYDROCHLORIDE 180 MCG: 10 INJECTION INTRAVENOUS at 10:30

## 2021-01-01 RX ADMIN — HYDROCORTISONE 10 MG: 5 TABLET ORAL at 17:50

## 2021-01-01 RX ADMIN — ATORVASTATIN CALCIUM 40 MG: 40 TABLET, FILM COATED ORAL at 21:40

## 2021-01-01 RX ADMIN — HYDROCORTISONE 10 MG: 5 TABLET ORAL at 08:51

## 2021-01-01 RX ADMIN — SODIUM BICARBONATE 650 MG TABLET 650 MG: at 17:50

## 2021-01-01 RX ADMIN — HEPARIN SODIUM 5000 UNITS: 5000 INJECTION INTRAVENOUS; SUBCUTANEOUS at 00:31

## 2021-01-01 RX ADMIN — DORZOLAMIDE HYDROCHLORIDE AND TIMOLOL MALEATE 1 DROP: 20; 5 SOLUTION/ DROPS OPHTHALMIC at 21:39

## 2021-01-01 RX ADMIN — Medication 10 ML: at 16:57

## 2021-01-01 RX ADMIN — PANTOPRAZOLE SODIUM 40 MG: 40 TABLET, DELAYED RELEASE ORAL at 09:34

## 2021-01-01 RX ADMIN — METOPROLOL TARTRATE 12.5 MG: 25 TABLET, FILM COATED ORAL at 09:40

## 2021-01-01 RX ADMIN — POTASSIUM CHLORIDE 20 MEQ: 14.9 INJECTION, SOLUTION INTRAVENOUS at 17:44

## 2021-01-01 RX ADMIN — DOXYCYCLINE 100 MG: 100 INJECTION, POWDER, LYOPHILIZED, FOR SOLUTION INTRAVENOUS at 13:22

## 2021-01-01 RX ADMIN — Medication 10 ML: at 13:08

## 2021-01-01 RX ADMIN — POTASSIUM CHLORIDE 20 MEQ: 1500 TABLET, EXTENDED RELEASE ORAL at 23:33

## 2021-01-01 RX ADMIN — SODIUM CHLORIDE 500 MG: 9 INJECTION, SOLUTION INTRAMUSCULAR; INTRAVENOUS; SUBCUTANEOUS at 17:31

## 2021-01-01 RX ADMIN — SODIUM BICARBONATE 650 MG TABLET 650 MG: at 09:40

## 2021-01-01 RX ADMIN — HYDROCORTISONE 10 MG: 5 TABLET ORAL at 17:32

## 2021-01-01 RX ADMIN — DEXTROSE MONOHYDRATE AND SODIUM CHLORIDE 75 ML/HR: 5; .45 INJECTION, SOLUTION INTRAVENOUS at 06:09

## 2021-01-01 RX ADMIN — CEFAZOLIN SODIUM 2 G: 100 INJECTION, POWDER, LYOPHILIZED, FOR SOLUTION INTRAVENOUS at 01:33

## 2021-01-01 RX ADMIN — OXYCODONE 2.5 MG: 5 TABLET ORAL at 21:40

## 2021-01-01 RX ADMIN — ASPIRIN 81 MG: 81 TABLET, CHEWABLE ORAL at 09:39

## 2021-01-01 RX ADMIN — HYDROCORTISONE 20 MG: 20 TABLET ORAL at 10:02

## 2021-01-01 RX ADMIN — MIDODRINE HYDROCHLORIDE 2.5 MG: 5 TABLET ORAL at 09:40

## 2021-01-01 RX ADMIN — ACETAMINOPHEN 650 MG: 325 TABLET, FILM COATED ORAL at 14:49

## 2021-01-01 RX ADMIN — BUPROPION HYDROCHLORIDE 100 MG: 100 TABLET, FILM COATED ORAL at 08:51

## 2021-01-01 RX ADMIN — POTASSIUM CHLORIDE 20 MEQ: 14.9 INJECTION, SOLUTION INTRAVENOUS at 09:46

## 2021-01-01 RX ADMIN — FLUDROCORTISONE ACETATE 0.2 MG: 0.1 TABLET ORAL at 09:41

## 2021-01-01 RX ADMIN — PANTOPRAZOLE SODIUM 40 MG: 40 TABLET, DELAYED RELEASE ORAL at 09:46

## 2021-01-01 RX ADMIN — POTASSIUM CHLORIDE 20 MEQ: 14.9 INJECTION, SOLUTION INTRAVENOUS at 15:02

## 2021-01-01 RX ADMIN — ATORVASTATIN CALCIUM 40 MG: 40 TABLET, FILM COATED ORAL at 22:15

## 2021-01-01 RX ADMIN — ACETAMINOPHEN 650 MG: 325 TABLET, FILM COATED ORAL at 13:02

## 2021-01-01 RX ADMIN — SODIUM CHLORIDE 125 ML/HR: 900 INJECTION, SOLUTION INTRAVENOUS at 23:02

## 2021-01-01 RX ADMIN — FLUDROCORTISONE ACETATE 0.2 MG: 0.1 TABLET ORAL at 18:55

## 2021-01-01 RX ADMIN — HYDROCORTISONE 10 MG: 5 TABLET ORAL at 17:08

## 2021-01-01 RX ADMIN — FLUDROCORTISONE ACETATE 0.2 MG: 0.1 TABLET ORAL at 09:34

## 2021-01-01 RX ADMIN — HEPARIN SODIUM 5000 UNITS: 5000 INJECTION INTRAVENOUS; SUBCUTANEOUS at 16:58

## 2021-01-01 RX ADMIN — ALBUMIN (HUMAN) 12.5 G: 0.25 INJECTION, SOLUTION INTRAVENOUS at 05:13

## 2021-01-01 RX ADMIN — HYDROCORTISONE SODIUM SUCCINATE 50 MG: 100 INJECTION, POWDER, FOR SOLUTION INTRAMUSCULAR; INTRAVENOUS at 14:06

## 2021-01-01 RX ADMIN — BUPROPION HYDROCHLORIDE 100 MG: 100 TABLET, FILM COATED ORAL at 08:45

## 2021-01-01 RX ADMIN — DIVALPROEX SODIUM 125 MG: 125 CAPSULE ORAL at 18:00

## 2021-01-01 RX ADMIN — ACETAMINOPHEN 650 MG: 325 TABLET ORAL at 23:33

## 2021-01-01 RX ADMIN — DIVALPROEX SODIUM 125 MG: 125 CAPSULE ORAL at 09:40

## 2021-01-01 RX ADMIN — HEPARIN SODIUM 5000 UNITS: 5000 INJECTION INTRAVENOUS; SUBCUTANEOUS at 17:50

## 2021-01-01 RX ADMIN — Medication 10 ML: at 14:15

## 2021-01-01 RX ADMIN — Medication 10 MG: at 10:30

## 2021-01-01 RX ADMIN — ACETAMINOPHEN 650 MG: 325 TABLET, FILM COATED ORAL at 21:38

## 2021-01-01 RX ADMIN — POTASSIUM BICARBONATE 40 MEQ: 391 TABLET, EFFERVESCENT ORAL at 09:09

## 2021-01-01 RX ADMIN — SODIUM BICARBONATE 650 MG TABLET 650 MG: at 09:46

## 2021-01-01 RX ADMIN — Medication 10 ML: at 05:56

## 2021-01-01 RX ADMIN — Medication 10 ML: at 22:16

## 2021-01-01 RX ADMIN — CEFAZOLIN SODIUM 2 G: 100 INJECTION, POWDER, LYOPHILIZED, FOR SOLUTION INTRAVENOUS at 01:08

## 2021-01-01 RX ADMIN — HEPARIN SODIUM 5000 UNITS: 5000 INJECTION INTRAVENOUS; SUBCUTANEOUS at 17:09

## 2021-01-01 RX ADMIN — POTASSIUM BICARBONATE 40 MEQ: 391 TABLET, EFFERVESCENT ORAL at 15:02

## 2021-01-01 RX ADMIN — BUPROPION HYDROCHLORIDE 100 MG: 100 TABLET, FILM COATED ORAL at 09:39

## 2021-01-01 RX ADMIN — PANTOPRAZOLE SODIUM 40 MG: 40 TABLET, DELAYED RELEASE ORAL at 08:47

## 2021-01-01 RX ADMIN — ATORVASTATIN CALCIUM 40 MG: 40 TABLET, FILM COATED ORAL at 22:19

## 2021-01-01 RX ADMIN — ATORVASTATIN CALCIUM 40 MG: 40 TABLET, FILM COATED ORAL at 23:33

## 2021-01-01 RX ADMIN — POTASSIUM CHLORIDE 20 MEQ: 14.9 INJECTION, SOLUTION INTRAVENOUS at 09:24

## 2021-01-01 RX ADMIN — FLUDROCORTISONE ACETATE 0.2 MG: 0.1 TABLET ORAL at 17:00

## 2021-01-01 RX ADMIN — CEFAZOLIN SODIUM 2 G: 100 INJECTION, POWDER, LYOPHILIZED, FOR SOLUTION INTRAVENOUS at 14:16

## 2021-01-01 RX ADMIN — ASPIRIN 81 MG: 81 TABLET, CHEWABLE ORAL at 09:10

## 2021-01-01 RX ADMIN — BUPROPION HYDROCHLORIDE 100 MG: 100 TABLET, FILM COATED ORAL at 09:34

## 2021-01-01 RX ADMIN — SODIUM BICARBONATE 650 MG TABLET 650 MG: at 09:35

## 2021-01-01 RX ADMIN — HEPARIN SODIUM 5000 UNITS: 5000 INJECTION INTRAVENOUS; SUBCUTANEOUS at 23:24

## 2021-01-01 RX ADMIN — PROMETHAZINE HYDROCHLORIDE 25 MG: 25 TABLET ORAL at 01:29

## 2021-01-01 RX ADMIN — LIDOCAINE HYDROCHLORIDE 50 MG: 20 INJECTION, SOLUTION EPIDURAL; INFILTRATION; INTRACAUDAL; PERINEURAL at 10:19

## 2021-01-01 RX ADMIN — FLUDROCORTISONE ACETATE 0.2 MG: 0.1 TABLET ORAL at 18:34

## 2021-01-01 RX ADMIN — HYDROCORTISONE 10 MG: 5 TABLET ORAL at 23:33

## 2021-01-01 RX ADMIN — SODIUM CHLORIDE, SODIUM LACTATE, POTASSIUM CHLORIDE, AND CALCIUM CHLORIDE 150 ML/HR: 600; 310; 30; 20 INJECTION, SOLUTION INTRAVENOUS at 09:03

## 2021-01-01 RX ADMIN — DORZOLAMIDE HYDROCHLORIDE AND TIMOLOL MALEATE 1 DROP: 20; 5 SOLUTION/ DROPS OPHTHALMIC at 17:37

## 2021-01-01 RX ADMIN — SODIUM BICARBONATE 650 MG TABLET 650 MG: at 21:38

## 2021-01-01 RX ADMIN — Medication 10 ML: at 06:03

## 2021-01-01 RX ADMIN — METOPROLOL TARTRATE 12.5 MG: 25 TABLET, FILM COATED ORAL at 09:10

## 2021-01-01 RX ADMIN — SODIUM BICARBONATE 650 MG TABLET 650 MG: at 16:56

## 2021-01-01 RX ADMIN — ATORVASTATIN CALCIUM 40 MG: 40 TABLET, FILM COATED ORAL at 21:39

## 2021-01-01 RX ADMIN — ATORVASTATIN CALCIUM 40 MG: 40 TABLET, FILM COATED ORAL at 22:24

## 2021-01-01 RX ADMIN — METOPROLOL TARTRATE 12.5 MG: 25 TABLET, FILM COATED ORAL at 08:47

## 2021-01-01 RX ADMIN — Medication 1 AMPULE: at 09:00

## 2021-01-01 RX ADMIN — SODIUM BICARBONATE 650 MG TABLET 650 MG: at 09:10

## 2021-01-01 RX ADMIN — ACETAMINOPHEN 650 MG: 325 TABLET, FILM COATED ORAL at 16:56

## 2021-01-01 RX ADMIN — DOXYCYCLINE 100 MG: 100 INJECTION, POWDER, LYOPHILIZED, FOR SOLUTION INTRAVENOUS at 10:32

## 2021-01-01 RX ADMIN — CEFAZOLIN SODIUM 2 G: 100 INJECTION, POWDER, LYOPHILIZED, FOR SOLUTION INTRAVENOUS at 02:00

## 2021-01-01 RX ADMIN — ACETAMINOPHEN 650 MG: 325 TABLET, FILM COATED ORAL at 22:14

## 2021-01-01 RX ADMIN — SODIUM CHLORIDE 500 ML: 900 INJECTION, SOLUTION INTRAVENOUS at 17:56

## 2021-01-01 RX ADMIN — DORZOLAMIDE HYDROCHLORIDE AND TIMOLOL MALEATE 1 DROP: 20; 5 SOLUTION/ DROPS OPHTHALMIC at 09:11

## 2021-01-01 RX ADMIN — SODIUM BICARBONATE 650 MG TABLET 650 MG: at 21:40

## 2021-01-01 RX ADMIN — ASPIRIN 81 MG: 81 TABLET, CHEWABLE ORAL at 09:46

## 2021-01-01 RX ADMIN — ACETAMINOPHEN 650 MG: 325 TABLET, FILM COATED ORAL at 21:40

## 2021-01-01 RX ADMIN — SODIUM BICARBONATE 650 MG TABLET 650 MG: at 17:10

## 2021-01-01 RX ADMIN — SODIUM BICARBONATE 650 MG TABLET 650 MG: at 08:51

## 2021-01-01 RX ADMIN — FLUDROCORTISONE ACETATE 0.2 MG: 0.1 TABLET ORAL at 08:45

## 2021-01-01 RX ADMIN — POTASSIUM CHLORIDE 20 MEQ: 14.9 INJECTION, SOLUTION INTRAVENOUS at 12:15

## 2021-01-01 RX ADMIN — SODIUM BICARBONATE 650 MG TABLET 650 MG: at 16:47

## 2021-01-01 RX ADMIN — Medication 10 ML: at 21:38

## 2021-01-01 RX ADMIN — ASPIRIN 81 MG: 81 TABLET, CHEWABLE ORAL at 08:51

## 2021-01-01 RX ADMIN — CEFTRIAXONE SODIUM 1 G: 1 INJECTION, POWDER, FOR SOLUTION INTRAMUSCULAR; INTRAVENOUS at 12:58

## 2021-01-01 RX ADMIN — ATORVASTATIN CALCIUM 40 MG: 40 TABLET, FILM COATED ORAL at 21:59

## 2021-01-01 RX ADMIN — DORZOLAMIDE HYDROCHLORIDE AND TIMOLOL MALEATE 1 DROP: 20; 5 SOLUTION/ DROPS OPHTHALMIC at 09:49

## 2021-01-01 RX ADMIN — HYDROCORTISONE 20 MG: 20 TABLET ORAL at 05:29

## 2021-01-01 RX ADMIN — DEXTROSE MONOHYDRATE AND SODIUM CHLORIDE 75 ML/HR: 5; .45 INJECTION, SOLUTION INTRAVENOUS at 14:17

## 2021-01-01 RX ADMIN — CEFTRIAXONE SODIUM 1 G: 1 INJECTION, POWDER, FOR SOLUTION INTRAMUSCULAR; INTRAVENOUS at 13:22

## 2021-01-01 RX ADMIN — FLUDROCORTISONE ACETATE 0.2 MG: 0.1 TABLET ORAL at 17:08

## 2021-01-01 RX ADMIN — HEPARIN SODIUM 5000 UNITS: 5000 INJECTION INTRAVENOUS; SUBCUTANEOUS at 01:09

## 2021-01-01 RX ADMIN — CEFTRIAXONE SODIUM 1 G: 1 INJECTION, POWDER, FOR SOLUTION INTRAMUSCULAR; INTRAVENOUS at 12:15

## 2021-01-01 RX ADMIN — HYDROCORTISONE 10 MG: 5 TABLET ORAL at 17:01

## 2021-01-01 RX ADMIN — Medication 10 ML: at 05:51
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
## 2021-03-08 NOTE — DISCHARGE INSTRUCTIONS
Change dressing daily with nonstick dressing. Keep wounds clean. Return for signs of infection or other worsening or concerning symptoms.

## 2021-03-08 NOTE — ED NOTES
I have reviewed discharge instructions with the patient. The patient verbalized understanding. Patient left ED via Discharge Method: ambulatory to Home with spouse). Opportunity for questions and clarification provided. Patient given 0 scripts. To continue your aftercare when you leave the hospital, you may receive an automated call from our care team to check in on how you are doing. This is a free service and part of our promise to provide the best care and service to meet your aftercare needs.  If you have questions, or wish to unsubscribe from this service please call [phone]. Thank you for Choosing our [facility] Emergency Department.
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
## 2021-03-31 PROBLEM — N17.9 AKI (ACUTE KIDNEY INJURY) (HCC): Status: ACTIVE | Noted: 2021-01-01

## 2021-03-31 PROBLEM — I48.91 ATRIAL FIBRILLATION (HCC): Status: ACTIVE | Noted: 2021-01-01

## 2021-03-31 NOTE — ED PROVIDER NOTES
67-year-old male presenting for evaluation of hypotension and weakness. Patient was being evaluated for same at his primary care doctor's office when he was found to be hypotensive and transferred here. Most of the story is given by his wife as the patient has no complaints she reports that he has had ongoing weakness for couple of months that has become precipitously worse over the past few days. He has stage IV kidney disease, cardiac disease, and history of ulcerative colitis resulting in colectomy but over the past few days he has become winded even with minimal ambulation. Patient denies any chest pain or cough. He has had worsening edema in his lower extremities. He denies nausea vomiting or fever. Ileostomy output has been stable. In addition to worsening weakness he does report that he fell several days ago striking his knee on the ground having increased difficulty ambulating due to this. The history is provided by the patient, the spouse and the EMS personnel. Hypotension This is a new problem. The problem has been resolved. Associated symptoms include weakness. Pertinent negatives include no confusion, no somnolence, no seizures, no unresponsiveness, no agitation, no delusions, no hallucinations, no self-injury, no violence, no tingling and no numbness. Mental status baseline is normal.  His past medical history is significant for heart disease. His past medical history does not include diabetes, seizures, liver disease, CVA, TIA, AIDS, hypertension, COPD, depression, dementia, psychotropic medication treatment or head trauma. Past Medical History:  
Diagnosis Date  Arthritis  CAD (coronary artery disease) 2005 W STENTS + CABGX4, NO MI  
 Cancer ([facility] [address] Non Hodgkins Lymphoma  Chronic kidney disease Acute renal failure.  Chronic kidney disease 11/2020 Stage 4 CRF  Chronic ulcerative colitis ([facility] [address] 1998 WITH ILEOSTOMY  Glaucoma 2014  Hypertension MID 90S  Ill-defined condition   
 iliostomy  Psychiatric disorder Depression  PUD (peptic ulcer disease) ULCERATIVE COLITIS  Pulmonary edema 2005 AFTER CABG  Retroperitoneal hemorrhage Past Surgical History:  
Procedure Laterality Date [address] [facility] [address] ILEOSTOMY FOR ULCERATIVE COLITIS  
 HX UROLOGICAL  IL ABDOMEN SURGERY PROC UNLISTED    
 ileostomy  IL CABG, ARTERY-VEIN, FOUR   AND STENTS X 2 PRIOR TO CABG  VASCULAR SURGERY PROCEDURE UNLIST CABG Family History:  
Problem Relation Age of Onset  Heart Disease Father  Heart Disease Mother  Other Daughter Social History Socioeconomic History  Marital status:  Spouse name: Not on file  Number of children: Not on file  Years of education: Not on file  Highest education level: Not on file Occupational History  Not on file Social Needs  Financial resource strain: Not on file  Food insecurity Worry: Not on file Inability: Not on file  Transportation needs Medical: Not on file Non-medical: Not on file Tobacco Use  Smoking status: Former Smoker Years: 15.00 Quit date: 2000 Years since quittin.9  Smokeless tobacco: Never Used Substance and Sexual Activity  Alcohol use: Yes Alcohol/week: 11.7 standard drinks Types: 14 Glasses of wine per week Comment: WINE or cocktail 2/night  Drug use: No  
 Sexual activity: Not on file Lifestyle  Physical activity Days per week: Not on file Minutes per session: Not on file  Stress: Not on file Relationships  Social connections Talks on phone: Not on file Gets together: Not on file Attends [employer] service: Not on file Active member of club or organization: Not on file Attends meetings of clubs or organizations: Not on file Relationship status: Not on file  Intimate partner violence   Fear of current or ex partner: Not on file Emotionally abused: Not on file Physically abused: Not on file Forced sexual activity: Not on file Other Topics Concern  Not on file Social History Narrative 10/20/14:  PATIENT IS  TO [facility]ACKIE. HE IS A RETIRED . THEY HAVE ONE DAUGHTER, AN , WHO LIVES IN [place]. ALLERGIES: Levaquin [levofloxacin] Review of Systems Constitutional: Positive for activity change. Respiratory: Positive for shortness of breath. Musculoskeletal: Positive for arthralgias and gait problem. Neurological: Positive for weakness. Negative for tingling, seizures and numbness. Psychiatric/Behavioral: Negative for agitation, confusion, hallucinations and self-injury. All other systems reviewed and are negative. Vitals:  
 03/31/21 1507 03/31/21 1531 03/31/21 1532 BP: 113/62 (!) 115/59 Pulse: 78 79 Resp: 15 15 SpO2: 94%  100% Weight: 77.1 kg (170 lb) Height: 6' 3\" (1.905 m) Physical Exam 
Vitals signs and nursing note reviewed. Constitutional:   
   Appearance: He is well-developed. HENT:  
   Head: Normocephalic and atraumatic. Eyes:  
   Conjunctiva/sclera: Conjunctivae normal.  
   Pupils: Pupils are equal, round, and reactive to light. Neck: Musculoskeletal: Normal range of motion and neck supple. Cardiovascular:  
   Rate and Rhythm: Normal rate and regular rhythm. Heart sounds: Normal heart sounds. Pulmonary:  
   Effort: Pulmonary effort is normal.  
   Breath sounds: Normal breath sounds. Abdominal:  
   General: Bowel sounds are normal.  
   Palpations: Abdomen is soft. Musculoskeletal: Normal range of motion. General: Tenderness and signs of injury present. No deformity. Right lower leg: Edema present. Left lower leg: Edema present. Comments: Small effusion, tenderness swelling around the right knee with resolving ecchymosis Skin: 
   General: Skin is warm and dry. Neurological:  
   Mental Status: He is alert and oriented to person, place, and time. Cranial Nerves: No cranial nerve deficit. Psychiatric:     
   Behavior: Behavior normal.  
 
  
 
MDM Number of Diagnoses or Management Options Diagnosis management comments: 66-year-old male presenting for weakness worsening lower extremity edema and transient episode of hypotension. Sounds like it is orthostatic. Patient has no complaints while resting. We will do chest x-ray, knee x-ray, EKG, heart failure labs and reevaluate. Currently no concern for infection. EKG was performed upon arrival and shows A. fib with rate of 96 Left axis deviation QRS is 138 QTC is 493 No acute ST changes Amount and/or Complexity of Data Reviewed Clinical lab tests: ordered and reviewed (Results for orders placed or performed during the hospital encounter of 03/31/21 
-CBC WITH AUTOMATED DIFF Result                      Value             Ref Range WBC                         12.9 (H)          4.3 - 11.1 K* 
     RBC                         2.93 (L)          4.23 - 5.6 M* HGB                         10.0 (L)          13.6 - 17.2 * HCT                         31.3 (L)          41.1 - 50.3 % MCV                         106.8 (H)         79.6 - 97.8 * MCH                         34.1 (H)          26.1 - 32.9 * MCHC                        31.9              31.4 - 35.0 * RDW                         18.4 (H)          11.9 - 14.6 % PLATELET                    117 (L)           150 - 450 K/* MPV                         12.5 (H)          9.4 - 12.3 FL ABSOLUTE NRBC               0.04              0.0 - 0.2 K/* DF                          AUTOMATED NEUTROPHILS                 88 (H)            43 - 78 % LYMPHOCYTES                 2 (L)             13 - 44 %      MONOCYTES 6                 4.0 - 12.0 % EOSINOPHILS                 2                 0.5 - 7.8 % BASOPHILS                   0                 0.0 - 2.0 % IMMATURE GRANULOCYTES       2                 0.0 - 5.0 %   
     ABS. NEUTROPHILS            11.3 (H)          1.7 - 8.2 K/* ABS. LYMPHOCYTES            0.3 (L)           0.5 - 4.6 K/* ABS. MONOCYTES              0.7               0.1 - 1.3 K/* ABS. EOSINOPHILS            0.3               0.0 - 0.8 K/* ABS. BASOPHILS              0.0               0.0 - 0.2 K/* ABS. IMM. GRANS.            0.3               0.0 - 0.5 K/* 
-METABOLIC PANEL, COMPREHENSIVE Result                      Value             Ref Range Sodium                      143               136 - 145 mm* Potassium                   3.5               3.5 - 5.1 mm* Chloride                    116 (H)           98 - 107 mmo* CO2                         15 (L)            21 - 32 mmol* Anion gap                   12                7 - 16 mmol/L Glucose                     83                65 - 100 mg/* BUN                         80 (H)            8 - 23 MG/DL Creatinine                  5.16 (H)          0.8 - 1.5 MG* 
     GFR est AA                  14 (L)            >60 ml/min/1* GFR est non-AA              11 (L)            >60 ml/min/1* Calcium                     8.4               8.3 - 10.4 M* Bilirubin, total            0.8               0.2 - 1.1 MG* ALT (SGPT)                  37                12 - 65 U/L   
     AST (SGOT)                  39 (H)            15 - 37 U/L Alk. phosphatase            76                50 - 136 U/L Protein, total              5.8 (L)           6.3 - 8.2 g/* Albumin                     2.5 (L)           3.2 - 4.6 g/* Globulin                    3.3               2.3 - 3.5 g/*      A-G Ratio 0.8 (L)           1.2 - 3.5     
-LACTIC ACID Result                      Value             Ref Range Lactic acid                 1.1               0.4 - 2.0 MM* 
-NT-PRO BNP Result                      Value             Ref Range NT pro-BNP                  19,666 (H)        <450 PG/ML    
-TROPONIN-HIGH SENSITIVITY Result                      Value             Ref Range Troponin-High Sensitiv*     767.1 (HH)        0 - 14 pg/mL  
-EKG, 12 LEAD, INITIAL Result                      Value             Ref Range Ventricular Rate            96                BPM           
     Atrial Rate                 50                BPM           
     QRS Duration                138               ms Q-T Interval                390               ms            
     QTC Calculation (Bezet)     492               ms            
     Calculated R Axis           -54               degrees Calculated T Axis           109               degrees Diagnosis                                                   
 !! AGE AND GENDER SPECIFIC ECG ANALYSIS !! Atrial fibrillation with premature ventricular or aberrantly conducted  
 complexes Left axis deviation Right bundle branch block T wave abnormality, consider lateral ischemia or digitalis effect Abnormal ECG When compared with ECG of 02-MAR-2020 07:02, Atrial fibrillation has replaced Sinus rhythm Right bundle branch block is now Present 
  
) Tests in the radiology section of CPT®: ordered and reviewed (Xr Elbow Rt Min 3 V Result Date: 3/8/2021 RIGHT ELBOW SERIES HISTORY:  Er -Pt states rt elbow pain and swelling. Pt states he was standing by running  then it started to move and dragged him with it, then fell on top of him FINDINGS: There is no displaced fracture or dislocation. Soft tissue swelling is present posterior to the olecranon.   
 
Soft tissue swelling in the posterior elbow. Xr Chest [facility] Result Date: 3/31/2021 Portable chest x-ray CLINICAL INDICATION: Shortness of breath two months FINDINGS: Single AP view of the chest compared to a similar exam dated 10/20/2014 show the lungs be well-expanded and clear. No pleural effusion or pneumothorax. Post CABG changes are present. The bones are normal.  
 
No acute cardiopulmonary abnormality. Xr Knee Rt 3 V Result Date: 3/31/2021 Right knee CLINICAL INDICATION: Right knee pain after a fall FINDINGS: Three views of the right knee show the joint spaces to be maintained. There is mild chondrocalcinosis in the lateral compartment. Degenerative arthrosis is noted in the patellofemoral compartment with a prominent enthesophyte off the inferior patella. There is no fracture. Mild CPPD suspected. No acute fracture. ) Tests in the medicine section of CPT®: ordered and reviewed Decide to obtain previous medical records or to obtain history from someone other than the patient: yes Discuss the patient with other providers: yes Independent visualization of images, tracings, or specimens: yes Risk of Complications, Morbidity, and/or Mortality Presenting problems: high Diagnostic procedures: high Management options: high General comments: I personally reviewed the patient's vital signs, laboratory tests, and/or radiological findings. I discussed these findings with the patient and their significance. I answered all questions and explained that given these findings there is significant concern for increased morbidity and/or mortality without immediate intervention. As a result, I recommended admission to the hospital, consulted the appropriate service, and transitioned care to that service in improved condition Patient Progress Patient progress: stable ED Course as of Mar 31 1809 Wed Mar 31, 2021  
1640 We are reaching out to the lab because the patient's blood work is not running. [JS]  
2684 Patient has elevated troponin but no chest pain. Likely secondary to renal failure and heart failure. [JS] [facility] cardiology to support management with the hospitalist service due to patient's combination of renal failure and heart failure. [JS] 5734 Discussed case with cardiology who will assist to the hospitalist service with managing the patient's cardiac output. [JS] ED Course User Index [JS] Sindy Choudhury MD  
 
 
Procedures

## 2021-03-31 NOTE — ED TRIAGE NOTES
Arrived by ems from internal medicine ass. Called out for hypotension and shob. Their BP was 82/44. Per EMS /86. Pt has fx right patella. PT on RA. Labs drawn at facility.

## 2021-03-31 NOTE — PROGRESS NOTES
TRANSFER - IN REPORT: 
 
Verbal report received from [facility] on [address]  being received from ED(unit) for routine progression of care Report consisted of patients Situation, Background, Assessment and  
Recommendations(SBAR). Information from the following report(s) SBAR was reviewed with the receiving nurse. Opportunity for questions and clarification was provided. Assessment completed upon patients arrival to unit and care assumed.

## 2021-03-31 NOTE — H&P
[address] [facility] Service History and Physical 
 
Patient ID: Ivana Barrera 
male 1940 
[id number] Admission Date: 3/31/2021 Chief Complaint: SOB, PACHECO Reason for Admission: ARACELIS on CKD ASSESSMENT & PLAN: 
 
#ARACELIS on CKD4 
- baseline Cr ~1.6, Cr on admission 5.16 
- likely cardiorenal, but patient not overtly hypervolemic 
- UA unremarkable - Urine Cr and Na ordered 
- RASTA unremarkable - euvolemic/hypovolemic on exam, although NT pro-BNP 19k, no previous BNP to compare - s/p 500cc bolus, will try Albumin + low dose Lasix overnight 
- cont sodium bicarb  
- consult nephrology for AM 
 
#Afib 
- HR  in Afib, EKG with Afib 
- trop [id number] likely due to Atrial Fibrillation  
- trial low dose lopressor BID 
- keep K>4, Mg>2 - check Mg, replace with 20meq KCl  
- not on Eliquis due to fall risk - Lopressor IV prn for RVR 
- consult cardiology #Leukocytosis - WBC 12.9, no clear source of infection - R knee XR with mild CPPD suspected - UA and CXR unremarkable - possibly related to pseudogout R knee vs steroid administration as below #R Knee Pseudogout  
- not candidate for colchicine or NSAIDs due to ARACELIS on CKD 
- consider orthopedic surgery consult for injection #Adrenal Insufficiency  
- cont with home hydrocortisone, fludrocortisone #HFrEF 
- EF 25% on most recent ECHO 
- hypotension and orthostasis limiting HF medications 
- try on low dose lopressor BID for rate control, hold for SBP < 100 #CAD s/p CABG 
- cont ASA, statin 
- trial Lopressor BID #Acute on Chronic Anemia - , check B12/folate 
- Hgb 10, baseline 12-13 
- no obvious blood loss 
- transfuse for Hgb<8 in setting of CAD #GERD - cont PPI Disposition: admit to med-surg for evaluation and treatment of above. Further dispo pending clinical course. Diet: renal  
VTE ppx: heparin subq GI ppx: PPI 
CODE STATUS: extensively discussed with patient and wife at bedside, agreeable to DNR, spent 20 minutes discussing overall medical conditions, as well as prognosis. Surrogate decision-maker: wife, at bedside, Tylor Calero, [phone] HISTORY OF PRESENT ILLNESS: 
Patient is a 78 y.o. 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 Patient somewhat poor historian, so most of history obtained from wife at bedside. Patient's wife states that he has not been able to walk more than 10 feet without getting severely short of breath and lightheaded/dizzy. She states that his blood pressure has been dropping as low as 70 systolic whenever he stands up. He had 1 fall earlier this month (March 8th) when he fell on his right side. He now has significant right knee tenderness and pain with obvious erythema on exam.  
In the ED, pt found to have severe ARACELIS on CKD with creatinine of 5. He denies any decreased urination, dysuria, suprapubic pain. Wife states that he is not on any diuretics at home, but per chart review he takes torsemide 20 mg daily. He did have some medication changes within the last month including adding low-dose Toprol and Eliquis for atrial fibrillation found on temporary cardiac monitoring. Patient was subsequently taken off Eliquis due to fall risk. Blood Pressure ok in -217 systolic. HR between  in atrial fibrillation. Pt breathing comfortably on RA. Allergies Allergen Reactions  Levaquin [Levofloxacin] Other (comments) Acute renal failure. Prior to Admission Medications Prescriptions Last Dose Informant Patient Reported? Taking?  
amoxicillin (AMOXIL) 500 mg capsule   Yes No  
Sig: Take 500 mg by mouth as needed. For dental appointments   
apixaban (Eliquis) 5 mg tablet   Yes No  
Sig: Take 5 mg by mouth two (2) times a day. ascorbic acid (VITAMIN C) 500 mg tablet   Yes No  
Sig: Take 500 mg by mouth daily. aspirin 81 mg chewable tablet   Yes No  
Sig: Take 1 Tab by mouth. atorvastatin (Lipitor) 40 mg tablet   No No  
Sig: Take 1 Tab by mouth nightly. buPROPion SR (WELLBUTRIN SR) 100 mg SR tablet   Yes No  
Sig: Take  by mouth daily. dorzolamide-timoloL (Cosopt) 22.3-6.8 mg/mL ophthalmic solution   Yes No  
Sig: Administer 1 Drop to both eyes two (2) times a day. fludrocortisone (FLORINEF) 0.1 mg tablet   Yes No  
Sig: Take 0.2 mg by mouth two (2) times a day. Takes 0.2mg oral/ Gastric tube twice a day  
folic acid 614 mcg tablet   Yes No  
Sig: Take 800 mcg by mouth daily. gabapentin (NEURONTIN) 100 mg capsule   Yes No  
Sig: Take 1-2 capsules daily as need for postherpetic neuralgia pain  
hydrocortisone (CORTEF) 10 mg tablet   Yes No  
Sig: Take 10 mg by mouth three (3) times daily. Takes 2 tablets in the am, 1 tablet at lunch & 1 tablet in the pm  
latanoprost (XALATAN) 0.005 % ophthalmic solution   Yes No  
Sig: Administer 1 drop to both eyes nightly. metoprolol succinate (TOPROL-XL) 25 mg XL tablet   No No  
Sig: Take 1 Tab by mouth daily. netarsudiL (Rhopressa) 0.02 % drop   Yes No  
Sig: Apply  to eye.  
nitroglycerin (NITROSTAT) 0.4 mg SL tablet   No No  
Si Tab by SubLINGual route every five (5) minutes as needed for Chest Pain.  
pantoprazole (Protonix) 40 mg tablet   Yes No  
Sig: Take 40 mg by mouth daily. potassium chloride SR (KLOR-CON 10) 10 mEq tablet   Yes No  
Sig: Take 20 mEq by mouth two (2) times a day. sodium bicarbonate 650 mg tablet   Yes No  
Sig: Take 650 mg by mouth daily. torsemide (DEMADEX) 20 mg tablet   No No  
Sig: Take 1 Tab by mouth daily. Facility-Administered Medications: None Past Medical History:  
Diagnosis Date  Arthritis  CAD (coronary artery disease)  W STENTS + CABGX4, NO MI  
 Cancer ([facility] [address] Non Hodgkins Lymphoma  Chronic kidney disease Acute renal failure.  Chronic kidney disease 2020 Stage 4 CRF  Chronic ulcerative colitis ([facility] [address]  WITH ILEOSTOMY  Glaucoma 2014  Hypertension MID 90S  Ill-defined condition   
 iliostomy  Psychiatric disorder Depression  PUD (peptic ulcer disease) ULCERATIVE COLITIS  Pulmonary edema 2005 AFTER CABG  Retroperitoneal hemorrhage Past Surgical History:  
Procedure Laterality Date 5 [facility] [address] ILEOSTOMY FOR ULCERATIVE COLITIS  
 HX UROLOGICAL  NM ABDOMEN SURGERY PROC UNLISTED    
 ileostomy  NM CABG, ARTERY-VEIN, FOUR   AND STENTS X 2 PRIOR TO CABG  VASCULAR SURGERY PROCEDURE UNLIST CABG Social History Tobacco Use  Smoking status: Former Smoker Years: 15.00 Quit date: 2000 Years since quittin.9  Smokeless tobacco: Never Used Substance Use Topics  Alcohol use: Yes Alcohol/week: 11.7 standard drinks Types: 14 Glasses of wine per week Comment: WINE or cocktail 2/night FAMILY HISTORY:   
Family History Problem Relation Age of Onset  Heart Disease Father  Heart Disease Mother  Other Daughter REVIEW OF SYSTEMS: 
A 14 point review of systems was taken and pertinent positive as per HPI. PHYSICAL EXAM: 
 
Visit Vitals BP (!) 155/64 Pulse 91 Temp 98.2 °F (36.8 °C) Resp 15 Ht 6' 3\" (1.905 m) Wt 77.1 kg (170 lb) SpO2 97% BMI 21.25 kg/m² General: No acute distress, speaking in full sentences, no use of accessory muscles HEENT: Pupils equal and reactive to light and accommodation, oropharynx is clear Neck: Supple, no JVD Lungs: Clear to auscultation bilaterally Cardiovascular: Irregularly irregular with normal S1 and S2 Abdomen: Soft, nontender, nondistended, normoactive bowel sounds Extremities: No cyanosis clubbing. 1+ Edema RLE>LLE Neuro: Nonfocal, A&O x3  
Psych: Normal mood and affect Intake/Output last 3 shifts: 
 
 
 
Labs: 
CMP:  
Lab Results Component Value Date/Time  03/31/2021 03:14 PM  
 K 3.5 03/31/2021 03:14 PM  
  (H) 03/31/2021 03:14 PM  
 CO2 15 (L) 03/31/2021 03:14 PM  
 AGAP 12 03/31/2021 03:14 PM  
 GLU 83 03/31/2021 03:14 PM  
 BUN 80 (H) 03/31/2021 03:14 PM  
 CREA 5.16 (H) 03/31/2021 03:14 PM  
 GFRAA 14 (L) 03/31/2021 03:14 PM  
 GFRNA 11 (L) 03/31/2021 03:14 PM  
 CA 8.4 03/31/2021 03:14 PM  
 ALB 2.5 (L) 03/31/2021 03:14 PM  
 TBILI 0.8 03/31/2021 03:14 PM  
 TP 5.8 (L) 03/31/2021 03:14 PM  
 GLOB 3.3 03/31/2021 03:14 PM  
 AGRAT 0.8 (L) 03/31/2021 03:14 PM  
 ALT 37 03/31/2021 03:14 PM  
 
 
 
CBC:   
Lab Results Component Value Date/Time WBC 12.9 (H) 03/31/2021 03:14 PM  
 HGB 10.0 (L) 03/31/2021 03:14 PM  
 HCT 31.3 (L) 03/31/2021 03:14 PM  
  (L) 03/31/2021 03:14 PM  
 
 
Lab Results Component Value Date/Time INR 1.0 05/04/2015 11:03 AM  
 Prothrombin time 10.5 05/04/2015 11:03 AM  
 
 
ABG:  No results found for: PH, PHI, PCO2, PCO2I, PO2, PO2I, HCO3, HCO3I, FIO2, FIO2I Lab Results Component Value Date/Time CK 58 10/20/2014 07:12 PM  
 Troponin-I, Qt. <0.02 (L) 10/20/2014 07:12 PM  
 
 
 
Imaging & Other Studies: XR Results (maximum last 3): Results from [facility] [place] encounter on 03/31/21 XR KNEE RT 3 V Narrative Right knee CLINICAL INDICATION: Right knee pain after a fall FINDINGS: Three views of the right knee show the joint spaces to be maintained. There is mild chondrocalcinosis in the lateral compartment. Degenerative 
arthrosis is noted in the patellofemoral compartment with a prominent 
enthesophyte off the inferior patella. There is no fracture. Impression Mild CPPD suspected. No acute fracture. XR CHEST PORT Narrative Portable chest x-ray CLINICAL INDICATION: Shortness of breath two months FINDINGS: Single AP view of the chest compared to a similar exam dated 10/20/2014 show the lungs be well-expanded and clear. No pleural effusion or 
pneumothorax. Post CABG changes are present. The bones are normal. 
  
 Impression No acute cardiopulmonary abnormality. [place] [facility] [place]21 XR ELBOW RT MIN 3 V Narrative RIGHT ELBOW SERIES HISTORY:  Er -Pt states rt elbow pain and swelling. Pt states he was standing by 
running  then it started to move and dragged him with it, then fell on 
top of him FINDINGS: There is no displaced fracture or dislocation. Soft tissue swelling is 
present posterior to the olecranon. Impression Soft tissue swelling in the posterior elbow. CT Results (maximum last 3): No results found for this or any previous visit. MRI Results (maximum last 3): Results from Hospital Encounter encounter on 09/19/17 MRI BRAIN W WO CONT Narrative MRI BRAIN WITHOUT AND WITH CONTRAST:  9/19/2017 HISTORY:  Dizziness with visual field defect and pallor of the optic disc. History of lymphoma. TECHNIQUE:  Sagittal and axial T1-weighted, axial T2-weighted, axial FLAIR, 
axial T2-weighted gradient-echo, axial diffusion weighted images with ADC maps 
and postcontrast axial and coronal T1-weighted images of the brain. MultiHance 
gadolinium contrast 15 mL was administered intravenously without adverse event 
to evaluate for pathological leptomeningeal or parenchymal enhancement. Additional pre- and post-contrast multiplanar multisequence MR imaging of the 
orbits was performed. COMPARISON:  10/21/2014. FINDINGS:  There is no acute infarction, intracranial hemorrhage, intra-axial 
mass, hydrocephalus, or abnormal extra-axial fluid collection. Moderate diffuse 
cerebral volume loss is present. On the T2-weighted and FLAIR sequences, there 
are scattered hyperintense white matter lesions.  This is not an uncommon finding 
which may be present with asymptomatic patients, with migraine headaches or with 
mild chronic small vessel ischemic disease. An old right cerebellar lacunar 
infarct is new compared to the 2014 MRI. There is no abnormal parenchymal or 
leptomeningeal enhancement. MRI Orbits: The bilateral globes are symmetric in morphology. The bilateral 
optic nerves are symmetric in diameter and signal intensity. There is no 
abnormal perineural enhancement. The extraocular muscle complexes are normal in 
appearance. The lacrimal glands are normal in appearance. Intraorbital fat is 
normal in signal intensity. The optic chiasm is thin without adjacent 
suprasellar masses. The cavernous sinuses enhance symmetrically. There is an opacified right ethmoid air cell. Impression IMPRESSION: 
 
1. Unremarkable MRI orbits. 2.  Moderate cerebral volume loss, white matter findings compatible with mild 
chronic small vessel ischemic disease and late subacute or older right 
cerebellar lacunar infarct. Results from Hospital Encounter encounter on 10/20/14 MRI BRAIN WO CONT Narrative MRI brain without contrast: 10/21/2014 History: syncope. History of ulcerative colitis, memory disturbance, tremors, 
myoclonus, nausea Imaging sequences: Sagittal short TR/short TE, axial short TR/short TE, long TR/long TE, FLAIR, gradient recall, diffusion weighted images and ADC mapping. Coronal FLAIR. Imaging was performed on a 1.5 Sarah magnet. Comparison: None Findings: The ventricles and sulci are at the upper limits of normal. There are 
no extra-axial fluid collections. Normal flow voids are present within all of 
the major intracranial vessels. No evidence of intraparenchymal hemorrhage or 
mass effect is identified. There are no areas of restricted diffusion to 
suggest an acute or subacute infarction. Few punctate foci of T2 prolongation 
are present within the supratentorial white matter, of doubtful clinical 
significance in a patient of this age.  An incidental prominent perivascular 
space resides within the inferior left basal ganglia. There is mild mucosal thickening within right ethmoid air cells and left 
maxillary sinus. There is a more rounded focus of T1 hyperintensity within the 
right ethmoid air cells anteriorly which could represent a retention cyst with 
inspissated secretions. Impression Impression: Unremarkable unenhanced MRI of the brain for age. Nuclear Medicine Results (maximum last 3): No results found for this or any previous visit. US Results (maximum last 3): Results from Hospital Encounter encounter on 10/20/14 [address] [place] [address] Narrative Renal ultrasound. History: Renal failure. Comparison: None. Technique: Direct skin contact sector 3-5 MHz images of the kidneys are 
obtained. Findings: Renal echogenicity within normal limits. Right kidney is mildly 
hypoechoic compared to the liver. Right kidney: 11.2 cm in length. No hydronephrosis. Left kidney: 12.0 cm in length . No hydronephrosis. Limited views of bladder is drained by Villa catheter. Impression IMPRESSION:   Unremarkable bilateral renal ultrasound. DEXA Results (maximum last 3): No results found for this or any previous visit. SAMY Results (maximum last 3): No results found for this or any previous visit. IR Results (maximum last 3): No results found for this or any previous visit. VAS/US Results (maximum last 3): Results from Hospital Encounter encounter on 10/20/14 DUPLEX CAROTID BILATERAL Narrative Examination: Carotid ultrasound. Comparison: None. Indication: Coronary artery disease, peripheral vascular disease, dizziness, 
hypertension. Syncope. Findings: Color and power Doppler imaging as well as spectral analysis of the 
bilateral extracranial carotid systems were performed. On the right, there is moderate scattered plaque, predominantly in the carotid 
bulb and proximal ICA.  There is no hemodynamically significant stenosis. The 
vertebral artery has antegrade flow. Peak systolic velocities are as follows: CCA 90; ICA 94; ECA 90 cm/s. ICA/CCA ratio 1.0. On the left, there is marked scattered plaque, predominantly in the carotid bulb 
and proximal ICA. There is no hemodynamically significant stenosis. The 
vertebral artery has antegrade flow. Peak systolic velocities are as follows: CCA 95; ; ECA 69 cm/s. ICA/CCA ratio 1.3. Impression Impression: 1. Bilateral atherosclerotic plaque as above. Spectral waveform evaluation is 
not consistent with hemodynamically significant stenosis. However, with visual 
inspection, there is marked atherosclerotic change at the left bifurcation with 
greater than 50% stenosis visually. CTA carotids recommended for further 
evaluation. PET Results (maximum last 3): No results found for this or any previous visit. EKG Results Procedure 720 Value Units Date/Time EKG 12 LEAD INITIAL [[id number]] Collected: 03/31/21 1508 Order Status: Completed Updated: 03/31/21 1552 Ventricular Rate 96 BPM   
  Atrial Rate 50 BPM   
  QRS Duration 138 ms Q-T Interval 390 ms QTC Calculation (Bezet) 492 ms Calculated R Axis -54 degrees Calculated T Axis 109 degrees Diagnosis --  
  !! AGE AND GENDER SPECIFIC ECG ANALYSIS !! Atrial fibrillation with premature ventricular or aberrantly conducted  
complexes Left axis deviation Right bundle branch block T wave abnormality, consider lateral ischemia or digitalis effect Abnormal ECG When compared with ECG of 02-MAR-2020 07:02, Atrial fibrillation has replaced Sinus rhythm Right bundle branch block is now Present EKG, 12 LEAD, INITIAL [[id number]] Order Status: Completed Active Problems: 
Patient Active Problem List  
 Diagnosis Date Noted  ARACELIS (acute kidney injury) ([facility] [address] 03/31/2021  Chronic renal failure, stage 4 (severe) ([facility] [address] 11/12/2020  Anemia 11/12/2020  Ischemic cardiomyopathy 11/12/2020  Abnormal cardiovascular stress test 02/27/2020  LV dysfunction 02/03/2020  Atherosclerosis of native coronary artery of native heart without angina pectoris 12/26/2019  Adrenal insufficiency due to cancer therapy ([facility] [address] 12/26/2019  Hypomagnesemia 10/22/2014  Syncope 10/21/2014  Orthostatic hypotension 10/21/2014  History of ileostomy 10/21/2014  Dehydration 10/21/2014  Anorexia 10/21/2014  Weakness generalized 10/21/2014  Fatigue 10/21/2014  Malaise 10/21/2014  S/P CABG x 4 10/21/2014  Hypertension 10/21/2014  History of ulcerative colitis 10/21/2014  Acute renal failure ([facility] [address] 10/20/2014 Tr Das MD 
[facility] 3/31/2021 6:52 PM

## 2021-03-31 NOTE — ED NOTES
TRANSFER - OUT REPORT: 
 
Verbal report given to ivy poe(name) on Gallo Martinez  being transferred to  [phone] (unit) for routine progression of care Report consisted of patients Situation, Background, Assessment and  
Recommendations(SBAR). Information from the following report(s) ED Summary was reviewed with the receiving nurse. Lines:  
Peripheral IV 03/31/21 Left Antecubital (Active) Opportunity for questions and clarification was provided. Patient transported with: 
Richar

## 2021-04-01 PROBLEM — I48.0 PAF (PAROXYSMAL ATRIAL FIBRILLATION) (HCC): Status: ACTIVE | Noted: 2021-01-01

## 2021-04-01 PROBLEM — J84.10 PULMONARY INTERSTITIAL FIBROSIS (HCC): Status: ACTIVE | Noted: 2021-01-01

## 2021-04-01 NOTE — PROGRESS NOTES
03/31/21 2045 Dual Skin Pressure Injury Assessment Dual Skin Pressure Injury Assessment X Second Care Provider (Based on [address] [place] [address]) Benito Gonzalez RN Buttocks/Ishium  Left Skin Integumentary Skin Integumentary (WDL) X Skin Color Ecchymosis (comment); Navid 
(scattered throughout the body, navid right knee cap ) Skin Condition/Temp Dry;Fragile; Warm  
Skin Integrity Abrasion;Tear;Other (comment) (open area on left buttocks, covered with a bandaid) Turgor Epidermis thin w/ loss of subcut tissue Hair Growth Sparce Varicosities Present Dual skin assessment completed with Benito Gonzalez RN. Excessive bruising and skin tears scattered throughout body. Navid right knee and rue. Noted an Open area on left buttock. Area was covered with a bandaid. Pt also has multiple scars and Ileostomy placed on RLQ of abdomen.

## 2021-04-01 NOTE — PROGRESS NOTES
Hourly rounds completed. All needs met. Pt c/o pain. Interventions per MAR. Majority of admission database completed. Was not able to complete the suicide assessment due to pt's confusion. Pt's daughter remains at the bedside. Will give report to the oncoming dayshift nurse.

## 2021-04-01 NOTE — PROGRESS NOTES
Unable to obtain weight pt unable to stand to use stand scale. Bed scale incorrect pt unable to transfer to new bed r/t discomfort. Pt repositioned for comfort. No further pain/ comfort intervention need at this time.

## 2021-04-01 NOTE — H&P
[facility] Initial Cardiac Evaluation Date of  Admission: 3/31/2021  2:48 PM  
 
Primary Care Physician: Anthony Ivey MD 
Primary Cardiologist: Dr Marlene Pineda Referring Physician: Dr Georgina Huntley Attending Physician: Dr Arsh Carney CC: ICM Francisca Barclay is a 78 y.o. male admitted for ARACELIS (acute kidney injury) ([facility] [address] [N17.9]. He has a h/o CKD, orthostatic hypotension, B cell lymphoma, iron def anemia and thrombocytopenia, ulcerative colitis s/p colectomy, adrenal insufficiency, ICM w echo 1-2021 w EF 24%, mild-mod RV systolic dysfunction, mod MR. He has a h/o CAD s/p CABG in 2006 w [facility] 3/2020 w 4/4 bypass grafts patent (LIMA to LAD, SVG to OM1, SVG to OM2, SVG to PDA). He has a h/o PAF and wore a zio monitor for 13 days 2-22-21 w 2% a fib. His eliquis has been on hold for falls. CT chest 2020 at [facility] showed Insterstitial fibrosis. He has taken torsemide intermittently for LE edema and weight gain but has had increased PACHECO for several weeks. No CP, orthopnea, cough or F/C. LE edema and weight gain improve w torsemide but dyspnea is unchanged. He is always dizzy, worse when standing, without syncope. He was seen by his PCP yesterday for SOB and hypotension referred to the ER where WBC 12.4, hgb 10, platelets 376, , K 3.1, cr 516 up from 1.42. PBNP 19,666, procal .54, CXR no acute process, HS trop 767 to 718, EKG read as a fib but a lot of artifact-poor baseline- on monitor he has been NSR w PACs. /52. He was given a 500 cc bolus of fluids and 20 mg IV lasix and volume status is + 500. SOB improved but pt has a garcia and has not been out of bed. Nephrology has been consulted, cr today 4.83. Ultrasound negative for LE DVT. Patient Active Problem List  
Diagnosis Code  Acute renal failure (HCC) N17.9  Syncope R55  Orthostatic hypotension I95.1  History of ileostomy Z98.890  
 Dehydration E86.0  Anorexia R63.0  Weakness generalized R53.1  Fatigue R53.83  
 Malaise R53.81  
 S/P CABG x 4 Z95.1  Hypertension I10  
 History of ulcerative colitis Z87.19  
 Hypomagnesemia E83.42  
 Atherosclerosis of native coronary artery of native heart without angina pectoris I25.10  Adrenal insufficiency due to cancer therapy ([address] E27.3  LV dysfunction I51.9  Abnormal cardiovascular stress test R94.39  
 Chronic renal failure, stage 4 (severe) (HCC) N18.4  Anemia D64.9  
 Ischemic cardiomyopathy I25.5  ARACELIS (acute kidney injury) ([address] N17.9  Atrial fibrillation ([address] I48.91 Past Medical History:  
Diagnosis Date  Arthritis  Atrial fibrillation ([address] 3/31/2021  CAD (coronary artery disease) 2005 W STENTS + CABGX4, NO MI  
 Cancer ([address] Non Hodgkins Lymphoma  Chronic kidney disease Acute renal failure.  Chronic kidney disease 2020 Stage 4 CRF  Chronic ulcerative colitis ([address] 1998 WITH ILEOSTOMY  Glaucoma 2014  Hypertension MID   Ill-defined condition   
 iliostomy  Psychiatric disorder Depression  PUD (peptic ulcer disease) ULCERATIVE COLITIS  Pulmonary edema 2005 AFTER CABG  Retroperitoneal hemorrhage Past Surgical History:  
Procedure Laterality Date 5 [facility] [address] ILEOSTOMY FOR ULCERATIVE COLITIS  
 HX UROLOGICAL  NM ABDOMEN SURGERY PROC UNLISTED    
 ileostomy  NM CABG, ARTERY-VEIN, FOUR   AND STENTS X 2 PRIOR TO CABG  VASCULAR SURGERY PROCEDURE UNLIST CABG Allergies Allergen Reactions  Levaquin [Levofloxacin] Other (comments) Acute renal failure. Family History Problem Relation Age of Onset  Heart Disease Father  Heart Disease Mother  Other Daughter Social History Tobacco Use  Smoking status: Former Smoker Years: 15.00 Quit date: 2000 Years since quittin.9  Smokeless tobacco: Never Used Substance Use Topics  Alcohol use: Yes   Alcohol/week: 11.7 standard drinks Types: 14 Glasses of wine per week Comment: WINE or cocktail 2/night Current Facility-Administered Medications Medication Dose Route Frequency  potassium bicarb-citric acid (EFFER-K) tablet 40 mEq  40 mEq Oral NOW  sodium chloride (NS) flush 5-40 mL  5-40 mL IntraVENous Q8H  
 sodium chloride (NS) flush 5-40 mL  5-40 mL IntraVENous PRN  
 acetaminophen (TYLENOL) tablet 650 mg  650 mg Oral Q6H PRN Or  
 acetaminophen (TYLENOL) suppository 650 mg  650 mg Rectal Q6H PRN  polyethylene glycol (MIRALAX) packet 17 g  17 g Oral DAILY PRN  
 bisacodyL (DULCOLAX) suppository 10 mg  10 mg Rectal DAILY PRN  promethazine (PHENERGAN) tablet 25 mg  25 mg Oral Q6H PRN Or  
 ondansetron (ZOFRAN) injection 4 mg  4 mg IntraVENous Q6H PRN  
 famotidine (PEPCID) tablet 20 mg  20 mg Oral BID PRN  
 heparin (porcine) injection 5,000 Units  5,000 Units SubCUTAneous Q8H  
 aspirin chewable tablet 81 mg  81 mg Oral DAILY  atorvastatin (LIPITOR) tablet 40 mg  40 mg Oral QHS  buPROPion (WELLBUTRIN) tablet 100 mg  100 mg Oral DAILY  pantoprazole (PROTONIX) tablet 40 mg  40 mg Oral DAILY  sodium bicarbonate tablet 650 mg  650 mg Oral DAILY  hydrocortisone (CORTEF) tablet 10 mg  10 mg Oral TID  fludrocortisone (FLORINEF) tablet 0.2 mg  0.2 mg Oral BID  albumin human 25% (BUMINATE) solution 12.5 g  12.5 g IntraVENous Q6H  
 metoprolol tartrate (LOPRESSOR) tablet 12.5 mg  12.5 mg Oral BID  metoprolol (LOPRESSOR) injection 2.5 mg  2.5 mg IntraVENous Q4H PRN Review of Symptoms: 
General: a litte varialbe weight due to fluid retention,  + weakness, no fever or chills Skin: + ecchymosis HEENT: no headache, dizziness, lightheadedness, vision changes, hearing changes, tinnitus, vertigo, sinus pressure/pain, bleeding gums, sore throat, or hoarseness Neck: no swollen glands, goiter, pain or stiffness Respiratory: no cough, sputum, hemoptysis, + dyspnea, wheezing Cardiovascular: + as per HPI Gastrointestinal: + UC, decreased appetite Urinary: + garcia Peripheral Vascular: no claudication, leg cramps, prior DVTs, swelling of calves, legs, or feet, color change, or swelling with redness or tenderness Musculoskeletal: + knee pain Psychiatric: no depression or excessive stress Neurological: no sensory or motor loss, seizures, syncope, tremors, numbness, no dementia Hematologic: + anemia, thrombocytopenia Endocrine: no thyroid problems, heat or cold intolerance, excessive sweating, polyuria, polydipsia, no  diabetes. Physical Exam 
Vitals:  
 04/01/21 7935 04/01/21 5800 04/01/21 8504 04/01/21 3197 BP:  (!) 110/47 (!) 123/52 Pulse: 99  60 99 Resp:   17 Temp:   96.8 °F (36 °C) SpO2:   95% Weight:      
Height:      
 
 
Physical Exam: 
General: Well Developed, Well Nourished, No Acute Distress, appears younger than stated age HEENT: pupils equal and round, no abnormalities noted Neck: supple, no JVD Heart: S1S2 regular w PACs, 2/6 murmur Lungs: Crackles throughout Abd: soft, nontender, nondistended, with good bowel sounds Ext: warm, no edema Skin: extensive ecchymosis particularly LUE Psychiatric: Flat affect Neurologic: Alert and oriented X 3 Cardiographics Telemetry: NSR w PACs rate 80's Labs:  
Recent Labs 04/01/21 
4970 03/31/21 
1514 * 143  
K 3.1* 3.5 MG  --  1.9 BUN 77* 80* CREA 4.83* 5.16* GLU 76 83 WBC 12.4* 12.9* HGB 8.8* 10.0* HCT 27.2* 31.3*  
* 117* Assessment/Plan: 
 
 Assessment:  
ARACELIS (acute kidney injury) ([facility] [address] (3/31/2021)- per nephrology, does not appear volume overloaded at this time, ok to try fluid resuscitation, check ultrasound Orthostatic hypotension (10/21/2014)- monitor on lopressor PAF- current NSR on lopressor, 2% a fib burden monitor 2-2021, eliquis on hold due to falls CAD S/P CABG x 4- s/p CABG in 2006 w [facility] 3/2020 w 4/4 bypass grafts patent (LIMA to LAD, SVG to OM1, SVG to OM2, SVG to PDA)- asa, statin, BB 
     
ICM-  EF 24%, mild MR/TR, does not appear volume overloaded, ok to try volume rescussitation, cont BB, no ACE/ARB due to orthostatic hypotension Anemia- monitor Pulmonary interstitial fibrosis ([facility] [address] (4/1/2021)- suspect this may be primary cause of dyspnea, consider pulmonary consult Thank you very much for this referral. We appreciate the opportunity to participate in this patient's care. We will follow along with above stated plan. Finn Poole PA-C Consulting MD: Arsh Carney ATTENDING ADDENDUM: 
 
Patient seen and examined by me. Agree with above note by physician extender. Key findings are:  No CP or PACHECO at present but severe PACHECO with minimal activity at home and here, out of proportion to clinical exam/chronic systolic function - he does not appear wet/volume overloaded clinically and has fibrotic bibasilar crackles on exam with CT chest showing fibrosis as well. His PACHECO is likely due to IPF but he is also weak, falling, with BP issues and renal failure issues as noted above. Denies diarrhea/vomiting, drinking only about 64oz fluid max with CHF history. Looks dry and renal failure acute on chronic. I think we should try gentle fluids and reassess in the AM.  Long term prognosis very poor. We will follow along and recheck labs in the AM.  Fluids IV per renal, follow closely. CV- RRR with 2/6 DALILA, 2/6 TR murmur Lungs- Clear bilaterally apically, soft bibasilar fibrotic crackles Abd- soft, nontender, nondistended Ext- no edema Plan: As above. IVF's, consider pulmonary consult, consider O2 at home and here, recheck labs in AM after hydration. IV lasix only as needed for worsening SOB/volume overload. Avoid for now. Arnold Samaniego MD 
[address] Cardiology Pager [phone]

## 2021-04-01 NOTE — CONSULTS
[facility] Reason for Consult: A on CKD Hx of Present Illness: 77 yo male admitted yesterday with increasing SOB and worsening postural hypotension. Pt has had CKD since at least 2014 when he had an episode of ARACELIS. His renal function never fully recovered. He was seen by Dr Kobi Curiel in the fall of 2020. when his creatinine was in the 2.5 range. A renal Bx in Nov 2020 showed ATN. Since than, his creatinine has drifted up into the 3.5 - 4 range. On admission yesterday, it was 5.16 and is 4.83 today. PMHx: ASHD 
            CHF - EF of 25% A Fib B Cell Lymphoma Ulcdrative Colitis Pulm Fibrosis Adrenal Insufficiency Allergies: Levaquin Medications: No current facility-administered medications on file prior to encounter. Current Outpatient Medications on File Prior to Encounter Medication Sig Dispense Refill  dorzolamide-timoloL (Cosopt) 22.3-6.8 mg/mL ophthalmic solution Administer 1 Drop to both eyes two (2) times a day.  atorvastatin (Lipitor) 40 mg tablet Take 1 Tab by mouth nightly. 90 Tab 3  
 hydrocortisone (CORTEF) 10 mg tablet Take 10 mg by mouth three (3) times daily. Takes 2 tablets in the am, 1 tablet at lunch & 1 tablet in the pm    
 fludrocortisone (FLORINEF) 0.1 mg tablet Take 0.2 mg by mouth two (2) times a day. Takes 0.2mg oral/ Gastric tube twice a day  buPROPion SR (WELLBUTRIN SR) 100 mg SR tablet Take  by mouth daily.  folic acid 979 mcg tablet Take 800 mcg by mouth daily.  apixaban (Eliquis) 5 mg tablet Take 5 mg by mouth two (2) times a day.  metoprolol succinate (TOPROL-XL) 25 mg XL tablet Take 1 Tab by mouth daily. 30 Tab 3  
 gabapentin (NEURONTIN) 100 mg capsule Take 1-2 capsules daily as need for postherpetic neuralgia pain  potassium chloride SR (KLOR-CON 10) 10 mEq tablet Take 20 mEq by mouth two (2) times a day.     
 sodium bicarbonate 650 mg tablet Take 650 mg by mouth daily.  torsemide (DEMADEX) 20 mg tablet Take 1 Tab by mouth daily. 90 Tab 3  pantoprazole (Protonix) 40 mg tablet Take 40 mg by mouth daily.  netarsudiL (Rhopressa) 0.02 % drop Apply  to eye.  nitroglycerin (NITROSTAT) 0.4 mg SL tablet 1 Tab by SubLINGual route every five (5) minutes as needed for Chest Pain. 10 Bottle 2  
 aspirin 81 mg chewable tablet Take 1 Tab by mouth.  latanoprost (XALATAN) 0.005 % ophthalmic solution Administer 1 drop to both eyes nightly.  ascorbic acid (VITAMIN C) 500 mg tablet Take 500 mg by mouth daily.  amoxicillin (AMOXIL) 500 mg capsule Take 500 mg by mouth as needed. For dental appointments Social Hx: No tobacco, ETOH Family Hx: Neg for kidney disease ROS:General ROS: negative for - fever, chills Respiratory ROS: no SOB, cough, PACHECO Cardiovascular ROS: no CP, palpitations Gastrointestinal ROS: no N&V, abdominal pain, diarrhea Genito-Urinary ROS: no difficulty voiding, dysuria Neurological ROS: no seizures, focal weekness Physical: 
 
Vitals:  
 04/01/21 2299 04/01/21 7845 04/01/21 3833 04/01/21 1477 BP:  (!) 110/47 (!) 123/52 Pulse: 99  60 99 Resp:   17 Temp:   96.8 °F (36 °C) SpO2:   95% Weight:      
Height:      
 
 
Gen: in no acute distress CV:reg rate Chest:clear Abd: soft Ext/Access: no edema Labs: 
 
Recent Labs 04/01/21 
5194 03/31/21 
1514 WBC 12.4* 12.9* HGB 8.8* 10.0* HCT 27.2* 31.3*  
* 117* Recent Labs 04/01/21 
1957 03/31/21 
1514 * 143  
K 3.1* 3.5 * 116* CO2 13* 15* GLU 76 83 BUN 77* 80* CREA 4.83* 5.16* MG  --  1.9 PHOS  --  5.8*  
CA 7.9* 8.4 ALB  --  2.5* TBILI  --  0.8 ALT  --  37 Assesment and Plans: 
 
Patient Active Problem List  
Diagnosis Code  Acute renal failure (HCC) N17.9  Syncope R55  Orthostatic hypotension I95.1  History of ileostomy Z98.890  
 Dehydration E86.0  Anorexia R63.0  Weakness generalized R53.1  Fatigue R53.83  
 Malaise R53.81  
 S/P CABG x 4 Z95.1  Hypertension I10  
 History of ulcerative colitis Z87.19  
 Hypomagnesemia E83.42  
 Atherosclerosis of native coronary artery of native heart without angina pectoris I25.10  Adrenal insufficiency due to cancer therapy ([address] E27.3  LV dysfunction I51.9  Abnormal cardiovascular stress test R94.39  
 Chronic renal failure, stage 4 (severe) (HCC) N18.4  Anemia D64.9  
 Ischemic cardiomyopathy I25.5  ARACELIS (acute kidney injury) ([address] N17.9  Atrial fibrillation ([address] I48.91 A on CKD4 - Looks like progressive CKD from Cardiorenal. He may be dry. It will be difficult to find balance between dry lungs and wet kidneys. I will try carefull hydration and watch renal function. I discussed the possibility of dialysis with him and his daughter. I told them I did not think diaysis would improve quantity or quality of life. HFrEF - per cardiology Pulm Fibrosis Thrombocytopenia Orthostatic Hypotension Adrenal Insuffciency.  
 
 
 
Columba Flood MD

## 2021-04-01 NOTE — PROGRESS NOTES
Hourly rounds performed through shift, pt denies needs at this time. Bed in low position, locked and call light/personal items within reach. Will continue to monitor and report to night shift nurse. Date 04/01/21 0700 - 04/02/21 7201 Shift [id number] 2653-1390 8316-4092 24 Hour Total  
INTAKE Other 425   425 Shift Total(mL/kg) 425(5.5)   425(5.5) OUTPUT Urine(mL/kg/hr) 425(0.7) 300  725 Shift Total(mL/kg) 425(5.5) 300(3.9)  725(9.4) Weight (kg) 77.1 77.1 77.1 77.1

## 2021-04-01 NOTE — CONSULTS
[facility] Consultation Note Patient ID: 
Name: Gallo Martinez MRN: [id number] AGE: 78 y.o. 
: 1940 Date of Consultation:  2021 Referring Physician:  Hospitalist 
 
Subjective: Pt complains of right knee pain that started 2 days ago. They presented to the [facility] Emergency Dept on 3/31 for hypotension and weakness from his PCP office. They were found to have ARACELIS on CKD. They were admitted by the hospitalist. Patient reported right knee pain. They localizes their pain to the right knee. They have pain with movement of the right knee. He denies any issues with this knee in the past. Has never had orthopedic surgery. They have no other orthopedic concerns at this time. Past Medical History Includes:  
Past Medical History:  
Diagnosis Date  Arthritis  Atrial fibrillation ([address] 3/31/2021  CAD (coronary artery disease)  W STENTS + CABGX4, NO MI  
 Cancer ([address] Non Hodgkins Lymphoma  Chronic kidney disease Acute renal failure.  Chronic kidney disease 2020 Stage 4 CRF  Chronic ulcerative colitis ([address]  WITH ILEOSTOMY  Glaucoma   Hypertension MID   Ill-defined condition   
 iliostomy  Psychiatric disorder Depression  PUD (peptic ulcer disease) ULCERATIVE COLITIS  Pulmonary edema  AFTER CABG  Retroperitoneal hemorrhage   
,  
Past Surgical History:  
Procedure Laterality Date [address] [facility] [address] ILEOSTOMY FOR ULCERATIVE COLITIS  
 HX UROLOGICAL  MO ABDOMEN SURGERY PROC UNLISTED    
 ileostomy  MO CABG, ARTERY-VEIN, FOUR   AND STENTS X 2 PRIOR TO CABG  VASCULAR SURGERY PROCEDURE UNLIST CABG Family History:  
Family History Problem Relation Age of Onset  Heart Disease Father  Heart Disease Mother  Other Daughter Social History:  
Social History Tobacco Use  Smoking status: Former Smoker Years: 15.00 Quit date: 2000 Years since quittin.9  Smokeless tobacco: Never Used Substance Use Topics  Alcohol use: Yes Alcohol/week: 11.7 standard drinks Types: 14 Glasses of wine per week Comment: WINE or cocktail 2/night ALLERGIES:  
Allergies Allergen Reactions  Levaquin [Levofloxacin] Other (comments) Acute renal failure. Patient Medications Current Facility-Administered Medications Medication Dose Route Frequency  dextrose 5 % - 0.45% NaCl infusion  75 mL/hr IntraVENous CONTINUOUS  
 sodium bicarbonate tablet 650 mg  650 mg Oral TID  sodium chloride (NS) flush 5-40 mL  5-40 mL IntraVENous Q8H  
 sodium chloride (NS) flush 5-40 mL  5-40 mL IntraVENous PRN  
 acetaminophen (TYLENOL) tablet 650 mg  650 mg Oral Q6H PRN Or  
 acetaminophen (TYLENOL) suppository 650 mg  650 mg Rectal Q6H PRN  polyethylene glycol (MIRALAX) packet 17 g  17 g Oral DAILY PRN  
 bisacodyL (DULCOLAX) suppository 10 mg  10 mg Rectal DAILY PRN  promethazine (PHENERGAN) tablet 25 mg  25 mg Oral Q6H PRN Or  
 ondansetron (ZOFRAN) injection 4 mg  4 mg IntraVENous Q6H PRN  
 famotidine (PEPCID) tablet 20 mg  20 mg Oral BID PRN  
 heparin (porcine) injection 5,000 Units  5,000 Units SubCUTAneous Q8H  
 aspirin chewable tablet 81 mg  81 mg Oral DAILY  atorvastatin (LIPITOR) tablet 40 mg  40 mg Oral QHS  buPROPion (WELLBUTRIN) tablet 100 mg  100 mg Oral DAILY  pantoprazole (PROTONIX) tablet 40 mg  40 mg Oral DAILY  hydrocortisone (CORTEF) tablet 10 mg  10 mg Oral TID  fludrocortisone (FLORINEF) tablet 0.2 mg  0.2 mg Oral BID  albumin human 25% (BUMINATE) solution 12.5 g  12.5 g IntraVENous Q6H  
 metoprolol tartrate (LOPRESSOR) tablet 12.5 mg  12.5 mg Oral BID  metoprolol (LOPRESSOR) injection 2.5 mg  2.5 mg IntraVENous Q4H PRN Review of Systems:  A comprehensive review of systems was negative except for that written in the HPI. Physical Exam:  
  
General: NAD, Alert, Oriented x 3 Mental Status: Appropriate Psych: Normal Affect, Normal Mood HEENT: Normal Cephalic/Atraumatic, PERRL Lungs: Respirations even and unlabored, Breath Sounds were clear, no respiratory distress Heart: Regular Rate and Rhythm Vascular: Distal pulses intact, good capillary refill Skin:  Multiple ulcerated wounds to both lower extremities Musculoskeletal: ROM of the right knee 0-45 degrees with pain on ROM. Redness of the right patella. Tenderness on palpation of the patella. Ulcerated wounds over the patella. Lymphatic: No lympahdenopathy, No distal edema Neuro: No gross deficits Abdomen: Soft, Non tender, No distension VITALS:  
Patient Vitals for the past 8 hrs: 
 BP Temp Pulse Resp SpO2  
21 0804   99    
21 0753 (!) 123/52 96.8 °F (36 °C) 60 17 95 % 21 0512 (!) 110/47      
21 0407   99    
21 0400 (!) 99/40 97.6 °F (36.4 °C) (!) 54 16 97 %  
 , Temp (24hrs), Av.7 °F (36.5 °C), Min:96.8 °F (36 °C), Max:98.2 °F (36.8 °C) Diagnosis Patient Active Problem List  
Diagnosis Code  Acute renal failure (HCC) N17.9  Syncope R55  Orthostatic hypotension I95.1  History of ileostomy Z98.890  
 Dehydration E86.0  Anorexia R63.0  Weakness generalized R53.1  Fatigue R53.83  
 Malaise R53.81  
 S/P CABG x 4 Z95.1  Hypertension I10  
 History of ulcerative colitis Z87.19  
 Hypomagnesemia E83.42  
 Atherosclerosis of native coronary artery of native heart without angina pectoris I25.10  Adrenal insufficiency due to cancer therapy ([address] E27.3  LV dysfunction I51.9  Abnormal cardiovascular stress test R94.39  
 Chronic renal failure, stage 4 (severe) ([facility]) N18.4  Anemia D64.9  
 Ischemic cardiomyopathy I25.5  ARACELIS (acute kidney injury) ([facility] [address] N17.9  Atrial fibrillation ([facility]) I48.91  
 Pulmonary interstitial fibrosis ([facility] J84.10  PAF (paroxysmal atrial fibrillation) ([facility]) I48.0 Assessment Right prepatellar septic bursitis Medical Decision Making:  
 
X-rays and chart have been reviewed. The patient was discussed with Dr. Velvet Quintanilla. The patient appears to have a right septic prepatellar bursa. Aspirate attempted today that was unsuccessful. Will tentatively plan for I & D tomorrow AM. Will need to hold blood thinners. Wound care consult postop for lower leg wounds RENATE Hair 
4/1/2021,  9:47 AM

## 2021-04-01 NOTE — PROGRESS NOTES
[facility] Service History and Physical 
 
Patient ID: Gil Betancourt 
male 1940 
[id number] Admission Date: 3/31/2021 Chief Complaint: SOB, PACHECO Reason for Admission: ARACELIS on CKD ASSESSMENT & PLAN: 
 
#ARACELIS on CKD4 
- baseline Cr ~1.6, Cr on admission 5.16 
- likely cardiorenal, but patient not overtly hypervolemic 
- UA unremarkable - Urine Cr and Na ordered 
- RASTA unremarkable - euvolemic/hypovolemic on exam, although NT pro-BNP 19k, no previous BNP to compare - s/p 500cc bolus, will try Albumin + low dose Lasix overnight 
- cont sodium bicarb  
- consult nephrology for AM 
 
April 1: Creatinine improving with gentle hydration. Nephrology on board. 
-Fluid adjusted based on electrolyte abnormality. Potassium repleted. #Afib 
- HR  in Afib, EKG with Afib 
- trop 767->718 likely due to Atrial Fibrillation  
- trial low dose lopressor BID 
- keep K>4, Mg>2 - check Mg, replace with 20meq KCl  
- not on Eliquis due to fall risk - Lopressor IV prn for RVR 
- consult cardiology April 1: Cardiology on board. Already of Eliquis after discussion with cardiologist about risk and benefit. Continue AV trina blocking agent as needed. #Right knee cellulitis/inflammation - WBC 12.9, no clear source of infection - R knee XR with mild CPPD suspected - UA and CXR unremarkable - possibly related to pseudogout R knee vs steroid administration as below  
- not candidate for colchicine or NSAIDs due to ARACELIS on CKD 
- consider orthopedic surgery consult for injection April 1: Cannot rule out cellulitis and infection. Significant erythema with elevated procalcitonin. Will start patient on ceftriaxone and doxy. Patient also evaluated by Ortho and suspicious for prepatellar bursitis. #Adrenal Insufficiency  
- cont with home hydrocortisone, fludrocortisone #HFrEF 
- EF 25% on most recent ECHO 
- hypotension and orthostasis limiting HF medications 
- try on low dose lopressor BID for rate control, hold for SBP < 100 April 1: Appreciate cardiology recommendations. #CAD s/p CABG 
- cont ASA, statin 
- trial Lopressor BID April 1: Appreciate cardiology recommendation. #Acute on Chronic Anemia - , check B12/folate 
- Hgb 10, baseline 12-13 
- no obvious blood loss 
- transfuse for Hgb<8 in setting of CAD April 1: Hemoglobin 8.8. Anticipate further drop given patient looks slightly dehydrated on examination. -B12 level and folate level not low. We will check iron studies and TSH with next blood work-up. #GERD - cont PPI Disposition: admit to med-surg for evaluation and treatment of above. Further dispo pending clinical course. Diet: renal  
VTE ppx: SCDs given possible I&D tomorrow. GI ppx: PPI Patient's daughter Arvil Phlegm [[id number]] and wife was at bedside. Extensively discussed goals of care. DNR status verified. Patient and family does not want to get patient intubated even only in case of respiratory failure. So patient is DNR and DNI. Plan of care extensively discussed with the family. Surrogate decision-maker: wife, at bedside, Tj Ferguson, [phone] HISTORY OF PRESENT ILLNESS: 
Patient is a 78 y.o. 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 intake and extreme weakness. He was found to have acute on chronic kidney injury and right knee cellulitis/deep tissue infection. April 1, 2021: Patient was evaluated by cardiology, nephrology and orthopedic surgeon. Patient is AO x3 with cues. Daughter and wife present bedside and contributed to the history. Patient major complaint is pain. Denies any other complaint.  
 
Rest review of system negative except mentioned above. REVIEW OF SYSTEMS: 
A 14 point review of systems was taken and pertinent positive as per HPI. PHYSICAL EXAM: 
 
Visit Vitals BP (!) 115/55 (BP 1 Location: Left upper arm, BP Patient Position: At rest) Pulse 77 Temp 97.3 °F (36.3 °C) Resp 18 Ht 6' 3\" (1.905 m) Wt 77.1 kg (170 lb) SpO2 99% BMI 21.25 kg/m² General: No acute distress, speaking in full sentences, no use of accessory muscles HEENT: Pupils equal.  Extraocular muscle intact. Neck: Supple, no JVD Lungs: Bilateral lower lobe crackles otherwise clear to auscultate bilaterally Cardiovascular: Irregularly irregular with normal S1 and S2 Abdomen: Soft, nontender, nondistended, normoactive bowel sounds Extremities: No cyanosis clubbing. 1+ Edema RLE>LLE. Right knee erythema of the skin concerning for cellulitis or may be deep tissue infection. Neuro: Nonfocal, A&O x3  
Psych: Mild cognitive impairment noted. Procedures done this admission: 
Procedure(s): RIGHT KNEE INCISION AND DRAINAGE/ CHOICE ANES ROOM 611 All Micro Results Procedure Component Value Units Date/Time CULTURE, BLOOD [[id number]] Collected: 03/31/21 2135 Order Status: Completed Specimen: Blood Updated: 04/01/21 5015 Special Requests: --     
  RIGHT 
HAND Culture result: NO GROWTH AFTER 9 HOURS     
  
 
 
SARS-CoV-2 Lab Results \"Novel Coronavirus\" Test: No results found for: COV2NT \"Emergent Disease\" Test: No results found for: EDPR \"SARS-COV-2\" Test: No results found for: XGCOVT \"Precision Labs\" Test: No results found for: RSLT Rapid Test: No results found for: COVR Labs: Results:  
   
BMP, Mg, Phos Recent Labs 04/01/21 
9282 03/31/21 
1514 * 143  
K 3.1* 3.5 * 116* CO2 13* 15* AGAP 13 12 BUN 77* 80* CREA 4.83* 5.16* CA 7.9* 8.4 GLU 76 83 MG  --  1.9 PHOS  --  5.8* CBC Recent Labs 04/01/21 
7973 03/31/21 
1514 WBC 12.4* 12.9*  
RBC 2.62* 2.93* HGB 8.8* 10.0* HCT 27.2* 31.3*  
* 117* GRANS 90* 88* LYMPH 2* 2*  
EOS 2 2 MONOS 4 6  
BASOS 0 0 IG 2 2 ANEU 11.1* 11.3* ABL 0.2* 0.3* MARIA R 0.3 0.3 ABM 0.5 0.7 ABB 0.0 0.0 AIG 0.2 0.3 LFT Recent Labs  
  03/31/21 
1514 ALT 37 AP 76  
TP 5.8* ALB 2.5*  
GLOB 3.3 AGRAT 0.8* Cardiac Testing Lab Results Component Value Date/Time CK 58 10/20/2014 07:12 PM  
 Troponin-I, Qt. <0.02 (L) 10/20/2014 07:12 PM  
  
Coagulation Tests Lab Results Component Value Date/Time Prothrombin time 10.5 05/04/2015 11:03 AM  
 INR 1.0 05/04/2015 11:03 AM  
  
A1c No results found for: HBA1C, HGBE8, EHA1SPUU Lipid Panel No results found for: CHOL, CHOLPOCT, CHOLX, CHLST, CHOLV, 422645, HDL, HDLP, LDL, LDLC, DLDLP, [id number], VLDLC, VLDL, TGLX, TRIGL, TRIGP, TGLPOCT, CHHD, CHHDX Thyroid Panel No results found for: TSH, T4, FT4, TT3, T3U, TSHEXT Most Recent UA Lab Results Component Value Date/Time Color YELLOW 04/01/2021 10:00 AM  
 Appearance CLEAR 04/01/2021 10:00 AM  
 Specific gravity 1.017 10/20/2014 08:20 PM  
 pH (UA) 5.5 04/01/2021 10:00 AM  
 Protein Negative 04/01/2021 10:00 AM  
 Glucose Negative 04/01/2021 10:00 AM  
 Ketone Negative 04/01/2021 10:00 AM  
 Bilirubin Negative 04/01/2021 10:00 AM  
 Blood Negative 04/01/2021 10:00 AM  
 Urobilinogen 0.2 04/01/2021 10:00 AM  
 Nitrites Negative 04/01/2021 10:00 AM  
 Leukocyte Esterase Negative 04/01/2021 10:00 AM  
 WBC 0-3 03/31/2021 05:55 PM  
 RBC 10-20 03/31/2021 05:55 PM  
 Epithelial cells 0-3 03/31/2021 05:55 PM  
 Bacteria 0 03/31/2021 05:55 PM  
 Casts 0-3 03/31/2021 05:55 PM  
  
 
 
 
Active Problems: 
 
Patient Active Problem List  
 Diagnosis Date Noted  Pulmonary interstitial fibrosis ([facility] [place] [facility] 04/01/2021  PAF (paroxysmal atrial fibrillation) ([facility] [place] 04/01/2021  ARACELIS (acute kidney injury) ([facility] [place] [facility] 03/31/2021  Atrial fibrillation ([facility] [place] [facility] 03/31/2021  Chronic renal failure, stage 4 (severe) ([facility]) 11/12/2020  Anemia 11/12/2020  Ischemic cardiomyopathy 11/12/2020  Abnormal cardiovascular stress test 02/27/2020  LV dysfunction 02/03/2020  Atherosclerosis of native coronary artery of native heart without angina pectoris 12/26/2019  Adrenal insufficiency due to cancer therapy ([facility] 12/26/2019  Hypomagnesemia 10/22/2014  Syncope 10/21/2014  Orthostatic hypotension 10/21/2014  History of ileostomy 10/21/2014  Dehydration 10/21/2014  Anorexia 10/21/2014  Weakness generalized 10/21/2014  Fatigue 10/21/2014  Malaise 10/21/2014  S/P CABG x 4 10/21/2014  Hypertension 10/21/2014  History of ulcerative colitis 10/21/2014  Acute renal failure ([facility] 10/20/2014 Orlando Newton MD 
[address] [place], [place] [address]ist Service 4/1/2021 6:52 PM

## 2021-04-01 NOTE — PROGRESS NOTES
CM spoke with patient/spouse at bedside. Demographics verified. Patient lives with spouse in one level home; no steps to enter. Patient is independent with ADLs at baseline; ambulates with walker. No home oxygen or other DME. Patient current with [facility] for RN/PT. CM notified [facility] of hospital admission; will resume services at discharge if appropriate. PCP is Dr. Amarsi Lopez; uses [facility] on [address]. Undetermined discharge needs at this time. CM will follow to assist with discharge plans. Care Management Interventions PCP Verified by CM: Yes(Dr. Amaris Lopez) Mode of Transport at Discharge: Self Transition of Care Consult (CM Consult): Discharge Planning Current Support Network: Own Home, Lives with Spouse Confirm Follow Up Transport: Family Discharge Location Discharge Placement: Unable to determine at this time

## 2021-04-01 NOTE — WOUND CARE
Left arm has healing wounds from trauma, PolyMem in use, not on formulary will use foam dressing every other day while in acute care and return to home cares at discharge. Right knee with scabs and edema, knee is being evaluated by ortho will defer to them for knee cares, available to assist please call. Right buttock with 1x1x0.1cm pink open areas and blanchable erythema on each buttock in 3x4cm area, recommend zinc based barrier cream bid and prn. Will monitor.

## 2021-04-02 NOTE — PROGRESS NOTES
Physical Therapy Note: 
 
Patient off of the floor for surgery. Not see for PT treatment today. Will re-assess post operatively per orthopedic surgery. Will follow. Thank you, Zafar Salazar, PT, DPT 
4/2/2021

## 2021-04-02 NOTE — PROGRESS NOTES
TRANSFER - IN REPORT: 
 
Verbal report received from [facility] on Saleem Holden  being received from PACU for routine post - op Report consisted of patients Situation, Background, Assessment and  
Recommendations(SBAR). Information from the following report(s) SBAR, Kardex and Accordion was reviewed with the receiving nurse. Opportunity for questions and clarification was provided. Assessment completed upon patients arrival to unit and care assumed.

## 2021-04-02 NOTE — PROGRESS NOTES
Problem: Falls - Risk of 
Goal: *Absence of Falls Description: Document Authur Senters Fall Risk and appropriate interventions in the flowsheet. Outcome: Progressing Towards Goal 
Note: Fall Risk Interventions: 
Mobility Interventions: Communicate number of staff needed for ambulation/transfer, OT consult for ADLs, PT Consult for mobility concerns Mentation Interventions: Adequate sleep, hydration, pain control, Bed/chair exit alarm, Eyeglasses and hearing aids, Family/sitter at bedside, More frequent rounding, Toileting rounds, Reorient patient Medication Interventions: Bed/chair exit alarm, Evaluate medications/consider consulting pharmacy Elimination Interventions: Bed/chair exit alarm, Call light in reach, Stay With Me (per policy), Patient to call for help with toileting needs History of Falls Interventions: Door open when patient unattended, Bed/chair exit alarm, Evaluate medications/consider consulting pharmacy Problem: Patient Education: Go to Patient Education Activity Goal: Patient/Family Education Outcome: Progressing Towards Goal 
  
Problem: Pressure Injury - Risk of 
Goal: *Prevention of pressure injury Description: Document Jay Scale and appropriate interventions in the flowsheet. Outcome: Progressing Towards Goal 
Note: Pressure Injury Interventions: 
Sensory Interventions: Assess changes in LOC, Assess need for specialty bed, Avoid rigorous massage over bony prominences, Keep linens dry and wrinkle-free, Minimize linen layers, Monitor skin under medical devices, Pressure redistribution bed/mattress (bed type) Activity Interventions: Assess need for specialty bed, Increase time out of bed, Pressure redistribution bed/mattress(bed type) Mobility Interventions: HOB 30 degrees or less, Assess need for specialty bed, Pressure redistribution bed/mattress (bed type) Nutrition Interventions: Document food/fluid/supplement intake, Offer support with meals,snacks and hydration Friction and Shear Interventions: Lift sheet Problem: Patient Education: Go to Patient Education Activity Goal: Patient/Family Education Outcome: Progressing Towards Goal 
  
Problem: Patient Education: Go to Patient Education Activity Goal: Patient/Family Education Outcome: Progressing Towards Goal 
  
Problem: Acute Renal Failure: Day 3 Goal: Activity/Safety Outcome: Progressing Towards Goal 
Goal: Consults, if ordered Outcome: Progressing Towards Goal 
Goal: Diagnostic Test/Procedures Outcome: Progressing Towards Goal 
Goal: Nutrition/Diet Outcome: Progressing Towards Goal 
Goal: Discharge Planning Outcome: Progressing Towards Goal 
Goal: Medications Outcome: Progressing Towards Goal 
Goal: Respiratory Outcome: Progressing Towards Goal 
Goal: Treatments/Interventions/Procedures Outcome: Progressing Towards Goal 
Goal: Psychosocial 
Outcome: Progressing Towards Goal 
Goal: *Optimal pain control at patient's stated goal 
Outcome: Progressing Towards Goal 
Goal: *Urinary output within identified parameters Outcome: Progressing Towards Goal 
Goal: *Hemodynamically stable Outcome: Progressing Towards Goal 
Goal: *Tolerating diet Outcome: Progressing Towards Goal 
Goal: *Lab values improving Outcome: Progressing Towards Goal

## 2021-04-02 NOTE — PROGRESS NOTES
At the time of his surgical intervention he did have a significant mount of purulence in the prepatellar bursa area of the right knee. There was some iodoform packing placed into this. The packing can be removed in 48 hours. He may have significant drainage from this area and his outer dressing can be changed on the right knee daily and as needed with 4 x 4 gauze and Kerlix. As far as his right elbow is concerned as I aspirated this he developed a small area of spontaneous drainage so this will likely drain quite a bit and he can have the outer dressing changes with 4 x 4 gauze and Kerlix as needed as this may drain quite a bit. Hopefully now that both his prepatellar as well as olecranon bursa have been drained the antibiotics can help resolve these infections

## 2021-04-02 NOTE — PROGRESS NOTES
[facility] CARDIOLOGY PROGRESS NOTE 
 
4/2/2021 1:23 PM 
 
Admit Date: 3/31/2021 Subjective:  
Stable overnight without angina, CHF, or palpitations. Vitals stable and controlled. No other complaints overnight. Tolerating meds well. Tolerating gentle IVF's and creatinine improving slowly, recheck in AM, continue fluids as tolerated for now. Knee I&D today Objective:  
  
Vitals:  
 04/02/21 1120 04/02/21 1125 04/02/21 1135 04/02/21 1246 BP: (!) 144/63 (!) 147/65  (!) [id number] Pulse: 63 65 67 67 Resp: 14 16 16 16 Temp:   98.2 °F (36.8 °C) 97.6 °F (36.4 °C) SpO2: 100%  100% 100% Weight:      
Height:      
 
 
Physical Exam: 
Neck- supple, no JVD at 45 deg CV- regular rate and rhythm no MRG Lung- clear bilaterally apically, faint fibrotic bibasilar crackles Abd- soft, nontender, nondistended Ext- no edema, knee edema right Skin- warm and dry Data Review:  
Recent Labs 04/02/21 
7756 04/02/21 
4365 04/01/21 
9518 03/31/21 
1514 NA  --  145 146* 143  
K 3.1* 2.5* 3.1* 3.5 MG  --  1.6*  --  1.9 BUN  --  71* 77* 80* CREA  --  4.18* 4.83* 5.16* GLU  --  160* 76 83 WBC  --  13.0* 12.4* 12.9* HGB  --  8.3* 8.8* 10.0* HCT  --  26.2* 27.2* 31.3*  
PLT  --  95* 101* 117* Assessment and Plan:  
 
ARACELIS (acute kidney injury) ([facility] (3/31/2021)- per nephrology, does not appear volume overloaded at this time, creatinine slowly improving with gentle fluids. Continue as tolerated, recheck basic metabolic panel in the morning.   Avoid hypotension and nephrotoxins. 
  
Orthostatic hypotension (10/21/2014)- monitor on lopressor 
  
PAF- current NSR on lopressor, 2% a fib burden monitor 2-2021, eliquis on hold due to falls-continue to follow telemetry 
  
CAD S/P CABG x 4- s/p CABG in 2006 w [facility] 3/2020 w 4/4 bypass grafts patent (LIMA to LAD, SVG to OM1, SVG to OM2, SVG to PDA)- ASA, statin, BB 
     
ICM-  EF 24%, mild MR/TR, does not appear volume overloaded, cont BB, no ACE/ARB due to orthostatic hypotension, continue gentle IV fluids today and recheck creatinine again tomorrow. 
  
Anemia- monitor -recheck in a.m., transfuse as needed 
  
Pulmonary interstitial fibrosis ([facility] [address] [address] (4/1/2021)- suspect this may be primary cause of dyspnea, consider pulmonary consult GAURAV Lopez MD 
[address] Cardiology Pager [phone]

## 2021-04-02 NOTE — PROGRESS NOTES
[facility] Service History and Physical 
 
Patient ID: Raquel Bloom 
male 1940 
[id number] Admission Date: 3/31/2021 Chief Complaint: SOB, PACHECO Reason for Admission: ARACELIS on CKD ASSESSMENT & PLAN: 
 
#ARACELIS on CKD4 
- baseline Cr ~1.6, Cr on admission 5.16 
- likely cardiorenal, but patient not overtly hypervolemic 
- UA unremarkable - Urine Cr and Na ordered 
- RASTA unremarkable - euvolemic/hypovolemic on exam, although NT pro-BNP 19k, no previous BNP to compare - s/p 500cc bolus, will try Albumin + low dose Lasix overnight 
- cont sodium bicarb  
- consult nephrology for AM 
 
April 1: Creatinine improving with gentle hydration. Nephrology on board. 
-Fluid adjusted based on electrolyte abnormality. Potassium repleted. April 2: Creatinine improving with fluids. Nephrology on board. Potassium repleted. Recheck of BMP ordered. - 
#Afib 
- HR  in Afib, EKG with Afib 
- trop 767->718 likely due to Atrial Fibrillation  
- trial low dose lopressor BID 
- keep K>4, Mg>2 - check Mg, replace with 20meq KCl  
- not on Eliquis due to fall risk - Lopressor IV prn for RVR 
- consult cardiology April 1: Cardiology on board. Already of Eliquis after discussion with cardiologist about risk and benefit. Continue AV trina blocking agent as needed. #Right knee cellulitis/inflammation - WBC 12.9, no clear source of infection - R knee XR with mild CPPD suspected - UA and CXR unremarkable - possibly related to pseudogout R knee vs steroid administration as below  
- not candidate for colchicine or NSAIDs due to ARACLEIS on CKD 
- consider orthopedic surgery consult for injection April 1: Cannot rule out cellulitis and infection. Significant erythema with elevated procalcitonin. Will start patient on ceftriaxone and doxy. Patient also evaluated by Ortho and suspicious for prepatellar bursitis. April 2: I&D of prepatellar bursa showed significant amount of purulence.   Infectious disease consulted. #Adrenal Insufficiency  
- cont with home hydrocortisone, fludrocortisone #HFrEF 
- EF 25% on most recent ECHO 
- hypotension and orthostasis limiting HF medications 
- try on low dose lopressor BID for rate control, hold for SBP < 100 April 1: Appreciate cardiology recommendations. #CAD s/p CABG 
- cont ASA, statin 
- trial Lopressor BID April 1: Appreciate cardiology recommendation. #Acute on Chronic Anemia - , check B12/folate 
- Hgb 10, baseline 12-13 
- no obvious blood loss 
- transfuse for Hgb<8 in setting of CAD April 1: Hemoglobin 8.8. Anticipate further drop given patient looks slightly dehydrated on examination. -B12 level and folate level not low. We will check iron studies and TSH with next blood work-up. #GERD - cont PPI #Sacral ulcer: Examination deferred patient was evaluated in preop area. Wound consult placed. Diet: renal  
VTE ppx: SCDs given possible I&D tomorrow. GI ppx: PPI Patient's daughter Samy Smart [[id number]] and wife was at bedside. Extensively discussed goals of care. DNR status verified. Patient and family does not want to get patient intubated even only in case of respiratory failure. So patient is DNR and DNI. April 2: Patient's daughter was present at bedside. Extensively discussed the plan with patient's daughter. She has already discussed hospice with patient's wife. In next 24 to 48 hours, we will have goals of care discussion again based on clinical response and overall progress. Daughter understands that patient condition may deteriorate in spite of treatment and overall patient has multiple chronic life-threatening problems.  
 
 
 
HISTORY OF PRESENT ILLNESS: 
Patient is a 78 y.o. 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 intake and extreme weakness. He was found to have acute on chronic kidney injury and right knee cellulitis/deep tissue infection. April 1, 2021: Patient was evaluated by cardiology, nephrology and orthopedic surgeon. Patient is AO x3 with cues. Daughter and wife present bedside and contributed to the history. April 2: Status post I&D with drainage of purulence from right knee. Also right elbow was aspirated which has known hematoma and x-ray negative for fractures per family. Patient had delirium overnight with some anxiety. Which improved with hydroxyzine. Patient is slightly confused. Able to answer orientation questions with cues. Was seen in preop area. Rest review of system negative except mentioned above. PHYSICAL EXAM: 
 
Visit Vitals BP (!) 98/51 (BP 1 Location: Left upper arm, BP Patient Position: At rest) Pulse 67 Temp 97.6 °F (36.4 °C) Resp 16 Ht 6' 3\" (1.905 m) Wt 77.1 kg (170 lb) SpO2 100% BMI 21.25 kg/m² General: No acute distress, speaking in full sentences, no use of accessory muscles HEENT: Pupils equal.  Extraocular muscle intact. Neck: Supple, no JVD Lungs: Bilateral lower lobe crackles otherwise clear to auscultate bilaterally Cardiovascular: Irregularly irregular with normal S1 and S2 Abdomen: Soft, nontender, nondistended, normoactive bowel sounds Extremities: No cyanosis clubbing. 1+ Edema RLE>LLE. Right knee erythema of the skin concerning for cellulitis or may be deep tissue infection. Neuro: Nonfocal, A&O x3 with cues. Psych: Mild cognitive impairment noted. Skin: Mild swelling of right elbow which is stable from last few weeks after fall and patient has known hematoma per family. Procedures done this admission: 
Procedure(s): RIGHT KNEE INCISION AND DRAINAGE/RIGHT ELBOW WOUND FLUID ASPIRATION All Micro Results Procedure Component Value Units Date/Time CULTURE, Ligia Prasad STAIN [[id number]] Order Status: Sent Specimen: Wound from Knee CULTURE, Ligia Prasad STAIN [[id number]] Order Status: Sent Specimen: Wound from Elbow CULTURE, ANAEROBIC [[id number]] Order Status: Sent Specimen: Surgical Specimen CULTURE, BLOOD [[id number]] Collected: 03/31/21 2135 Order Status: Completed Specimen: Blood Updated: 04/02/21 0928 Special Requests: --     
  RIGHT 
HAND Culture result: NO GROWTH 2 DAYS     
  
 
 
SARS-CoV-2 Lab Results \"Novel Coronavirus\" Test: No results found for: COV2NT \"Emergent Disease\" Test: No results found for: EDPR \"SARS-COV-2\" Test: No results found for: XGCOVT \"Precision Labs\" Test: No results found for: RSLT Rapid Test: No results found for: COVR Labs: Results:  
   
BMP, Mg, Phos Recent Labs 04/02/21 
1742 04/02/21 
6334 04/01/21 
2312 03/31/21 
1514 NA  --  145 146* 143  
K 3.1* 2.5* 3.1* 3.5  
CL  --  118* 120* 116* CO2  --  15* 13* 15* AGAP  --  12 13 12 BUN  --  71* 77* 80* CREA  --  4.18* 4.83* 5.16* CA  --  7.7* 7.9* 8.4 GLU  --  160* 76 83 MG  --  1.6*  --  1.9 PHOS  --   --   --  5.8* CBC Recent Labs 04/02/21 
9835 04/01/21 
7295 03/31/21 
1514 WBC 13.0* 12.4* 12.9*  
RBC 2.47* 2.62* 2.93* HGB 8.3* 8.8* 10.0* HCT 26.2* 27.2* 31.3*  
PLT 95* 101* 117* GRANS 90* 90* 88* LYMPH 2* 2* 2*  
EOS 2 2 2 MONOS 5 4 6 BASOS 0 0 0 IG 2 2 2 ANEU 11.7* 11.1* 11.3* ABL 0.3* 0.2* 0.3* MARIA R 0.2 0.3 0.3 ABM 0.6 0.5 0.7 ABB 0.0 0.0 0.0 AIG 0.3 0.2 0.3 LFT Recent Labs  
  03/31/21 
1514 ALT 37 AP 76  
TP 5.8* ALB 2.5*  
GLOB 3.3 AGRAT 0.8* Cardiac Testing Lab Results Component Value Date/Time CK 58 10/20/2014 07:12 PM  
 Troponin-I, Qt. <0.02 (L) 10/20/2014 07:12 PM  
  
Coagulation Tests Lab Results Component Value Date/Time  Prothrombin time 10.5 05/04/2015 11:03 AM  
 INR 1.0 05/04/2015 11:03 AM  
  
A1c No results found for: HBA1C, HGBE8, JSI7SLJI, PNO5VDKT Lipid Panel No results found for: CHOL, CHOLPOCT, CHOLX, CHLST, CHOLV, 600317, HDL, HDLP, LDL, LDLC, DLDLP, 663279, VLDLC, VLDL, TGLX, TRIGL, TRIGP, TGLPOCT, CHHD, CHHDX Thyroid Panel Lab Results Component Value Date/Time TSH 0.741 04/02/2021 05:35 AM  
 T4, Free 0.8 04/02/2021 05:35 AM  
    
Most Recent UA Lab Results Component Value Date/Time Color YELLOW 04/01/2021 10:00 AM  
 Appearance CLEAR 04/01/2021 10:00 AM  
 Specific gravity 1.017 10/20/2014 08:20 PM  
 pH (UA) 5.5 04/01/2021 10:00 AM  
 Protein Negative 04/01/2021 10:00 AM  
 Glucose Negative 04/01/2021 10:00 AM  
 Ketone Negative 04/01/2021 10:00 AM  
 Bilirubin Negative 04/01/2021 10:00 AM  
 Blood Negative 04/01/2021 10:00 AM  
 Urobilinogen 0.2 04/01/2021 10:00 AM  
 Nitrites Negative 04/01/2021 10:00 AM  
 Leukocyte Esterase Negative 04/01/2021 10:00 AM  
 WBC 0-3 03/31/2021 05:55 PM  
 RBC 10-20 03/31/2021 05:55 PM  
 Epithelial cells 0-3 03/31/2021 05:55 PM  
 Bacteria 0 03/31/2021 05:55 PM  
 Casts 0-3 03/31/2021 05:55 PM  
  
 
 
 
Active Problems: 
 
Patient Active Problem List  
 Diagnosis Date Noted  Pulmonary interstitial fibrosis ([facility] [address] 04/01/2021  PAF (paroxysmal atrial fibrillation) ([facility] [address] 04/01/2021  ARACELIS (acute kidney injury) ([facility] [address] 03/31/2021  Atrial fibrillation ([facility] [address] 03/31/2021  Chronic renal failure, stage 4 (severe) ([facility] [address] 11/12/2020  Anemia 11/12/2020  Ischemic cardiomyopathy 11/12/2020  Abnormal cardiovascular stress test 02/27/2020  LV dysfunction 02/03/2020  Atherosclerosis of native coronary artery of native heart without angina pectoris 12/26/2019  Adrenal insufficiency due to cancer therapy ([facility] [address] 12/26/2019  Hypomagnesemia 10/22/2014  Syncope 10/21/2014  Orthostatic hypotension 10/21/2014  History of ileostomy 10/21/2014  Dehydration 10/21/2014  Anorexia 10/21/2014  Weakness generalized 10/21/2014  Fatigue 10/21/2014  Malaise 10/21/2014  S/P CABG x 4 10/21/2014  Hypertension 10/21/2014  History of ulcerative colitis 10/21/2014  Acute renal failure ([facility] [address] 10/20/2014 Marjorie Persaud MD 
[address] Hospitalist Service 4/2/2021 6:52 PM

## 2021-04-02 NOTE — ANESTHESIA PREPROCEDURE EVALUATION
Relevant Problems No relevant active problems Anesthetic History No history of anesthetic complications Review of Systems / Medical History Patient summary reviewed, nursing notes reviewed and pertinent labs reviewed Pulmonary Shortness of breath Comments: Interstitial Pulmonary fibrosis Neuro/Psych Psychiatric history Cardiovascular Hypertension: well controlled Dysrhythmias : atrial fibrillation CAD, cardiac stents and CABG Exercise tolerance: <4 METS Comments: TTE Feb 2021- EF 25%, RV dysfunction, Mod MR  
GI/Hepatic/Renal 
  
 
 
Renal disease: ARF and CRI 
PUD Endo/Other Arthritis and anemia (hgb 8.8) Comments: Adrenal insufficiency Other Findings Physical Exam 
 
Airway Mallampati: II 
 
 
Mouth opening: Normal 
 
 Cardiovascular Dental 
No notable dental hx Pulmonary Abdominal 
 
 
 
 Other Findings Anesthetic Plan ASA: 4 Anesthesia type: spinal 
 
 
 
 
 
Anesthetic plan and risks discussed with: Patient and Son / Daughter Discussed both GA and SAB as options- with patient and his daughter, who is a medical malpractice

## 2021-04-02 NOTE — PROGRESS NOTES
Patient not seen for OT this morning due to being off floor for surgery. Will check back at a later time.   
Simeon León, OTR/L

## 2021-04-02 NOTE — OP NOTES
Operative Report Patient: Alee Andrade MRN: [id number]  SSN: [id number] YOB: 1940  Age: 78 y.o. Sex: male Date of Surgery: 4/2/2021 History:  Alee Andrade is a 78 y.o. male who we were consulted for as he was having significant pain in both his right knee and then as his daughter pointed out the morning of surgery in his right elbow as well. We had tried to aspirate his right knee and really failed to get any purulence but did appear to be consistent with a septic prepatellar bursitis of his right knee. His right elbow also had some element of olecranon bursitis and it was difficult to tell if it was sort of more inflammatory or more suppurative. I did talk to his daughter about the fact that only will be washout the prepatellar bursa of his right knee but we would also aspirate his right elbow and see if there was any fluid in there that we could aspirate hopefully with a combination of aspiration and treating with his antibiotics this would resolve. So I talked to her about both of these things she seemed comfortable proceeding. I talked to the patient and/or their representative and explained the exact nature the procedure. I also went through a detailed list of the material risks associated with  the procedure which included risk of bleeding, infection, injury to nearby structures, worsening the situation, as well as the risks associate with anesthesia and finally death. Also talked with him regarding the benefits and alternatives to the procedure. Preoperative Diagnosis: Septic prepatellar bursitis of right knee [M71.161] Postoperative Diagnosis: 1septic prepatellar bursitis of right knee #2suppurative olecranon bursitis right elbow Surgeon(s) and Role: * Jessy Santos MD - Primary Anesthesia: General  
 
Procedure: Procedure(s): 
#1drainage of right prepatellar bursa right knee #2aspiration of right olecranon bursa Procedure in Detail: After the successful duction of spinal anesthetic the right lower extremity was prepped and draped in usual sterile fashion. I then made a small incision laterally over the patella and then used some Metzenbaum scissors to dissect down to the area the prepatellar bursa. I did encounter some bibi purulence some of this was captured and sent for culture. I then used my scissors to free up some of the adhesions in this area and make sure there were no pockets that were still present and again we did encounter some bibi purulence in this area I then used a pulse lavage to thoroughly irrigate this area after thorough irrigation I packed approximately 48 inches of quarter inch iodoform packing into his right prepatellar bursa. I then turned my attention to the right elbow for I prepped the area over his olecranon bursa and as I was injecting this with lidocaine to help make the aspiration more comfortable he did develop a very small spontaneous wound and I was able to aspirate a small amount of fluid that we sent for culture and then through the small spontaneous wound I was able to express the remainder of the cloudy fluid from his olecranon bursa as well. After this was expressed we placed a bulky dressing over both his elbow as well as his knee I should mention that I did place 1 suture in the incision around his knee just to close some of the incision taking great care to make sure that we did not capture any of the iodoform packing with placement of the suture. The plan will be to change the packing on the right knee in approximate 48 hours and just do dressing changes over this. Both the right knee and right elbow may have significant drainage and is okay to change the outer dressing on both of these daily and as needed with 4 x 4 gauze and Kerlix Estimated Blood Loss: 60 cc Tourniquet Time: * No tourniquets in log * Implants: * No implants in log * Specimens:  
ID Type Source Tests Collected by Time Destination 1 : Right Patella Bursa Pus Wound Knee  CULTURE, ANAEROBIC, AEROBIC CULTURE + GRAM STAIN Alyson Vanessa MD 4/2/2021 1039 Microbiology 2 : Right Elbow Bursa Fluid Wound Elbow CULTURE, ANAEROBIC, CULTURE, WOUND W GRAM STAIN Alyson Vanessa MD 4/2/2021 1045 Microbiology Drains: None Complications: None Counts: Sponge and needle counts were correct times two. Signed By:  Jyotsna Canseco MD   
 April 2, 2021

## 2021-04-02 NOTE — PROGRESS NOTES
[facility] Subjective: A on CKD  Feels a little better today. Review of Systems -  
General ROS: negative for - fever, chills Respiratory ROS: no SOB, cough, PACHECO Cardiovascular ROS: no CP, palpitations Gastrointestinal ROS: no N&V, abdominal pain, diarrhea Genito-Urinary ROS: no difficulty voiding, dysuria Neurological ROS: no seizures, focal weekness Objective: 
 
Vitals:  
 04/02/21 1120 04/02/21 1125 04/02/21 1135 04/02/21 1246 BP: (!) 144/63 (!) 147/65  (!) [id number] Pulse: 63 65 67 67 Resp: 14 16 16 16 Temp:   98.2 °F (36.8 °C) 97.6 °F (36.4 °C) SpO2: 100%  100% 100% Weight:      
Height:      
 
 
PE 
Gen: in no acute distress CV:reg rate Chest:clear Abd: soft Ext/Access: no edema [facility] Recent Labs 04/02/21 
5108 04/01/21 
4058 03/31/21 
1514 WBC 13.0* 12.4* 12.9* HGB 8.3* 8.8* 10.0* HCT 26.2* 27.2* 31.3*  
PLT 95* 101* 117* Recent Labs 04/02/21 
2565 04/02/21 
8561 04/01/21 
4149 03/31/21 
1514 NA  --  145 146* 143  
K 3.1* 2.5* 3.1* 3.5  
CL  --  118* 120* 116* CO2  --  15* 13* 15* GLU  --  160* 76 83 BUN  --  71* 77* 80* CREA  --  4.18* 4.83* 5.16* MG  --  1.6*  --  1.9 PHOS  --   --   --  5.8*  
CA  --  7.7* 7.9* 8.4 ALB  --   --   --  2.5* TBILI  --   --   --  0.8 ALT  --   --   --  37 Radiology A/P:  
Patient Active Problem List  
Diagnosis Code  Acute renal failure (HCC) N17.9  Syncope R55  Orthostatic hypotension I95.1  History of ileostomy Z98.890  
 Dehydration E86.0  Anorexia R63.0  Weakness generalized R53.1  Fatigue R53.83  
 Malaise R53.81  
 S/P CABG x 4 Z95.1  Hypertension I10  
 History of ulcerative colitis Z87.19  
 Hypomagnesemia E83.42  
 Atherosclerosis of native coronary artery of native heart without angina pectoris I25.10  Adrenal insufficiency due to cancer therapy ([facility] [address] E27.3  LV dysfunction I51.9  Abnormal cardiovascular stress test R94.39  Chronic renal failure, stage 4 (severe) (HCC) N18.4  Anemia D64.9  
 Ischemic cardiomyopathy I25.5  ARACELIS (acute kidney injury) ([facility] [address] N17.9  Atrial fibrillation (HCC) I48.91  
 Pulmonary interstitial fibrosis (HCC) J84.10  PAF (paroxysmal atrial fibrillation) (HCC) I48.0 A on CKD4 - labs are a little better today. Will continue carefull hydration Pulm Fibrosis Thrombocytopenia Septic Bursitis - s/p I &D Orthostatic Hypotension Adrenal Insufficiency Carmela Weinstein MD

## 2021-04-02 NOTE — PROGRESS NOTES
Patient taken down to preop via transport. No distress noted. Fluids currently infusing. Family at bedside

## 2021-04-02 NOTE — ANESTHESIA POSTPROCEDURE EVALUATION
Procedure(s): RIGHT KNEE INCISION AND DRAINAGE/RIGHT ELBOW WOUND FLUID ASPIRATION. general, spinal 
 
Anesthesia Post Evaluation Multimodal analgesia: multimodal analgesia used between 6 hours prior to anesthesia start to PACU discharge Patient location during evaluation: bedside Patient participation: complete - patient participated Level of consciousness: awake and alert Pain management: adequate Airway patency: patent Anesthetic complications: no 
Cardiovascular status: hemodynamically stable Respiratory status: spontaneous ventilation Hydration status: euvolemic Comments: Patient stable and may discharge at this time. Post anesthesia nausea and vomiting:  none Final Post Anesthesia Temperature Assessment:  Normothermia (36.0-37.5 degrees C) Good result with spinal anesthesia, resolving normally. INITIAL Post-op Vital signs:  
Vitals Value Taken Time /65 04/02/21 1125 Temp 36.8 °C (98.2 °F) 04/02/21 1135 Pulse 67 04/02/21 1135 Resp 16 04/02/21 1135 SpO2 100 % 04/02/21 1135

## 2021-04-02 NOTE — INTERVAL H&P NOTE
Update History & Physical 
 
The Patient's History and Physical of 3/31/2021 was reviewed with the patient and I examined the patient. There was no change. The surgical site was confirmed by the patient and me. Plan:  The risk, benefits, expected outcome, and alternative to the recommended procedure have been discussed with the patient. Patient understands and wants to proceed with drainage of right prepatellar bursa.  
 
Electronically signed by Rickey Dunlap MD on 4/2/2021 at 7:22 AM

## 2021-04-02 NOTE — PROGRESS NOTES
Patient lying in bed with family at bedside. Patient complains of pain and is medicated this shift. New IV medication started this shift. All dressing clean, dry, and intact. Hourly rounding completed. Bed in low position. Belongings and call light within reach. Will give report to night shift nurse.

## 2021-04-02 NOTE — PROGRESS NOTES
Hourly rounds completed. All needs met. Pt NPO since MN. During the middle of the shift, pt daughter requested if pt could have something for anxiety/sleep. Messaged MD via perfect serve. MD placed orders. RN gave pt chg bath this am. Daughter remains at the bedside with patient. Report was given to day shift nurse and Pre-OP nurse.

## 2021-04-02 NOTE — PROGRESS NOTES
Pharmacokinetic Consult to Pharmacist 
 
Gil Betancourt is a 78 y.o. male being treated for joint infection. Height: 6' 3\" (190.5 cm)  Weight: (unable to weight pt due to lack of proper equipment) Lab Results Component Value Date/Time BUN 71 (H) 04/02/2021 05:35 AM  
 Creatinine 4.18 (H) 04/02/2021 05:35 AM  
 WBC 13.0 (H) 04/02/2021 05:35 AM  
 Procalcitonin 0.54 03/31/2021 03:14 PM  
 Lactic acid 1.1 03/31/2021 03:44 PM  
 Lactic acid 1.5 10/20/2014 01:55 PM  
  
Estimated Creatinine Clearance: 14.9 mL/min (A) (based on SCr of 4.18 mg/dL (H)). CULTURES: 
In process No results found for: Estefany Patterson Day 1 of vancomycin. Goal trough is 15-20. I will dose patient with 1750 mg once and dose intermittently given ARACELIS. Pharmacist will continue to follow patient and order levels as clinically indicated. Please call with any questions. Thank you, Ivone Shaffer, Pharm. D.  
PGY1 Pharmacy Resident [phone]

## 2021-04-02 NOTE — ANESTHESIA PROCEDURE NOTES
Spinal Block Start time: 4/2/2021 10:19 AM 
End time: 4/2/2021 10:24 AM 
Performed by: Laina Waggoner MD 
Authorized by: Laina Waggoner MD  
 
Pre-procedure: Indications: primary anesthetic  Preanesthetic Checklist: patient identified, risks and benefits discussed, anesthesia consent, patient being monitored and timeout performed Timeout Time: 10:19 Spinal Block:  
Patient Position:  Seated Prep Region:  Lumbar Prep: chlorhexidine Location:  L3-4 Technique:  Single shot Local Dose (mL):  3 Needle:  
Needle Type:  Pencan Needle Gauge:  25 G Attempts:  1 Events: CSF confirmed, no blood with aspiration and no paresthesia Assessment: 
Insertion:  Uncomplicated Patient tolerance:  Patient tolerated the procedure well with no immediate complications All needles out intact, procedure tolerated well without problems

## 2021-04-02 NOTE — PERIOP NOTES
Dr. Shayne Church gave verbal signout for pt to return to room  611 at this time TRANSFER - OUT REPORT: 
 
Verbal report given to Mali Solorio on Blessing Kingsley  being transferred to  [phone] for routine post - op Report consisted of patients Situation, Background, Assessment and  
Recommendations(SBAR). Information from the following report(s) OR Summary, Procedure Summary, Intake/Output and MAR was reviewed with the receiving nurse. Lines:  
Peripheral IV 03/31/21 Left Forearm (Active) Site Assessment Clean, dry, & intact 04/02/21 1100 Phlebitis Assessment 0 04/02/21 1100 Infiltration Assessment 0 04/02/21 1100 Dressing Status Clean, dry, & intact 04/02/21 1100 Dressing Type Transparent;Tape 04/02/21 1430 Hub Color/Line Status Patent; Infusing 04/02/21 0723 Alcohol Cap Used No 04/02/21 0723 Peripheral IV 04/02/21 Left Hand (Active) Site Assessment Clean, dry, & intact 04/02/21 1100 Phlebitis Assessment 0 04/02/21 1100 Infiltration Assessment 0 04/02/21 1100 Dressing Status Clean, dry, & intact 04/02/21 1100 Opportunity for questions and clarification was provided. Patient transported with: 
 O2 @ 2 liters VTE prophylaxis orders have been written for [facility]. Patient and family given floor number and nurses name. Family updated re: pt status after security code verified.

## 2021-04-02 NOTE — PROGRESS NOTES
Patient is in pain. Requesting pain medication. Talk to patient wife. As per nurse, daughter requested something stronger than Tylenol. I have ordered standing Tylenol after discussion with the patient's wife. Side effects and benefit discussed. Low-dose oxycodone ordered for the patient.

## 2021-04-03 NOTE — PROGRESS NOTES
ACUTE PHYSICAL THERAPY GOALS: 
(Developed with and agreed upon by patient and/or caregiver. ) LTG: 
(1.)Mr. Quach will move from supine to sit and sit to supine , scoot up and down and roll side to side in bed with STAND BY ASSIST within 7 treatment day(s). (2.)Mr. Quach will transfer from bed to chair and chair to bed with STAND BY ASSIST using the least restrictive device within 7 treatment day(s). (3.)Mr. Quach will ambulate with CONTACT GUARD ASSIST for 150+ feet with the least restrictive device within 7 treatment day(s). ________________________________________________________________________________________________ PHYSICAL THERAPY ASSESSMENT: Initial Assessment and AM PT Treatment Day # 1 Antelmo Shelley is a 78 y.o. male PRIMARY DIAGNOSIS: ARACELIS (acute kidney injury) ([facility] ARACELIS (acute kidney injury) ([facility] [N17.9] Procedure(s) (LRB): 
RIGHT KNEE INCISION AND DRAINAGE/RIGHT ELBOW WOUND FLUID ASPIRATION (Right) 1 Day Post-Op Reason for Referral: ICD-10: Treatment Diagnosis: Generalized Muscle Weakness (M62.81) Difficulty in walking, Not elsewhere classified (R26.2) [place]PAT[place]: [place] / [place] B / Product Type: Medicare /  
 
ASSESSMENT:  
 
REHAB RECOMMENDATIONS:  
Recommendation to date pending progress: 
Setting:  Short-term Rehab Equipment:  
 None  To Be Determined PRIOR LEVEL OF FUNCTION: 
(Prior to Hospitalization) INITIAL/CURRENT LEVEL OF FUNCTION: 
(Most Recently Demonstrated) Bed Mobility: 
 Independent Sit to Stand:  Independent Transfers:  Independent Gait/Mobility:  Modified Independent Bed Mobility:  Maximal Assistance Sit to Stand:  Moderate Assistance Transfers:  Moderate Assistance Gait/Mobility:  Not tested ASSESSMENT: 
Mr. Fabrice Christian was supine at arrival with CNA present to gather BP. He has sore and bandaged R elbow and IND on R knee 4/2/21 with ace wrap. dtr present.  Answering questions were corrected with more asst then pt answers. He originally attempted trnf to EOB with sit up style but could not perform. He need min-modA to log roll and get to EOB. Sit-stand x2 with modA. 2nd time took side steps h\up bed and returned to supine. Main restriction and limitation today from further treatment was dictated by orthostatic BP readings. RN made aware and will call MD notify. BP date recorded in flowsheets. Pt will benefit from skilled and sophisticated PT to help improve impairments. DC recommendations will follow, but limited by BP presently. SUBJECTIVE:  
Mr. Camila Freitas states, \"get me up? Hahaha. .\" SOCIAL HISTORY/LIVING ENVIRONMENT: lives in ranch house with able wife. He has rollator he uses, but uncertain with R elbow pain. Does not drive. Home Environment: Private residence One/Two Story Residence: One story Living Alone: Yes Support Systems: Child(delfino), Family member(s), Spouse/Significant Other/Partner OBJECTIVE:  
 
PAIN: VITAL SIGNS: LINES/DRAINS:  
Pre Treatment: Pain Screen Pain Scale 1: Numeric (0 - 10) Pain Intensity 1: 0 Post Treatment: 0 Vital Signs BP: 115/62 MAP (Calculated): 80 
BP 1 Location: Left arm BP 1 Method: Automatic 
BP Patient Position: Supine Additional Blood Pressure/Pulse Data 
BP 2: 104/57 MAP 2 (Calculated): 73 BP 2 Location: Left arm BP Method 2: Automatic Patient Position 2: Sitting BP 3: (!) 78/52 MAP 3 (Calculated): (!) 61 BP 3 Location: Left arm BP Method 3: Automatic Patient Position 3: Standing Villa Catheter and IV 
O2 Device: Nasal cannula GROSS EVALUATION: 
 Within Functional Limits Abnormal/ Functional Abnormal/ Non-Functional (see comments) Not Tested Comments: AROM [] [x] [] [] PROM [] [] [] [] Strength [] [x] [] [] Balance [] [x] [] [] Posture [] [x] [] [] Sensation [x] [] [] [] Coordination [] [] [] [] Tone [] [] [] [] Edema [] [x] [] [] Activity Tolerance [] [x] [] []   
 [] [] [] [] COGNITION/ 
PERCEPTION: Intact Impaired  
(see comments) Comments:  
Orientation [x] [] Vision [x] [] Hearing [x] [] Command Following [x] [] Safety Awareness [] [x]   
 [] [] MOBILITY: I Mod I S SBA CGA Min Mod Max Total  NT x2 Comments:  
Bed Mobility Rolling [] [] [] [] [] [] [x] [x] [] [] [] Supine to Sit [] [] [] [] [] [] [x] [] [] [] [] Scooting [] [] [] [] [] [] [x] [] [] [] [] Sit to Supine [] [] [] [] [] [] [x] [x] [] [] []   
Transfers Sit to Stand [] [] [] [] [] [] [x] [] [] [] [] Bed to Chair [] [] [] [] [] [] [] [] [] [] []   
Stand to Sit [] [] [] [] [] [] [x] [] [] [] [] I=Independent, Mod I=Modified Independent, S=Supervision, SBA=Standby Assistance, CGA=Contact Guard Assistance,  
Min=Minimal Assistance, Mod=Moderate Assistance, Max=Maximal Assistance, Total=Total Assistance, NT=Not Tested GAIT: I Mod I S SBA CGA Min Mod Max Total  NT x2 Comments:  
Level of Assistance [] [] [] [] [] [] [] [] [] [x] [] Distance (2) DME Raul Him Gait Quality Side steps Weightbearing Status WBAT I=Independent, Mod I=Modified Independent, S=Supervision, SBA=Standby Assistance, CGA=Contact Guard Assistance,  
Min=Minimal Assistance, Mod=Moderate Assistance, Max=Maximal Assistance, Total=Total Assistance, NT=Not Tested [facility] Basic Mobility Inpatient Short Form How much difficulty does the patient currently have. .. Unable A Lot A Little None 1. Turning over in bed (including adjusting bedclothes, sheets and blankets)? [] 1   [x] 2   [] 3   [] 4  
2. Sitting down on and standing up from a chair with arms ( e.g., wheelchair, bedside commode, etc.)   [] 1   [x] 2   [] 3   [] 4  
3. Moving from lying on back to sitting on the side of the bed? [] 1   [x] 2   [] 3   [] 4 How much help from another person does the patient currently need. .. Total A Lot A Little None 4.   Moving to and from a bed to a chair (including a wheelchair)? [] 1   [x] 2   [] 3   [] 4  
5. Need to walk in hospital room? [] 1   [x] 2   [] 3   [] 4  
6. Climbing 3-5 steps with a railing? [x] 1   [] 2   [] 3   [] 4  
© 2007, Trustees of [facility] [employer], under license to [employer]. All rights reserved Score:  Initial: 11 Most Recent: X (Date: -- ) Interpretation of Tool:  Represents activities that are increasingly more difficult (i.e. Bed mobility, Transfers, Gait). PLAN:  
FREQUENCY/DURATION: PT Plan of Care: 3 times/week for duration of hospital stay or until stated goals are met, whichever comes first. 
 
PROBLEM LIST:  
(Skilled intervention is medically necessary to address:) 1. Decreased ADL/Functional Activities 2. Decreased Activity Tolerance 3. Decreased AROM/PROM 4. Decreased Balance 5. Decreased Cognition 6. Decreased Coordination 7. Decreased Gait Ability 8. Decreased Strength 9. Decreased Transfer Abilities 10. Increased Pain INTERVENTIONS PLANNED:  
(Benefits and precautions of physical therapy have been discussed with the patient.) 1. Therapeutic Activity 2. Therapeutic Exercise/HEP 3. Neuromuscular Re-education 4. Gait Training 5. Education TREATMENT:  
 
EVALUATION: High Complexity : (Untimed Charge) TREATMENT:  
($$ Therapeutic Activity: 8-22 mins    ) Therapeutic Activity (20 Minutes): Therapeutic activity included Rolling, Supine to Sit, Sit to Supine, Scooting, Transfer Training, Sitting balance  and Standing balance to improve functional Mobility, Strength and Activity tolerance. TREATMENT GRID: 
N/A 
 
AFTER TREATMENT POSITION/PRECAUTIONS: 
Bed, Needs within reach and RN notified INTERDISCIPLINARY COLLABORATION: 
RN/PCT and PT/PTA TOTAL TREATMENT DURATION: 
PT Patient Time In/Time Out Time In: 1050 Time Out: 1135 Eleni Cole DPT

## 2021-04-03 NOTE — PROGRESS NOTES
Pt dressing remains c/d/i. Pt disoriented but pleasant. No c/o of pain. Pt family anxious to work with PT. Pt reminded to eat well. Hourly rounds performed through shift, pt denies needs at this time. Bed in low position and call light/ personal items within reach. Will continue to monitor and report to day shift nurse.

## 2021-04-03 NOTE — PROGRESS NOTES
[facility] CARDIOLOGY PROGRESS NOTE 
      
 
4/3/2021 9:12 AM 
 
Admit Date: 3/31/2021 Subjective: The patient does not report angina, shortness of breath at rest, or palpitations. ROS: 
Constitutional:   Negative for fevers and unexplained weight loss. Eyes:   Negative for visual disturbance. ENT:   Negative for significant hearing loss and tinnitus. Respiratory:   Negative for hemoptysis. Cardiovascular:   Negative except as noted in HPI. Gastrointestinal:   Negative for melena and abdominal pain. Genitourinary:   Negative for hematuria, renal stones. Integumentary:   Negative for rash or non-healing wounds Hematologic/Lymphatic:   Negative for excessive bleeding hx or clotting disorder. Musculoskeletal:  Negative for active, unexplained/severe joint pain. Neurological:   Negative for stroke. Behavioral/Psych:   Negative for suicidal ideations. Endocrine:   Negative for uncontrolled diabetic symptoms including polyuria, polydipsia and poor wound healing. Objective:  
  
Vitals:  
 04/02/21 1923 04/02/21 2318 04/03/21 8012 04/03/21 9279 BP: (!) 104/56 106/60 (!) 103/50 (!) 114/42 Pulse: 76 78 72 71 Resp: 16 16 17 17 Temp: 97.9 °F (36.6 °C) 97.9 °F (36.6 °C) 97.2 °F (36.2 °C) 97.7 °F (36.5 °C) SpO2: 99% 100% 99% 97% Weight:      
Height:      
 
 
Physical Exam: 
General-No Acute Distress Neck- supple, no JVD 
CV- regular rate and rhythm no RG 
Lung- left lung basilar crackles Abd- soft, nontender, nondistended Ext- no edema bilaterally. Skin- warm and dry Data Review:  
Recent Labs 04/03/21 
7620 04/02/21 
1456 04/02/21 
0535 04/02/21 
1908  146*  --  145  
K 3.3* 3.1*   < > 2.5* MG 2.0  --   --  1.6*  
BUN 59* 66*  --  71* CREA 3.50* 3.97*  --  4.18* * 108*  --  160* WBC 14.2*  --   --  13.0* HGB 8.3*  --   --  8.3* HCT 26.1*  --   --  26.2*  
PLT 90*  --   --  95*  
 < > = values in this interval not displayed. Assessment/Plan:  
 
Paroxysmal atrial fibrillation - currently in sinus rhythm 
- presumably not on [address] 2/2 falls, as per primary cardiologist 
- patient with anemia and thrombocytopenia in the hospital 
- consider Rom Mcelroy as an outpatient if NOAC thought to be contraindicated Hx of CABG 
- c/w ASA, statin, and BB Ischemic cardiomyopathy 
- convert metoprolol tartrate to succinate upon discharge 
- sCr 3.5: withhold ACEi/ARB/aldactone at this time Septic bursitis 
- mgmt per hospitalist/ID Pulmonary fibrosis 
- consider pulmonary consult [facility] 
- consider repeat CXR with left lung crackles Carlos Colorado MD

## 2021-04-03 NOTE — PROGRESS NOTES
[facility] Nephrology Subjective: A on CKD  Feels better today. Review of Systems -  
General ROS: negative for - fever, chills Respiratory ROS: no SOB, cough, PACHECO Cardiovascular ROS: no CP, palpitations Gastrointestinal ROS: no N&V, abdominal pain, diarrhea Genito-Urinary ROS: no difficulty voiding, dysuria Neurological ROS: no seizures, focal weekness Objective: 
 
Vitals:  
 04/03/21 0416 04/03/21 0755 04/03/21 1103 04/03/21 1155 BP: (!) 103/50 (!) 114/42 115/62 115/62 Pulse: 72 71 68 Resp: 17 17 18 Temp: 97.2 °F (36.2 °C) 97.7 °F (36.5 °C) 97.5 °F (36.4 °C) SpO2: 99% 97% 98% Weight:      
Height:      
 
 
PE 
Gen: in no acute distress CV:reg rate Chest:clear Abd: soft Ext/Access: no edema [facility] LAB Recent Labs 04/03/21 
6901 04/02/21 
1885 04/01/21 
2083 WBC 14.2* 13.0* 12.4* HGB 8.3* 8.3* 8.8* HCT 26.1* 26.2* 27.2*  
PLT 90* 95* 101* Recent Labs 04/03/21 
0267 04/02/21 
1456 04/02/21 
7412 04/02/21 
0535 03/31/21 
1514 03/31/21 
1514  146*  --  145   < > 143 K 3.3* 3.1* 3.1* 2.5*   < > 3.5 * 118*  --  118*   < > 116* CO2 13* 16*  --  15*   < > 15* * 108*  --  160*   < > 83 BUN 59* 66*  --  71*   < > 80* CREA 3.50* 3.97*  --  4.18*   < > 5.16* MG 2.0  --   --  1.6*  --  1.9 PHOS  --   --   --   --   --  5.8*  
CA 7.7* 8.0*  --  7.7*   < > 8.4 ALB 2.1*  --   --   --   --  2.5* TBILI 0.3  --   --   --   --  0.8 ALT 20  --   --   --   --  37  
 < > = values in this interval not displayed. Radiology A/P:  
Patient Active Problem List  
Diagnosis Code  Acute renal failure (HCC) N17.9  Syncope R55  Orthostatic hypotension I95.1  History of ileostomy Z98.890  
 Dehydration E86.0  Anorexia R63.0  Weakness generalized R53.1  Fatigue R53.83  
 Malaise R53.81  
 S/P CABG x 4 Z95.1  Hypertension I10  
 History of ulcerative colitis Z87.19  
 Hypomagnesemia E83.42  
 Atherosclerosis of native coronary artery of native heart without angina pectoris I25.10  Adrenal insufficiency due to [facility] therapy ([facility] E27.3  LV dysfunction I51.9  Abnormal cardiovascular stress test R94.39  
 Chronic renal failure, stage 4 (severe) ([facility]) N18.4  Anemia D64.9  
 Ischemic cardiomyopathy I25.5  ARACELIS (acute kidney injury) ([facility] N17.9  Atrial fibrillation ([facility]) I48.91  
 Pulmonary interstitial fibrosis ([facility]) J84.10  PAF (paroxysmal atrial fibrillation) ([facility]) I48.0 A on CKD4 - labs are better today. Will continue carefull hydration Pulm Fibrosis Thrombocytopenia Septic Bursitis - s/p I &D Orthostatic Hypotension Adrenal Insufficiency Raoul Francis MD

## 2021-04-03 NOTE — PROGRESS NOTES
Pt resting quietly with wife at bedside. No c/o pain or discomfort at this time. Will continue to monitor and report to oncoming shift.

## 2021-04-03 NOTE — PROGRESS NOTES
[facility] Service History and Physical 
 
Patient ID: Sheree Peoples 
male 1940 
[id number] Admission Date: 3/31/2021 Chief Complaint: SOB, PACHECO Reason for Admission: ARACELIS on CKD ASSESSMENT & PLAN: 
 
#ARACELIS on CKD4 
- baseline Cr ~1.6, Cr on admission 5.16 
- likely cardiorenal, but patient not overtly hypervolemic 
- UA unremarkable - Urine Cr and Na ordered 
- RASTA unremarkable - euvolemic/hypovolemic on exam, although NT pro-BNP 19k, no previous BNP to compare - s/p 500cc bolus, will try Albumin + low dose Lasix overnight 
- cont sodium bicarb  
- consult nephrology for AM 
 
April 1: Creatinine improving with gentle hydration. Nephrology on board. 
-Fluid adjusted based on electrolyte abnormality. Potassium repleted. April 2: Creatinine improving with fluids. Nephrology on board. Potassium repleted. April 3: Creatinine improving. We will continue cautious hydration. Potassium repleted. Close monitoring. #Afib 
- HR  in Afib, EKG with Afib 
- trop 767->718 likely due to Atrial Fibrillation  
- trial low dose lopressor BID 
- keep K>4, Mg>2 - check Mg, replace with 20meq KCl  
- not on Eliquis due to fall risk - Lopressor IV prn for RVR 
- consult cardiology April 1: Cardiology on board. Already of Eliquis after discussion with cardiologist about risk and benefit. Continue AV trina blocking agent as needed. #Right knee cellulitis/inflammation - WBC 12.9, no clear source of infection - R knee XR with mild CPPD suspected - UA and CXR unremarkable - possibly related to pseudogout R knee vs steroid administration as below  
- not candidate for colchicine or NSAIDs due to ARACELIS on CKD 
- consider orthopedic surgery consult for injection April 1: Cannot rule out cellulitis and infection. Significant erythema with elevated procalcitonin. Will start patient on ceftriaxone and doxy. Patient also evaluated by Ortho and suspicious for prepatellar bursitis. April 2: I&D of prepatellar bursa showed significant amount of purulence. Infectious disease consulted. April 3: Patient currently on daptomycin and ceftriaxone. Culture showing heavy staph growth from wound. We will continue current antibiotics for now. #Adrenal Insufficiency  
- cont with home hydrocortisone, fludrocortisone #HFrEF 
- EF 25% on most recent ECHO 
- hypotension and orthostasis limiting HF medications 
- try on low dose lopressor BID for rate control, hold for SBP < 100 April 1: Appreciate cardiology recommendations. #CAD s/p CABG 
- cont ASA, statin 
- trial Lopressor BID April 1: Appreciate cardiology recommendation. #Acute on Chronic Anemia - , check B12/folate 
- Hgb 10, baseline 12-13 
- no obvious blood loss 
- transfuse for Hgb<8 in setting of CAD April 1: Hemoglobin 8.8. Anticipate further drop given patient looks slightly dehydrated on examination. -B12 level and folate level not low. We will check iron studies and TSH with next blood work-up. #GERD - cont PPI #Sacral ulcer: Examination deferred patient was evaluated in preop area. Wound consult placed. April 3: Small less than 1 cm skin tear in the gluteal area without any signs or symptoms of infection. Diet: renal  
VTE ppx: Heparin for DVT prophylaxis. GI ppx: PPI Patient's daughter Deena Sutton [[id number]] and wife was at bedside. Extensively discussed goals of care. DNR status verified. Patient and family does not want to get patient intubated even only in case of respiratory failure. So patient is DNR and DNI. April 2: Patient's daughter was present at bedside. Extensively discussed the plan with patient's daughter. She has already discussed hospice with patient's wife. In next 24 to 48 hours, we will have goals of care discussion again based on clinical response and overall progress.   Daughter understands that patient condition may deteriorate in spite of treatment and overall patient has multiple chronic life-threatening problems. April 3: Patient's daughter was present at bedside. Discussed all the labs and finding. Discussed heparin for DVT prophylaxis and family is in agreement. She verbalized understanding that patient has multiple chronic problem and his condition may deteriorate in spite of treatment. HISTORY OF PRESENT ILLNESS: 
Patient is a 78 y.o. 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 intake and extreme weakness. He was found to have acute on chronic kidney injury and right knee cellulitis/deep tissue infection. April 1, 2021: Patient was evaluated by cardiology, nephrology and orthopedic surgeon. Patient is AO x3 with cues. Daughter and wife present bedside and contributed to the history. April 2: Status post I&D with drainage of purulence from right knee. Also right elbow was aspirated which has known hematoma and x-ray negative for fractures per family. Patient had delirium overnight with some anxiety. Which improved with hydroxyzine. April 3: Patient looks more alert than yesterday. Oriented with cues. As per patient his pain has improved significantly. Overnight had delirium which did not require any significant medications. Rest review of system negative except mentioned above. PHYSICAL EXAM: 
 
Visit Vitals /62 (BP 1 Location: Left arm, BP Patient Position: Supine) Pulse 68 Temp 97.5 °F (36.4 °C) Resp 18 Ht 6' 3\" (1.905 m) Wt 77.1 kg (170 lb) SpO2 98% BMI 21.25 kg/m² General: No acute distress, speaking in full sentences, no use of accessory muscles HEENT: Pupils equal.  Extraocular muscle intact.  
Neck: Supple, no JVD Lungs: Bilateral lower lobe crackles otherwise clear to auscultate bilaterally Cardiovascular: Irregularly irregular with normal S1 and S2 Abdomen: Soft, nontender, nondistended, normoactive bowel sounds Extremities: No cyanosis clubbing. 1+ Edema. Neuro: Nonfocal, A&O x3 with cues. Psych: Mild cognitive impairment noted. Skin: Dressing on right knee and left elbow [intact for surgical site exam to surgery]. Small, 1 cm skin tear without any signs or infection on right gluteal area. Procedures done this admission: 
Procedure(s): RIGHT KNEE INCISION AND DRAINAGE/RIGHT ELBOW WOUND FLUID ASPIRATION All Micro Results Procedure Component Value Units Date/Time CULTURE, BODY FLUID [address] [[id number]]  (Abnormal) Collected: 04/02/21 1039 Order Status: Completed Specimen: Knee  Updated: 04/03/21 1343 Special Requests: --     
  RIGHT PATELLA PUS 
  
  GRAM STAIN    
  WBCS SEEN TOO NUMEROUS TO COUNT MODERATE GRAM POSITIVE COCCI Culture result: MODERATE STAPHYLOCOCCUS AUREUS SUBCULTURE IN PROGRESS  
     
 CULTURE, BODY FLUID [address] [[id number]]  (Abnormal) Collected: 04/02/21 1045 Order Status: Completed Specimen: Elbow Updated: 04/03/21 1343 Special Requests: RIGHT     
  GRAM STAIN    
  WBCS SEEN TOO NUMEROUS TO COUNT MODERATE GRAM POSITIVE COCCI Culture result:    
  HEAVY STAPHYLOCOCCUS AUREUS SUBCULTURE IN PROGRESS  
     
 CULTURE, BLOOD [[id number]] Collected: 03/31/21 2135 Order Status: Completed Specimen: Blood Updated: 04/03/21 0941 Special Requests: --     
  RIGHT 
HAND Culture result: NO GROWTH 3 DAYS     
 CULTURE, ANAEROBIC [[id number]] Collected: 04/02/21 1045 Order Status: Completed Updated: 04/02/21 1445 Ynes Paula [[id number]] Collected: 04/02/21 1039 Order Status: Completed Updated: 04/02/21 1439 CULTURE, Jose C Rangel STAIN [[id number]]  Order Status: Sent Specimen: Wound from Knee CULTURE, ANAEROBIC [[id number]] Order Status: Sent Specimen: Surgical Specimen Bhavna Mills STAIN [[id number]] Collected: 04/02/21 1045 Order Status: Canceled Specimen: Wound from Elbow SARS-CoV-2 Lab Results \"Novel Coronavirus\" Test: No results found for: COV2NT \"Emergent Disease\" Test: No results found for: EDPR \"SARS-COV-2\" Test: No results found for: XGCOVT \"Precision Labs\" Test: No results found for: RSLT Rapid Test: No results found for: COVR Labs: Results:  
   
BMP, Mg, Phos Recent Labs 04/03/21 
4403 04/02/21 
1456 04/02/21 
9009 04/02/21 
5750  146*  --  145  
K 3.3* 3.1* 3.1* 2.5*  
* 118*  --  118* CO2 13* 16*  --  15* AGAP 12 12  --  12  
BUN 59* 66*  --  71* CREA 3.50* 3.97*  --  4.18* CA 7.7* 8.0*  --  7.7*  
* 108*  --  160* MG 2.0  --   --  1.6* CBC Recent Labs 04/03/21 
0832 04/02/21 
9514 04/01/21 
6416 WBC 14.2* 13.0* 12.4*  
RBC 2.47* 2.47* 2.62* HGB 8.3* 8.3* 8.8* HCT 26.1* 26.2* 27.2*  
PLT 90* 95* 101* GRANS 91* 90* 90* LYMPH 2* 2* 2*  
EOS 0* 2 2 MONOS 4 5 4 BASOS 0 0 0 IG 2 2 2 ANEU 12.9* 11.7* 11.1* ABL 0.3* 0.3* 0.2* MARIA R 0.0 0.2 0.3 ABM 0.6 0.6 0.5 ABB 0.0 0.0 0.0 AIG 0.3 0.3 0.2 LFT Recent Labs 04/03/21 
5847 ALT 20 AP 62  
TP 4.8* ALB 2.1*  
GLOB 2.7 AGRAT 0.8* Cardiac Testing Lab Results Component Value Date/Time CK 58 10/20/2014 07:12 PM  
 Troponin-I, Qt. <0.02 (L) 10/20/2014 07:12 PM  
  
Coagulation Tests Lab Results Component Value Date/Time Prothrombin time 10.5 05/04/2015 11:03 AM  
 INR 1.0 05/04/2015 11:03 AM  
  
A1c No results found for: HBA1C, HGBE8, VGH6IMJC, GMS4AHZI Lipid Panel No results found for: CHOL, CHOLPOCT, CHOLX, CHLST, CHOLV, 599953, HDL, HDLP, LDL, LDLC, DLDLP, 828291, VLDLC, VLDL, TGLX, TRIGL, TRIGP, TGLPOCT, CHHD, CHHDX Thyroid Panel Lab Results Component Value Date/Time TSH 0.741 04/02/2021 05:35 AM  
 T4, Free 0.8 04/02/2021 05:35 AM  
    
Most Recent UA Lab Results Component Value Date/Time Color YELLOW 04/01/2021 10:00 AM  
 Appearance CLEAR 04/01/2021 10:00 AM  
 Specific gravity 1.017 10/20/2014 08:20 PM  
 pH (UA) 5.5 04/01/2021 10:00 AM  
 Protein Negative 04/01/2021 10:00 AM  
 Glucose Negative 04/01/2021 10:00 AM  
 Ketone Negative 04/01/2021 10:00 AM  
 Bilirubin Negative 04/01/2021 10:00 AM  
 Blood Negative 04/01/2021 10:00 AM  
 Urobilinogen 0.2 04/01/2021 10:00 AM  
 Nitrites Negative 04/01/2021 10:00 AM  
 Leukocyte Esterase Negative 04/01/2021 10:00 AM  
 WBC 0-3 03/31/2021 05:55 PM  
 RBC 10-20 03/31/2021 05:55 PM  
 Epithelial cells 0-3 03/31/2021 05:55 PM  
 Bacteria 0 03/31/2021 05:55 PM  
 Casts 0-3 03/31/2021 05:55 PM  
  
 
 
 
Active Problems: 
 
Patient Active Problem List  
 Diagnosis Date Noted  Pulmonary interstitial fibrosis ([address] 04/01/2021  PAF (paroxysmal atrial fibrillation) ([facility] 04/01/2021  ARACELIS (acute kidney injury) ([address] 03/31/2021  Atrial fibrillation ([address] 03/31/2021  Chronic renal failure, stage 4 (severe) ([address] 11/12/2020  Anemia 11/12/2020  Ischemic cardiomyopathy 11/12/2020  Abnormal cardiovascular stress test 02/27/2020  LV dysfunction 02/03/2020  Atherosclerosis of native coronary artery of native heart without angina pectoris 12/26/2019  Adrenal insufficiency due to cancer therapy ([address] 12/26/2019  Hypomagnesemia 10/22/2014  Syncope 10/21/2014  Orthostatic hypotension 10/21/2014  History of ileostomy 10/21/2014  Dehydration 10/21/2014  Anorexia 10/21/2014  Weakness generalized 10/21/2014  Fatigue 10/21/2014  Malaise 10/21/2014  S/P CABG x 4 10/21/2014  Hypertension 10/21/2014  History of ulcerative colitis 10/21/2014  Acute renal failure ([facility] 10/20/2014 Jose Alejandro Fernandes MD 
[address] [place], [place] [address]ist Service 4/3/2021 6:52 PM

## 2021-04-03 NOTE — PROGRESS NOTES
ORTHO PROGRESS NOTE April 3, 2021 Admit Date: 3/31/2021 Admit Diagnosis: ARACELIS (acute kidney injury) ([facility] [N17.9] Post Op day: 1 Day Post-Op Subjective:  
 
Rheba Ahumada is a patient who is now 1 Day Post-Op from I&D right knee and aspiration right olecranon bursa. Preliminary cultures heavy staph. ID following. Elbow pain improved, still has knee pain. Family at bedside Objective:  
 
PT/OT:  
  
 
Vital Signs:   
Patient Vitals for the past 8 hrs: 
 BP Temp Pulse Resp SpO2  
21 0755 (!) 114/42 97.7 °F (36.5 °C) 71 17 97 % 21 0416 (!) 103/50 97.2 °F (36.2 °C) 72 17 99 % Temp (24hrs), Av.8 °F (36.6 °C), Min:97.2 °F (36.2 °C), Max:98.2 °F (36.8 °C) LAB:   
Recent Labs 21 
0897 HGB 8.3* WBC 14.2*  
PLT 90* I/O: 
No intake/output data recorded.  1901 -  0700 In: 1114 [I.V.:1288] Out: 8248 [IPCWT:3302] Physical Exam: 
 
Awake and in no acute distress. Pleasantly confused. Mood and affect appropriate. Respirations unlabored and no evidence cyanosis. Dressing clean/dry Assessment:  
  
Patient Active Problem List  
Diagnosis Code  Acute renal failure (HCC) N17.9  Syncope R55  Orthostatic hypotension I95.1  History of ileostomy Z98.890  
 Dehydration E86.0  Anorexia R63.0  Weakness generalized R53.1  Fatigue R53.83  
 Malaise R53.81  
 S/P CABG x 4 Z95.1  Hypertension I10  
 History of ulcerative colitis Z87.19  
 Hypomagnesemia E83.42  
 Atherosclerosis of native coronary artery of native heart without angina pectoris I25.10  Adrenal insufficiency due to cancer therapy ([facility] E27.3  LV dysfunction I51.9  Abnormal cardiovascular stress test R94.39  
 Chronic renal failure, stage 4 (severe) (HCC) N18.4  Anemia D64.9  
 Ischemic cardiomyopathy I25.5  ARACELIS (acute kidney injury) ([facility] N17.9  Atrial fibrillation (HCC) I48.91  
 Pulmonary interstitial fibrosis (HCC) J84.10  PAF (paroxysmal atrial fibrillation) (HCC) I48.0  
 
 
1 Day Post-Op STATUS POST Procedure(s): RIGHT KNEE INCISION AND DRAINAGE/RIGHT ELBOW WOUND FLUID ASPIRATION Plan:  
 
Continue PT/OT/Rehab Signed By: Lalitha Kearns NP

## 2021-04-04 NOTE — PROGRESS NOTES
[address] Nephrology Subjective: A on CKD  Feels better today. No new complaints Review of Systems -  
General ROS: negative for - fever, chills Respiratory ROS: no SOB, cough, PACHECO Cardiovascular ROS: no CP, palpitations Gastrointestinal ROS: no N&V, abdominal pain, diarrhea Genito-Urinary ROS: no difficulty voiding, dysuria Neurological ROS: no seizures, focal weekness Objective: 
 
Vitals:  
 04/03/21 2032 04/03/21 2355 04/04/21 0400 04/04/21 0745 BP: (!) 110/59 103/61 123/68 118/67 Pulse: 68 79 61 70 Resp: 16 16 16 16 Temp: 98.4 °F (36.9 °C) 97.5 °F (36.4 °C) 97.5 °F (36.4 °C) 98.6 °F (37 °C) SpO2: 99% 99% 98% 100% Weight:      
Height:      
 
 
PE 
Gen: in no acute distress CV:reg rate Chest:clear Abd: soft Ext/Access: no edema Polina Salm LAB Recent Labs 04/04/21 
1407 04/03/21 
7376 04/02/21 
5591 WBC 13.9* 14.2* 13.0* HGB 8.2* 8.3* 8.3* HCT 25.5* 26.1* 26.2*  
PLT 89* 90* 95* Recent Labs 04/04/21 
6697 04/03/21 
1702 04/03/21 
6141 04/02/21 
0535 04/02/21 
0754   144 143 144   < > 145  
K 3.2*  3.2* 3.6 3.3*   < > 2.5*  
*  119* 119* 119*   < > 118* CO2 14*  14* 14* 13*   < > 15* *  138* 153* 162*   < > 160* BUN 54*  54* 58* 59*   < > 71* CREA 3.22*  3.22* 3.39* 3.50*   < > 4.18* MG 1.9  --  2.0  --  1.6* PHOS 3.2  --   --   --   --   
CA 7.8*  7.8* 8.0* 7.7*   < > 7.7* ALB 1.9*  --  2.1*  --   --   
TBILI  --   --  0.3  --   --   
ALT  --   --  20  --   --   
 < > = values in this interval not displayed. Radiology A/P:  
Patient Active Problem List  
Diagnosis Code  Acute renal failure (HCC) N17.9  Syncope R55  Orthostatic hypotension I95.1  History of ileostomy Z98.890  
 Dehydration E86.0  Anorexia R63.0  Weakness generalized R53.1  Fatigue R53.83  
 Malaise R53.81  
 S/P CABG x 4 Z95.1  Hypertension I10  
 History of ulcerative colitis Z87.19  
 Hypomagnesemia E83.42  Atherosclerosis of native coronary artery of native heart without angina pectoris I25.10  Adrenal insufficiency due to [facility] therapy ([facility] E27.3  LV dysfunction I51.9  Abnormal cardiovascular stress test R94.39  
 Chronic renal failure, stage 4 (severe) ([facility]) N18.4  Anemia D64.9  
 Ischemic cardiomyopathy I25.5  ARACELIS (acute kidney injury) ([facility] N17.9  Atrial fibrillation ([facility]) I48.91  
 Pulmonary interstitial fibrosis ([facility]) J84.10  PAF (paroxysmal atrial fibrillation) ([facility]) I48.0 A on CKD4 - labs continue to improve. . Will continue carefull hydration Pulm Fibrosis Thrombocytopenia Septic Bursitis - s/p I &D Orthostatic Hypotension Adrenal Insufficiency Jean House MD

## 2021-04-04 NOTE — PROGRESS NOTES
Infectious Disease Progress Note Today's Date: 2021 Admit Date: 3/31/2021 Impression: · R knee prepatellar bursitis, s/p washout , op cultures MSSA · R elbow olecranon bursitis, s/p aspiration /, cultures MSSA · ARACELIS Plan:  
· Stop Dapto and Rocephin · Start Ancef Anti-infectives: · Ceftriaxone · doxycycline Subjective:  
 Chart review only Allergies Allergen Reactions  Levaquin [Levofloxacin] Other (comments) Acute renal failure. Review of Systems:  Pertinent items are noted in the History of Present Illness. Objective:  
 
Visit Vitals /60 (BP 1 Location: Left upper arm) Pulse 71 Temp 98.6 °F (37 °C) Resp 16 Ht 6' 3\" (1.905 m) Wt 77.1 kg (170 lb) SpO2 99% BMI 21.25 kg/m² Temp (24hrs), Av °F (36.7 °C), Min:97.5 °F (36.4 °C), Max:98.6 °F (37 °C) Lines:  Peripheral IV:    
 
Physical Exam:   
General:  Alert, cooperative, well nourished, well developed, appears stated age Eyes:  Sclera anicteric, EOMI Mouth/Throat: Mucous membranes normal, oral pharynx clear Neck: Supple Lungs:   Clear to auscultation bilaterally, good effort CV:  Regular rate and rhythm, no audible murmur, click, rub or gallop Abdomen:   Soft, non-tender. bowel sounds normal. non-distended Extremities: No cyanosis; R knee wrapped in surgical wrap; R elbow wrapped Skin: no acute rash; multiple areas of bruising Musculoskeletal: No deformity, moves everything Lines/Devices:  Intact, no erythema, drainage or tenderness Psych: Alert and oriented, normal mood affect given the setting Data Review: CBC: 
Recent Labs 21 
5477 21 
5167 21 
9210 WBC 13.9* 14.2* 13.0* GRANS 90* 91* 90* MONOS 5 4 5 EOS 1 0* 2 ANEU 12.4* 12.9* 11.7* ABL 0.3* 0.3* 0.3* HGB 8.2* 8.3* 8.3* HCT 25.5* 26.1* 26.2*  
PLT 89* 90* 95* BMP: 
Recent Labs 21 
4177 21 
1702 21 
0636 CREA 3.22*  3.22* 3.39* 3.50* BUN 54*  54* 58* 59*   144 143 144  
K 3.2*  3.2* 3.6 3.3*  
*  119* 119* 119* CO2 14*  14* 14* 13* AGAP 11  11 10 12 *  138* 153* 162* LFTS: 
Recent Labs 04/04/21 
9019 04/03/21 
3382 TBILI  --  0.3 ALT  --  20  
AP  --  62  
TP  --  4.8* ALB 1.9* 2.1* Microbiology:  
 
All Micro Results Procedure Component Value Units Date/Time CULTURE, BLOOD [[id number]] Collected: 03/31/21 2135 Order Status: Completed Specimen: Blood Updated: 04/04/21 1259 Special Requests: --     
  RIGHT 
HAND Culture result: NO GROWTH 4 DAYS     
 CULTURE, BODY FLUID Rosa Maria Quant STAIN [[id number]]  (Abnormal)  (Susceptibility) Collected: 04/02/21 1039 Order Status: Completed Specimen: Knee  Updated: 04/04/21 5580 Special Requests: --     
  RIGHT PATELLA PUS 
  
  GRAM STAIN    
  WBCS SEEN TOO NUMEROUS TO COUNT MODERATE GRAM POSITIVE COCCI Culture result: MODERATE STAPHYLOCOCCUS AUREUS  
     
 CULTURE, BODY FLUID [address] [[id number]]  (Abnormal)  (Susceptibility) Collected: 04/02/21 1045 Order Status: Completed Specimen: Elbow Updated: 04/04/21 5195 Special Requests: RIGHT     
  GRAM STAIN    
  WBCS SEEN TOO NUMEROUS TO COUNT MODERATE GRAM POSITIVE COCCI Culture result:    
  HEAVY STAPHYLOCOCCUS AUREUS  
     
 CULTURE, ANAEROBIC [[id number]] Collected: 04/02/21 1045 Order Status: Completed Updated: 04/02/21 1445 Sedonia Rolling [[id number]] Collected: 04/02/21 1039 Order Status: Completed Updated: 04/02/21 1439 CULTURE, Sneha Parody STAIN [[id number]] Order Status: Sent Specimen: Wound from Knee CULTURE, ANAEROBIC [[id number]] Order Status: Sent Specimen: Surgical Specimen Kenney Amparo STAIN [[id number]] Collected: 04/02/21 1045 Order Status: Canceled Specimen: Wound from Elbow Imaging: As noted Signed By: Jd Vasques NP April 4, 2021

## 2021-04-04 NOTE — PROGRESS NOTES
Hourly rounds completed with bed in low/locked position and call light within reach. Patient has rested this shift with daughter at bedside. Patient has IVF continuous, dressings c/d/i without breakthrough drainage. Patient rotated on sides this shift to relieve pressure from his bottom. Patient garcia patent/draining and ileostomy emptied twice. Telemetry monitoring in place showing NSR. All needs met at this time, will give report to oncoming RN. Date 04/03/21 0700 - 04/04/21 8606 04/04/21 0700 - 04/05/21 7604 Shift 3051-7798 [id number] 24 Hour Total 4473-2156 3945-2168 24 Hour Total  
INTAKE Shift Total(mL/kg) OUTPUT Urine(mL/kg/hr) 250(0.3) 375 625 Urine Output (mL) (Urinary Catheter 03/31/21 2- way) 250 375 625 Stool  Output (ml) (Ileostomy  Right ; Lower  Abdomen)  Shift Total(mL/kg) 675(8.8) 955(12.4) [id number]) NET -679 -676 -[phone] Weight (kg) 77.1 77.1 77.1 77.1 77.1 77.1

## 2021-04-04 NOTE — PROGRESS NOTES
ACUTE OT GOALS: 
(Developed with and agreed upon by patient and/or caregiver.) 1. Pt will toilet with min A 2. Pt will complete functional mobility for ADLs with min A 3. Pt will complete lower body dressing with min A using AE as needed 4. Pt will complete UB bathing and dressing with min A 5. Pt will demonstrate independence with HEP to promote increased BUE strength, ROM, and functional use for ADLs 6. Pt will tolerate 23 minutes functional activity with min or fewer rest breaks to promote increased endurance for ADLs 7. Pt will complete bed mobility with SBA in prep for ADLs Timeframe: 7 days OCCUPATIONAL THERAPY ASSESSMENT: Initial Assessment and Daily Note OT Treatment Day # 1 Antelmo Shelley is a 78 y.o. male PRIMARY DIAGNOSIS: ARACELIS (acute kidney injury) ([facility] [address] ARACELIS (acute kidney injury) ([facility] [address] [N17.9] Procedure(s) (LRB): 
RIGHT KNEE INCISION AND DRAINAGE/RIGHT ELBOW WOUND FLUID ASPIRATION (Right) 2 Days Post-Op Reason for Referral:  ARACELIS, R elbow aspiration and R knee I&D ICD-10: Treatment Diagnosis: Generalized Muscle Weakness (M62.81) History of falling (Z91.81) [place]PAT[place]: [place] / [place] B / Product Type: Medicare /  
ASSESSMENT:  
 
REHAB RECOMMENDATIONS:  
Recommendation to date pending progress: 
Setting:  Short-term Rehab Equipment:  
 None PRIOR LEVEL OF FUNCTION: 
(Prior to Hospitalization)  INITIAL/CURRENT LEVEL OF FUNCTION: 
(Based on today's evaluation) Bathing: 
[place] Dressing: 
[place] Feeding/Grooming:  Independent Toileting:  Independent Functional Mobility:  Modified Independent Bathing:  Moderate Assistance Dressing:  Moderate Assistance Feeding/Groomin [facility] Toileting:  Moderate Assistance Functional Mobility:  Moderate Assistance ASSESSMENT: 
Mr. Fabrice Christian presented with deficits in mobility, balance, endurance, and ROM following R elbow aspiration and R knee I&D. Pt required min A for bed mobility and to scoot, became anxious with mobility and declined attempting to stand. RUE ROM decreased at elbow and c/o pain in R knee limiting mobility. Max A to don socks, CGA grooming and hygiene, set up for feeding. Pt is below his functional baseline and would benefit from skilled OT services to address deficits. SUBJECTIVE:  
Mr. Stephane Daniel states, \"I don't want to do that (get up) today. \" SOCIAL HISTORY/LIVING ENVIRONMENT: Lives with spouse, supervision and set up for bathing and dressing. Uses a RW, 3 recent falls. Walk in shower with shower chair and grab bars, elevated toilet. Home Environment: Private residence One/Two Story Residence: One story Living Alone: Yes Support Systems: Child(delfino), Family member(s), Spouse/Significant Other/Partner OBJECTIVE:  
 
PAIN: VITAL SIGNS: LINES/DRAINS:  
Pre Treatment: Pain Screen Pain Scale 1: Numeric (0 - 10) Pain Intensity 1: 0 Post Treatment: 0   O2 Device: Nasal cannula GROSS EVALUATION: 
BUE Within Functional Limits Abnormal/ Functional Abnormal/ Non-Functional (see comments) Not Tested Comments: AROM [] [x] [] [] < R elbow PROM [] [] [] [] Strength [] [x] [] [] Balance [] [x] [] [] Posture [] [] [] [] Sensation [] [] [] [] Coordination [x] [] [] [] Tone [] [] [] [] Edema [] [] [] [] Activity Tolerance [] [x] [] []   
 [] [] [] [] COGNITION/ 
PERCEPTION: Intact Impaired  
(see comments) Comments:  
Orientation [] [] Vision [] [] Hearing [] [] Judgment/ Insight [] [] Attention [x] [] Memory [] [x] Command Following [x] [] Emotional Regulation [] []   
 [] [] ACTIVITIES OF DAILY LIVING: I Mod I S SBA CGA Min Mod Max Total NT Comments BASIC ADLs:             
Bathing/ Showering [] [] [] [] [] [] [] [] [] [] Toileting [] [] [] [] [] [] [] [] [] [] Dressing [] [] [] [] [] [] [] [x] [] [] socks Feeding [] [] [x] [] [] [] [] [] [] [] Grooming [] [] [] [] [x] [] [] [] [] [] Personal Device Care [] [] [] [] [] [] [] [] [] [] Functional Mobility [] [] [] [] [] [] [] [] [] [] I=Independent, Mod I=Modified Independent, S=Supervision, SBA=Standby Assistance, CGA=Contact Guard Assistance,  
Min=Minimal Assistance, Mod=Moderate Assistance, Max=Maximal Assistance, Total=Total Assistance, NT=Not Tested MOBILITY: I Mod I S SBA CGA Min Mod Max Total  NT x2 Comments:  
Supine to sit [] [] [] [] [] [x] [] [] [] [] [] Sit to supine [] [] [] [] [x] [] [] [] [] [] [] Sit to stand [] [] [] [] [] [] [] [] [] [] [] declined Bed to chair [] [] [] [] [] [] [] [] [] [] [] declined I=Independent, Mod I=Modified Independent, S=Supervision, SBA=Standby Assistance, CGA=Contact Guard Assistance,  
Min=Minimal Assistance, Mod=Moderate Assistance, Max=Maximal Assistance, Total=Total Assistance, NT=Not Tested [facility] Daily Activity Inpatient Short Form How much help from another person does the patient currently need. .. Total A Lot A Little None 1. Putting on and taking off regular lower body clothing? [] 1   [x] 2   [] 3   [] 4  
2. Bathing (including washing, rinsing, drying)? [] 1   [x] 2   [] 3   [] 4  
3. Toileting, which includes using toilet, bedpan or urinal?   [] 1   [x] 2   [] 3   [] 4  
4. Putting on and taking off regular upper body clothing? [] 1   [] 2   [x] 3   [] 4  
5. Taking care of personal grooming such as brushing teeth? [] 1   [] 2   [x] 3   [] 4  
6. Eating meals? [] 1   [] 2   [] 3   [x] 4  
© 2007, Trustees of [address] [place] [address] [address] [address], under license to [employer]. All rights reserved Score:  Initial: 16 Most Recent: X (Date: -- ) Interpretation of Tool:  Represents activities that are increasingly more difficult (i.e. Bed mobility, Transfers, Gait).  
 
PLAN:  
FREQUENCY/DURATION: OT Plan of Care: 3 times/week for duration of hospital stay or until stated goals are met, whichever comes first. 
 
PROBLEM LIST:  
(Skilled intervention is medically necessary to address:) 1. Decreased ADL/Functional Activities 2. Decreased Activity Tolerance 3. Decreased Balance 4. Decreased Cognition 5. Decreased Strength 6. Decreased Transfer Abilities INTERVENTIONS PLANNED:  
(Benefits and precautions of occupational therapy have been discussed with the patient.) 1. Self Care Training 2. Therapeutic Activity 3. Therapeutic Exercise/HEP 4. Neuromuscular Re-education 5. Education TREATMENT:  
 
EVALUATION: Moderate Complexity : (Untimed Charge) TREATMENT:  
($$ Self Care/Home Management: 8-22 mins    ) Self Care (10 Minutes): Self care including Lower Body Dressing, Self Feeding and Grooming to increase independence. TREATMENT GRID: 
N/A 
 
AFTER TREATMENT POSITION/PRECAUTIONS: 
Bed, Needs within reach, RN notified and Visitors at bedside INTERDISCIPLINARY COLLABORATION: 
RN/PCT 
 
TOTAL TREATMENT DURATION: 
OT Patient Time In/Time Out Time In: 1071 Time Out: 1150 Zeyad Israel OT

## 2021-04-04 NOTE — PROGRESS NOTES
[facility] CARDIOLOGY PROGRESS NOTE 
      
 
4/4/2021 1:04 PM 
 
Admit Date: 3/31/2021 Subjective: The patient does not report angina, shortness of breath at rest, or palpitations. Patient with asymptomatic episodes of nonsustained SVT over the last 24h. ROS: 
Constitutional:   Negative for fevers and unexplained weight loss. Eyes:   Negative for visual disturbance. ENT:   Negative for significant hearing loss and tinnitus. Respiratory:   Negative for hemoptysis. Cardiovascular:   Negative except as noted in HPI. Gastrointestinal:   Negative for melena and abdominal pain. Genitourinary:   Negative for hematuria, renal stones. Integumentary:   Negative for rash or non-healing wounds Hematologic/Lymphatic:   Negative for excessive bleeding hx or clotting disorder. Musculoskeletal:  Negative for active, unexplained/severe joint pain. Neurological:   Negative for stroke. Behavioral/Psych:   Negative for suicidal ideations. Endocrine:   Negative for uncontrolled diabetic symptoms including polyuria, polydipsia and poor wound healing. Objective:  
  
Vitals:  
 04/03/21 2355 04/04/21 0400 04/04/21 0745 04/04/21 1138 BP: 103/61 123/68 118/67 120/60 Pulse: 79 61 70 71 Resp: 16 16 16 16 Temp: 97.5 °F (36.4 °C) 97.5 °F (36.4 °C) 98.6 °F (37 °C) 98.6 °F (37 °C) SpO2: 99% 98% 100% 99% Weight:      
Height:      
 
 
Physical Exam: 
General-No Acute Distress Neck- supple, no JVD 
CV- regular rate and rhythm no RG 
Lung- left lung basilar rales Abd- soft, nontender, nondistended Ext- no edema bilaterally. Skin- warm and dry Data Review:  
Recent Labs 04/04/21 
1606 04/03/21 
1702 04/03/21 
6547   144 143 144  
K 3.2*  3.2* 3.6 3.3*  
MG 1.9  --  2.0  
BUN 54*  54* 58* 59* CREA 3.22*  3.22* 3.39* 3.50* *  138* 153* 162* WBC 13.9*  --  14.2* HGB 8.2*  --  8.3* HCT 25.5*  --  26.1*  
PLT 89*  --  90* Assessment/Plan: Paroxysmal atrial fibrillation - currently in sinus rhythm 
- not on [address] 2/2 falls, as per primary cardiologist 
- patient with anemia and thrombocytopenia in the hospital (thrombocytopenia noted in the ED; less likely HIT) - consider LAAO as an outpatient if NOAC thought to be contraindicated 
- goal K>4 and Mg>2 within normal limits 
  
Hx of CABG 
- c/w ASA, statin, and BB 
  
Ischemic cardiomyopathy 
- EF 20-25% on most recent ECHO 
- convert metoprolol tartrate to succinate upon discharge 
- sCr 3.2: withhold ACEi/ARB/aldactone at this time 
- sCr improving off of diuretics 
  
Septic bursitis - persistent leukocytosis 
- mgmt per hospitalist/ID 
  
Pulmonary fibrosis 
- consider pulmonary consult 
  
Rales 
- consider repeat CXR with left lung crackles ARACELIS on CKD stage IV 
- sCr improved off diuretics 
- nephrology following Trevon Serrano MD

## 2021-04-04 NOTE — PROGRESS NOTES
Problem: Falls - Risk of 
Goal: *Absence of Falls Description: Document Vaniafrancheska Sibley Fall Risk and appropriate interventions in the flowsheet. Outcome: Progressing Towards Goal 
Note: Fall Risk Interventions: 
Mobility Interventions: Communicate number of staff needed for ambulation/transfer, Patient to call before getting OOB Mentation Interventions: Adequate sleep, hydration, pain control, Bed/chair exit alarm, Door open when patient unattended, Family/sitter at bedside, Familiar objects from home Medication Interventions: Teach patient to arise slowly, Bed/chair exit alarm, Patient to call before getting OOB Elimination Interventions: Patient to call for help with toileting needs, Stay With Me (per policy), Call light in reach History of Falls Interventions: Consult care management for discharge planning, Door open when patient unattended Problem: Patient Education: Go to Patient Education Activity Goal: Patient/Family Education Outcome: Progressing Towards Goal 
  
Problem: Pressure Injury - Risk of 
Goal: *Prevention of pressure injury Description: Document Jay Scale and appropriate interventions in the flowsheet. Outcome: Progressing Towards Goal 
Note: Pressure Injury Interventions: 
Sensory Interventions: Assess changes in LOC, Assess need for specialty bed Activity Interventions: Assess need for specialty bed, Increase time out of bed Mobility Interventions: PT/OT evaluation, Assess need for specialty bed, HOB 30 degrees or less Nutrition Interventions: Document food/fluid/supplement intake Friction and Shear Interventions: Apply protective barrier, creams and emollients, Lift sheet, Lift team/patient mobility team 
 
  
 
 
 
  
Problem: Patient Education: Go to Patient Education Activity Goal: Patient/Family Education Outcome: Progressing Towards Goal

## 2021-04-04 NOTE — PROGRESS NOTES
Pt resting in bed. Drowsy but arouses easy. No c/o pain or discomfort at this time.   Report will be given to oncoming RN

## 2021-04-04 NOTE — PROGRESS NOTES
[facility] Service History and Physical 
 
Patient ID: Gallo Martinez 
male 1940 
[id number] Admission Date: 3/31/2021 Chief Complaint: SOB, PACHECO Reason for Admission: ARACELIS on CKD ASSESSMENT & PLAN: 
 
#ARACELIS on CKD4 
- baseline Cr ~1.6, Cr on admission 5.16 
- likely cardiorenal, but patient not overtly hypervolemic 
- UA unremarkable - Urine Cr and Na ordered 
- RASTA unremarkable - euvolemic/hypovolemic on exam, although NT pro-BNP 19k, no previous BNP to compare - s/p 500cc bolus, will try Albumin + low dose Lasix overnight 
- cont sodium bicarb  
- consult nephrology for AM 
 
April 1: Creatinine improving with gentle hydration. Nephrology on board. 
-Fluid adjusted based on electrolyte abnormality. Potassium repleted. April 2: Creatinine improving with fluids. Nephrology on board. Potassium repleted. April 3: Creatinine improving. We will continue cautious hydration. Potassium repleted. Close monitoring. April 4: Improving. Continue current management. Potassium repleted. #Afib 
- HR  in Afib, EKG with Afib 
- trop 767->718 likely due to Atrial Fibrillation  
- trial low dose lopressor BID 
- keep K>4, Mg>2 - check Mg, replace with 20meq KCl  
- not on Eliquis due to fall risk - Lopressor IV prn for RVR 
- consult cardiology April 1: Cardiology on board. Already of Eliquis after discussion with cardiologist about risk and benefit. Continue AV trina blocking agent as needed. #Right knee cellulitis/inflammation - WBC 12.9, no clear source of infection - R knee XR with mild CPPD suspected - UA and CXR unremarkable - possibly related to pseudogout R knee vs steroid administration as below  
- not candidate for colchicine or NSAIDs due to ARACELIS on CKD 
- consider orthopedic surgery consult for injection April 1: Cannot rule out cellulitis and infection. Significant erythema with elevated procalcitonin. Will start patient on ceftriaxone and doxy. Patient also evaluated by Ortho and suspicious for prepatellar bursitis. April 2: I&D of prepatellar bursa showed significant amount of purulence. Infectious disease consulted. April 3: Patient currently on daptomycin and ceftriaxone. Culture showing heavy staph growth from wound. We will continue current antibiotics for now. April 4: S   aureus sensitive to cefazolin. Dose adjusted based on kidney function. #Adrenal Insufficiency  
- cont with home hydrocortisone, fludrocortisone #HFrEF 
- EF 25% on most recent ECHO 
- hypotension and orthostasis limiting HF medications 
- try on low dose lopressor BID for rate control, hold for SBP < 100 April 1: Appreciate cardiology recommendations. #CAD s/p CABG 
- cont ASA, statin 
- trial Lopressor BID April 1: Appreciate cardiology recommendation. #Acute on Chronic Anemia - , check B12/folate 
- Hgb 10, baseline 12-13 
- no obvious blood loss 
- transfuse for Hgb<8 in setting of CAD April 1: Hemoglobin 8.8. Anticipate further drop given patient looks slightly dehydrated on examination. -B12 level and folate level not low. We will check iron studies and TSH with next blood work-up. #GERD - cont PPI #Sacral ulcer: Examination deferred patient was evaluated in preop area. Wound consult placed. April 3: Small less than 1 cm skin tear in the gluteal area without any signs or symptoms of infection. Diet: renal  
VTE ppx: Heparin for DVT prophylaxis. GI ppx: PPI Patient's daughter Priscila Cao [[id number]] and wife was at bedside. Extensively discussed goals of care. DNR status verified. Patient and family does not want to get patient intubated even only in case of respiratory failure. So patient is DNR and DNI. April 2: Patient's daughter was present at bedside. Extensively discussed the plan with patient's daughter. She has already discussed hospice with patient's wife.   In next 24 to 48 hours, we will have goals of care discussion again based on clinical response and overall progress. Daughter understands that patient condition may deteriorate in spite of treatment and overall patient has multiple chronic life-threatening problems. April 3: Patient's daughter was present at bedside. Discussed all the labs and finding. Discussed heparin for DVT prophylaxis and family is in agreement. She verbalized understanding that patient has multiple chronic problem and his condition may deteriorate in spite of treatment. April 4: Patient's daughter at bedside. Discuss with the family about patient deconditioning. They are in agreement that patient should go to rehabilitation given patient lives his wife.  consult placed. With patient's daughter, as patient is not requiring oxygen, she is in agreement that patient can follow-up with pulmonary as outpatient if required. Will follow patient clinically to adjust his medication to improve his quality of life as per family wishes. HISTORY OF PRESENT ILLNESS: 
Patient is a 78 y.o. 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 intake and extreme weakness. He was found to have acute on chronic kidney injury and right knee cellulitis/deep tissue infection. April 1, 2021: Patient was evaluated by cardiology, nephrology and orthopedic surgeon. Patient is AO x3 with cues. Daughter and wife present bedside and contributed to the history. April 2: Status post I&D with drainage of purulence from right knee. Also right elbow was aspirated which has known hematoma and x-ray negative for fractures per family. Patient had delirium overnight with some anxiety.   Which improved with hydroxyzine. April 3: Patient looks more alert than yesterday. Oriented with cues. As per patient his pain has improved significantly. Overnight had delirium which did not require any significant medications. April 4: Looks much better and alert today. AO x3 with cues. Pain and range of motion of joint improving. Overnight, delirium is less as compared to previous days. Denies any chest pain, shortness of breath or palpitations. Rest review of system negative except mentioned above. PHYSICAL EXAM: 
 
Visit Vitals /60 (BP 1 Location: Left upper arm) Pulse 71 Temp 98.6 °F (37 °C) Resp 16 Ht 6' 3\" (1.905 m) Wt 77.1 kg (170 lb) SpO2 99% BMI 21.25 kg/m² General: No acute distress, speaking in full sentences, no use of accessory muscles HEENT: Pupils equal.  Extraocular muscle intact. Neck: Supple, no JVD Lungs: Bilateral lower lobe crackles otherwise clear to auscultate bilaterally Cardiovascular: Irregularly irregular with normal S1 and S2 Abdomen: Soft, nontender, nondistended, normoactive bowel sounds Extremities: No cyanosis clubbing. 1+ Edema. Neuro: Nonfocal, A&O x3 with cues. Psych: Mild cognitive impairment noted. Skin: Dressing on right knee and left elbow [intact for surgical site exam to surgery]. April 3, 2021: Small, 1 cm skin tear without any signs or infection on right gluteal area. Procedures done this admission: 
Procedure(s): RIGHT KNEE INCISION AND DRAINAGE/RIGHT ELBOW WOUND FLUID ASPIRATION All Micro Results Procedure Component Value Units Date/Time CULTURE, BLOOD [[id number]] Collected: 03/31/21 2135 Order Status: Completed Specimen: Blood Updated: 04/04/21 2662 Special Requests: --     
  RIGHT 
HAND Culture result: NO GROWTH 4 DAYS     
 CULTURE, BODY FLUID Vear Chai STAIN [[id number]]  (Abnormal)  (Susceptibility) Collected: 04/02/21 1039 Order Status: Completed Specimen: Knee  Updated: 04/04/21 7322 Special Requests: --     
  RIGHT PATELLA PUS 
  
  GRAM STAIN    
  WBCS SEEN TOO NUMEROUS TO COUNT MODERATE GRAM POSITIVE COCCI Culture result: MODERATE STAPHYLOCOCCUS AUREUS  
     
 CULTURE, BODY FLUID W Karri Kempa [[id number]]  (Abnormal)  (Susceptibility) Collected: 04/02/21 1045 Order Status: Completed Specimen: Elbow Updated: 04/04/21 0936 Special Requests: RIGHT     
  GRAM STAIN    
  WBCS SEEN TOO NUMEROUS TO COUNT MODERATE GRAM POSITIVE COCCI Culture result:    
  HEAVY STAPHYLOCOCCUS AUREUS  
     
 CULTURE, ANAEROBIC [[id number]] Collected: 04/02/21 1045 Order Status: Completed Updated: 04/02/21 1445 Dow Barrier [[id number]] Collected: 04/02/21 1039 Order Status: Completed Updated: 04/02/21 1439 CULTURE, Mardell Phalen STAIN [[id number]] Order Status: Sent Specimen: Wound from Knee CULTURE, ANAEROBIC [[id number]] Order Status: Sent Specimen: Surgical Specimen Dotty Ross STAIN [[id number]] Collected: 04/02/21 1045 Order Status: Canceled Specimen: Wound from Elbow SARS-CoV-2 Lab Results \"Novel Coronavirus\" Test: No results found for: COV2NT \"Emergent Disease\" Test: No results found for: EDPR \"SARS-COV-2\" Test: No results found for: XGCOVT \"Precision Labs\" Test: No results found for: RSLT Rapid Test: No results found for: COVR Labs: Results:  
   
BMP, Mg, Phos Recent Labs 04/04/21 
5079 04/03/21 
1702 04/03/21 
6278 04/02/21 
0535 04/02/21 
2349   144 143 144   < > 145  
K 3.2*  3.2* 3.6 3.3*   < > 2.5*  
*  119* 119* 119*   < > 118* CO2 14*  14* 14* 13*   < > 15* AGAP 11  11 10 12   < > 12 BUN 54*  54* 58* 59*   < > 71* CREA 3.22*  3.22* 3.39* 3.50*   < > 4.18* CA 7.8*  7.8* 8.0* 7.7*   < > 7.7*  
*  138* 153* 162*   < > 160* MG 1.9  --  2.0  --  1.6* PHOS 3.2  --   --   --   --   
 < > = values in this interval not displayed. CBC Recent Labs 04/04/21 
1948 04/03/21 
1774 04/02/21 
8274 WBC 13.9* 14.2* 13.0*  
RBC 2.40* 2.47* 2.47* HGB 8.2* 8.3* 8.3* HCT 25.5* 26.1* 26.2*  
PLT 89* 90* 95* GRANS 90* 91* 90* LYMPH 2* 2* 2* EOS 1 0* 2 MONOS 5 4 5 BASOS 0 0 0 IG 3 2 2 ANEU 12.4* 12.9* 11.7* ABL 0.3* 0.3* 0.3* MARIA R 0.1 0.0 0.2 ABM 0.7 0.6 0.6 ABB 0.0 0.0 0.0 AIG 0.4 0.3 0.3 LFT Recent Labs 04/04/21 
1422 04/03/21 
2601 ALT  --  20  
AP  --  62  
TP  --  4.8* ALB 1.9* 2.1*  
GLOB  --  2.7 AGRAT  --  0.8* Cardiac Testing Lab Results Component Value Date/Time CK 58 10/20/2014 07:12 PM  
 Troponin-I, Qt. <0.02 (L) 10/20/2014 07:12 PM  
  
Coagulation Tests Lab Results Component Value Date/Time Prothrombin time 10.5 05/04/2015 11:03 AM  
 INR 1.0 05/04/2015 11:03 AM  
  
A1c No results found for: HBA1C, HGBE8, JBN0DDKD, TCN9FMIG Lipid Panel No results found for: CHOL, CHOLPOCT, CHOLX, CHLST, CHOLV, 157885, HDL, HDLP, LDL, LDLC, DLDLP, 160810, VLDLC, VLDL, TGLX, TRIGL, TRIGP, TGLPOCT, CHHD, CHHDX Thyroid Panel Lab Results Component Value Date/Time TSH 0.741 04/02/2021 05:35 AM  
 T4, Free 0.8 04/02/2021 05:35 AM  
    
Most Recent UA Lab Results Component Value Date/Time  Color YELLOW 04/01/2021 10:00 AM  
 Appearance CLEAR 04/01/2021 10:00 AM  
 Specific gravity 1.017 10/20/2014 08:20 PM  
 pH (UA) 5.5 04/01/2021 10:00 AM  
 Protein Negative 04/01/2021 10:00 AM  
 Glucose Negative 04/01/2021 10:00 AM  
 Ketone Negative 04/01/2021 10:00 AM  
 Bilirubin Negative 04/01/2021 10:00 AM  
 Blood Negative 04/01/2021 10:00 AM  
 Urobilinogen 0.2 04/01/2021 10:00 AM  
 Nitrites Negative 04/01/2021 10:00 AM  
 Leukocyte Esterase Negative 04/01/2021 10:00 AM  
 WBC 0-3 03/31/2021 05:55 PM  
 RBC 10-20 03/31/2021 05:55 PM  
 Epithelial cells 0-3 03/31/2021 05:55 PM  
 Bacteria 0 03/31/2021 05:55 PM  
 Casts 0-3 03/31/2021 05:55 PM  
  
 
 
 
Active Problems: 
 
Patient Active Problem List  
 Diagnosis Date Noted  Pulmonary interstitial fibrosis ([facility] [address] 04/01/2021  PAF (paroxysmal atrial fibrillation) ([facility] [address] 04/01/2021  ARACELIS (acute kidney injury) ([facility] [address] 03/31/2021  Atrial fibrillation ([facility] [address] 03/31/2021  Chronic renal failure, stage 4 (severe) ([facility] [address] 11/12/2020  Anemia 11/12/2020  Ischemic cardiomyopathy 11/12/2020  Abnormal cardiovascular stress test 02/27/2020  LV dysfunction 02/03/2020  Atherosclerosis of native coronary artery of native heart without angina pectoris 12/26/2019  Adrenal insufficiency due to cancer therapy ([facility] [address] 12/26/2019  Hypomagnesemia 10/22/2014  Syncope 10/21/2014  Orthostatic hypotension 10/21/2014  History of ileostomy 10/21/2014  Dehydration 10/21/2014  Anorexia 10/21/2014  Weakness generalized 10/21/2014  Fatigue 10/21/2014  Malaise 10/21/2014  S/P CABG x 4 10/21/2014  Hypertension 10/21/2014  History of ulcerative colitis 10/21/2014  Acute renal failure ([address] 10/20/2014 Roberta Hay MD 
[facility] Hospitalist Service 4/4/2021

## 2021-04-05 NOTE — PROGRESS NOTES
ACUTE PHYSICAL THERAPY GOALS: 
(Developed with and agreed upon by patient and/or caregiver. ) LTG: 
(1.)Mr. Quach will move from supine to sit and sit to supine , scoot up and down and roll side to side in bed with STAND BY ASSIST within 7 treatment day(s). (2.)Mr. Quach will transfer from bed to chair and chair to bed with STAND BY ASSIST using the least restrictive device within 7 treatment day(s). (3.)Mr. Quach will ambulate with CONTACT GUARD ASSIST for 150+ feet with the least restrictive device within 7 treatment day(s). PHYSICAL THERAPY: Daily Note and PM Treatment Day # 2 Blessing Wynn is a 78 y.o. male PRIMARY DIAGNOSIS: ARACELIS (acute kidney injury) ([facility] [address] ARACELIS (acute kidney injury) ([facility] [address] [N17.9] Procedure(s) (LRB): 
RIGHT KNEE INCISION AND DRAINAGE/RIGHT ELBOW WOUND FLUID ASPIRATION (Right) 3 Days Post-Op ASSESSMENT:  
 
REHAB RECOMMENDATIONS: CURRENT LEVEL OF FUNCTION: 
(Most Recently Demonstrated) Recommendation to date pending progress: 
Setting:  Short-term Rehab Equipment:  To Be Determined Bed Mobility:  Minimal Assistance Sit to Stand:  Minimal Assistance from an elevated bed Transfers:  Not tested Gait/Mobility:  Not tested ASSESSMENT: 
Mr. Iraida Alanis was supine with wife present and ready to participate. He performed supine exercises with good effort and ability. He sat up to the EOB with minimal assist.  He stood from an elevated bed into the walker with minimal assist but became immediately dizzy. After a short rest he stood again and walked along EOB. He continues to become orthostatic to the point mobility on his feet is not able. Will continue efforts. BP listed below SUBJECTIVE:  
Mr. Iraida Alanis states, \"ok. \" SOCIAL HISTORY/ LIVING ENVIRONMENT:  
Home Environment: Private residence One/Two Story Residence: One story Living Alone: Yes Support Systems: Child(delfino), Family member(s), Spouse/Significant Other/Partner OBJECTIVE:  
 
PAIN: VITAL SIGNS: LINES/DRAINS:  
Pre Treatment:   
Post Treatment: 0 Additional Blood Pressure/Pulse Data 
BP 2: 123/65 MAP 2 (Calculated): 84 
Patient Position 2: Sitting BP 3: (!) 88/44 MAP 3 (Calculated): (!) 59 Patient Position 3: Sitting(after stand attempt) IV 
O2 Device: Room air MOBILITY: I Mod I S SBA CGA Min Mod Max Total  NT x2 Comments:  
Bed Mobility Rolling [] [] [] [] [] [] [] [] [] [] [] Supine to Sit [] [] [] [] [] [x] [] [] [] [] [] Scooting [] [] [x] [] [] [] [] [] [] [] [] Sit to Supine [] [] [] [] [] [x] [] [] [] [] []   
Transfers Sit to Stand [] [] [] [] [] [x] [] [] [] [] [] Bed to Chair [] [] [] [] [] [] [] [] [] [] []   
Stand to Sit [] [] [] [] [x] [] [] [] [] [] [] I=Independent, Mod I=Modified Independent, S=Supervision, SBA=Standby Assistance, CGA=Contact Guard Assistance,  
Min=Minimal Assistance, Mod=Moderate Assistance, Max=Maximal Assistance, Total=Total Assistance, NT=Not Tested BALANCE: Good Fair+ Fair Fair- Poor NT Comments Sitting Static [x] [] [] [] [] [] Sitting Dynamic [x] [] [] [] [] []   
         
Standing Static [] [] [x] [x] [] []   
Standing Dynamic [] [] [] [x] [] [] GAIT: I Mod I S SBA CGA Min Mod Max Total  NT x2 Comments:  
Level of Assistance [] [] [] [] [] [] [] [] [] [] [] Distance NA   
DME Rolling Luz Jensen Gait Quality NA Weightbearing Status N/A I=Independent, Mod I=Modified Independent, S=Supervision, SBA=Standby Assistance, CGA=Contact Guard Assistance,  
Min=Minimal Assistance, Mod=Moderate Assistance, Max=Maximal Assistance, Total=Total Assistance, NT=Not Tested PLAN:  
FREQUENCY/DURATION: PT Plan of Care: 3 times/week for duration of hospital stay or until stated goals are met, whichever comes first. 
TREATMENT:  
 
TREATMENT:  
($$ Therapeutic Activity: 8-22 mins 
$$ Therapeutic Exercises: 8-22 mins    ) Therapeutic Activity (15 Minutes):  Therapeutic activity included Supine to Sit, Sit to Supine, Scooting and Transfer Training to improve functional Mobility, Strength and Activity tolerance. Therapeutic Exercise (15 Minutes): Therapeutic exercises noted below to improve functional activity tolerance, AROM, strength and mobility. TREATMENT GRID: 
 Date: 
4/5/21 Date: 
 Date: Activity/Exercise Parameters Parameters Parameters Supine AP 20x B Supine heel slides 10x B Supine SAQ 10x B Supine hip abd 10x B    
     
     
     
 
 
 
AFTER TREATMENT POSITION/PRECAUTIONS: 
Bed, Needs within reach, RN notified and Visitors at bedside INTERDISCIPLINARY COLLABORATION: 
RN/PCT and PT/PTA TOTAL TREATMENT DURATION: 
PT Patient Time In/Time Out Time In: 1430 Time Out: 1500 Marquita Manuel PTA

## 2021-04-05 NOTE — PROGRESS NOTES
Infectious Disease Progress Note Today's Date: 2021 Admit Date: 3/31/2021 Impression: · MSSA R knee prepatellar bursitis, s/p washout , op cultures MSSA (clinda-R). 3/31 BC negative · MSSA  elbow olecranon bursitis, s/p aspiration , MSSA · Acute on CKD 4 Plan:  
· Continue Cefazolin renal dosed while admitted · Could transition to Cefadroxil 500mg PO BID at time of discharge to complete total 21 days from debridement EOT 21 Anti-infectives: · Cefazolin - Subjective:  
Seen with daughter at bedside. Reports mild to moderate pain. ID treatment plan reviewed with patient and daughter. Allergies Allergen Reactions  Levaquin [Levofloxacin] Other (comments) Acute renal failure. Review of Systems:  Pertinent items are noted in the History of Present Illness. Objective:  
 
Visit Vitals /62 (BP 1 Location: Left upper arm, BP Patient Position: At rest) Pulse 64 Temp 97.4 °F (36.3 °C) Resp 18 Ht 6' 3\" (1.905 m) Wt 77.1 kg (170 lb) SpO2 95% BMI 21.25 kg/m² Temp (24hrs), Av °F (36.7 °C), Min:97.4 °F (36.3 °C), Max:98.6 °F (37 °C) Patient examined 2021, exam remains unchanged except as noted below: 
 
General:  Alert, cooperative, no acute distress Head:  Normocephalic, atraumatic Eyes:  Anicteric, no drainage/not injected Throat: Mucus membranes moist OP clear Neck: Supple, symmetrical, trachea midline, no JVD Lungs:   Clear throughout lung fields, no increased work of breathing Heart:  Irregular rate and rhythm, without murmur Abdomen:   Soft, non-tender, no guarding, no distention, bowel sounds active Extremities: R arm with edema, Ace wrap over elbow. R knee with ace wrap: + edema Skin: Skin without rash, very thin Lines/Devices: PIV Data Review: CBC: 
Recent Labs 21 
9191 21 
9294 WBC 13.9* 14.2*  
GRANS 90* 91* MONOS 5 4 EOS 1 0* ANEU 12.4* 12.9* ABL 0.3* 0.3* HGB 8.2* 8.3* HCT 25.5* 26.1*  
PLT 89* 90* BMP: 
Recent Labs 04/05/21 
4194 04/04/21 
4470 04/03/21 
1702 CREA 2.94* 3.22*  3.22* 3.39* BUN 48* 54*  54* 58* * 144  144 143  
K 3.7 3.2*  3.2* 3.6 * 119*  119* 119* CO2 13* 14*  14* 14* AGAP 11 11  11 10 * 138*  138* 153* LFTS: 
Recent Labs 04/05/21 
8559 04/04/21 
3194 04/03/21 
5182 TBILI  --   --  0.3 ALT  --   --  20  
AP  --   --  62  
TP  --   --  4.8* ALB 1.9* 1.9* 2.1* Microbiology:  
 
All Micro Results Procedure Component Value Units Date/Time CULTURE, BLOOD [[id number]] Collected: 03/31/21 2135 Order Status: Completed Specimen: Blood Updated: 04/05/21 0703 Special Requests: --     
  RIGHT 
HAND Culture result: NO GROWTH 5 DAYS     
 CULTURE, BODY FLUID Jeannetta Cruise STAIN [[id number]]  (Abnormal)  (Susceptibility) Collected: 04/02/21 1039 Order Status: Completed Specimen: Knee  Updated: 04/04/21 7476 Special Requests: --     
  RIGHT PATELLA PUS 
  
  GRAM STAIN    
  WBCS SEEN TOO NUMEROUS TO COUNT MODERATE GRAM POSITIVE COCCI Culture result: MODERATE STAPHYLOCOCCUS AUREUS  
     
 CULTURE, BODY FLUID W Verde Nora [[id number]]  (Abnormal)  (Susceptibility) Collected: 04/02/21 1045 Order Status: Completed Specimen: Elbow Updated: 04/04/21 0771 Special Requests: RIGHT     
  GRAM STAIN    
  WBCS SEEN TOO NUMEROUS TO COUNT MODERATE GRAM POSITIVE COCCI Culture result:    
  HEAVY STAPHYLOCOCCUS AUREUS  
     
 CULTURE, ANAEROBIC [[id number]] Collected: 04/02/21 1045 Order Status: Completed Updated: 04/02/21 1445 Patti Verma [[id number]] Collected: 04/02/21 1039 Order Status: Completed Updated: 04/02/21 1439 Ese Rout STAIN [[id number]] Collected: 04/02/21 1215 Order Status: Canceled Specimen: Wound from Knee  CULTURE, ANAEROBIC [[id number]] Collected: 04/02/21 1115 Order Status: Canceled Specimen: Surgical Specimen Kalina Jhaveri STAIN [[id number]] Collected: 04/02/21 1045 Order Status: Canceled Specimen: Wound from Elbow Imaging: As noted Signed By: Marian Velazquez NP April 5, 2021

## 2021-04-05 NOTE — PROGRESS NOTES
[facility] Subjective: A on CKD Patient seen and examined on rounds, daughter at the bedside, no new complaints, good uop. Review of Systems -  
General: no fever/chills, appetite ok CV: No CP Lung: no SOB, no cough GI: no N/V, ostomy output at baseline : no dysuria Ext: no edema Objective: 
 
Vitals:  
 04/04/21 1947 04/05/21 0022 04/05/21 0500 04/05/21 0732 BP: (!) 117/58 119/62 123/67 133/62 Pulse: 73 87 77 64 Resp: 17 18 18 18 Temp: 97.6 °F (36.4 °C) 97.7 °F (36.5 °C) 98 °F (36.7 °C) 97.4 °F (36.3 °C) SpO2: 98% 99% 98% 95% Weight:      
Height:      
 
 
PE 
Gen: in no acute distress, alert and oriented CV:reg rate, S1, S2, no Rub Chest:clear bilaterally Abd: soft, non tender, + BS, + ostomy Ext/Access: no edema Maribel Heman LAB Recent Labs 04/04/21 
7132 04/03/21 
5555 WBC 13.9* 14.2* HGB 8.2* 8.3* HCT 25.5* 26.1*  
PLT 89* 90* Recent Labs 04/05/21 
0579 04/04/21 
6300 04/03/21 
1702 04/03/21 
0928 * 144  144 143 144  
K 3.7 3.2*  3.2* 3.6 3.3*  
* 119*  119* 119* 119* CO2 13* 14*  14* 14* 13* * 138*  138* 153* 162* BUN 48* 54*  54* 58* 59* CREA 2.94* 3.22*  3.22* 3.39* 3.50* MG  --  1.9  --  2.0 PHOS 2.2* 3.2  --   --   
CA 7.8* 7.8*  7.8* 8.0* 7.7* ALB 1.9* 1.9*  --  2.1* TBILI  --   --   --  0.3 ALT  --   --   --  20 Radiology A/P:  
Patient Active Problem List  
Diagnosis Code  Acute renal failure (HCC) N17.9  Syncope R55  Orthostatic hypotension I95.1  History of ileostomy Z98.890  
 Dehydration E86.0  Anorexia R63.0  Weakness generalized R53.1  Fatigue R53.83  
 Malaise R53.81  
 S/P CABG x 4 Z95.1  Hypertension I10  
 History of ulcerative colitis Z87.19  
 Hypomagnesemia E83.42  
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  
 Chronic renal failure, stage 4 (severe) ([facility]) N18.4  Anemia D64.9  
 Ischemic cardiomyopathy I25.5  ARACELIS (acute kidney injury) ([facility] [address] N17.9  Atrial fibrillation (HCC) I48.91  
 Pulmonary interstitial fibrosis (HCC) J84.10  PAF (paroxysmal atrial fibrillation) ([facility]) I48.0 A on CKD4 - renal function improving, non oliguric, continue gentle IVF  
hyperNa mild DW Dr. Kaitlyn Viera Pulm Fibrosis Thrombocytopenia Septic Bursitis - s/p I &D, abx per primary Orthostatic Hypotension Adrenal Insufficiency on steroids Martha Rizzo, NP

## 2021-04-05 NOTE — PROGRESS NOTES
[facility] Service History and Physical 
 
Patient ID: Aubrey Buitrago 
male 1940 
[id number] Admission Date: 3/31/2021 Chief Complaint: SOB, PACHECO Reason for Admission: ARACELIS on CKD ASSESSMENT & PLAN: 
 
#ARACELIS on CKD4 
- baseline Cr ~1.6, Cr on admission 5.16 
- likely cardiorenal, but patient not overtly hypervolemic 
- UA unremarkable - Urine Cr and Na ordered 
- RASTA unremarkable - euvolemic/hypovolemic on exam, although NT pro-BNP 19k, no previous BNP to compare - s/p 500cc bolus, will try Albumin + low dose Lasix overnight 
- cont sodium bicarb  
- consult nephrology for AM 
 
April 1: Creatinine improving with gentle hydration. Nephrology on board. 
-Fluid adjusted based on electrolyte abnormality. Potassium repleted. April 2: Creatinine improving with fluids. Nephrology on board. Potassium repleted. April 3: Creatinine improving. We will continue cautious hydration. Potassium repleted. Close monitoring. April 4: Improving. Continue current management. Potassium repleted. April 5: Creatinine improving. Potassium repleted. Continue current management. #Afib 
- HR  in Afib, EKG with Afib 
- trop [id number]->718 likely due to Atrial Fibrillation  
- trial low dose lopressor BID 
- keep K>4, Mg>2 - check Mg, replace with 20meq KCl  
- not on Eliquis due to fall risk - Lopressor IV prn for RVR 
- consult cardiology April 1: Cardiology on board. Already of Eliquis after discussion with cardiologist about risk and benefit. Continue AV trina blocking agent as needed. #Right knee cellulitis/inflammation - WBC 12.9, no clear source of infection - R knee XR with mild CPPD suspected - UA and CXR unremarkable - possibly related to pseudogout R knee vs steroid administration as below  
- not candidate for colchicine or NSAIDs due to ARACELIS on CKD 
- consider orthopedic surgery consult for injection April 1: Cannot rule out cellulitis and infection.   Significant erythema with elevated procalcitonin. Will start patient on ceftriaxone and doxy. Patient also evaluated by Ortho and suspicious for prepatellar bursitis. April 2: I&D of prepatellar bursa showed significant amount of purulence. Infectious disease consulted. April 3: Patient currently on daptomycin and ceftriaxone. Culture showing heavy staph growth from wound. We will continue current antibiotics for now. April 4:   aureus sensitive to cefazolin. Dose adjusted based on kidney function. #Adrenal Insufficiency  
- cont with home hydrocortisone, fludrocortisone #HFrEF 
- EF 25% on most recent ECHO 
- hypotension and orthostasis limiting HF medications 
- try on low dose lopressor BID for rate control, hold for SBP < 100 April 1: Appreciate cardiology recommendations. #CAD s/p CABG 
- cont ASA, statin 
- trial Lopressor BID April 1: Appreciate cardiology recommendation. #Acute on Chronic Anemia - , check B12/folate 
- Hgb 10, baseline 12-13 
- no obvious blood loss 
- transfuse for Hgb<8 in setting of CAD April 1: Hemoglobin 8.8. Anticipate further drop given patient looks slightly dehydrated on examination. -B12 level and folate level not low. We will check iron studies and TSH with next blood work-up. #GERD - cont PPI #Sacral ulcer: Examination deferred patient was evaluated in preop area. Wound consult placed. April 3: Small less than 1 cm skin tear in the gluteal area without any signs or symptoms of infection. Diet: renal  
VTE ppx: Heparin for DVT prophylaxis. GI ppx: PPI Patient's daughter Downey [[id number]] and wife was at bedside. Extensively discussed goals of care. DNR status verified. Patient and family does not want to get patient intubated even only in case of respiratory failure. So patient is DNR and DNI. April 2: Patient's daughter was present at bedside. Extensively discussed the plan with patient's daughter.   She has already discussed hospice with patient's wife. In next 24 to 48 hours, we will have goals of care discussion again based on clinical response and overall progress. Daughter understands that patient condition may deteriorate in spite of treatment and overall patient has multiple chronic life-threatening problems. April 3: Patient's daughter was present at bedside. Discussed all the labs and finding. Discussed heparin for DVT prophylaxis and family is in agreement. She verbalized understanding that patient has multiple chronic problem and his condition may deteriorate in spite of treatment. April 4: Patient's daughter at bedside. Discuss with the family about patient deconditioning. They are in agreement that patient should go to rehabilitation given patient lives his wife.  consult placed. With patient's daughter, as patient is not requiring oxygen, she is in agreement that patient can follow-up with pulmonary as outpatient if required. Will follow patient clinically to adjust his medication to improve his quality of life as per family wishes. April 5: Again daughter was present at bedside. Patient has slight delirium overnight. Management plan discussed with patient's daughter and patient. Will work on rehabilitation place. Continue hydration for now as creatinine improving and patient is not in respiratory distress. HISTORY OF PRESENT ILLNESS: 
Patient is a 78 y.o. 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 intake and extreme weakness. He was found to have acute on chronic kidney injury and right knee cellulitis/deep tissue infection. April 1, 2021: Patient was evaluated by cardiology, nephrology and orthopedic surgeon. Patient is AO x3 with cues. Daughter and wife present bedside and contributed to the history. April 2: Status post I&D with drainage of purulence from right knee. Also right elbow was aspirated which has known hematoma and x-ray negative for fractures per family. Patient had delirium overnight with some anxiety. Which improved with hydroxyzine. April 3: Patient looks more alert than yesterday. Oriented with cues. As per patient his pain has improved significantly. Overnight had delirium which did not require any significant medications. April 4: Looks much better and alert today. AO x3 with cues. Pain and range of motion of joint improving. Overnight, delirium is less as compared to previous days. Denies any chest pain, shortness of breath or palpitations. Rest review of system negative except mentioned above. April 5: More alert. AOx3 with cues. Pain improving. Had delirium/confusion overnight but no vegetation. Denies any chest pain, shortness of breath, palpitations or other complaint. PHYSICAL EXAM: 
 
Visit Vitals /68 (BP 1 Location: Left upper arm, BP Patient Position: At rest) Pulse 62 Temp 97.5 °F (36.4 °C) Resp 18 Ht 6' 3\" (1.905 m) Wt 77.1 kg (170 lb) SpO2 99% BMI 21.25 kg/m² General: No acute distress, speaking in full sentences, no use of accessory muscles HEENT: Pupils equal.  Extraocular muscle intact. Neck: Supple, no JVD Lungs: Bilateral lower lobe crackles otherwise clear to auscultate bilaterally Cardiovascular: Irregularly irregular with normal S1 and S2 Abdomen: Soft, nontender, nondistended, normoactive bowel sounds Extremities: No cyanosis clubbing. 1+ Edema. Neuro: Nonfocal, A&O x3 with cues. Psych: Mild cognitive impairment noted. Skin: Dressing on right knee and left elbow [intact for surgical site exam to surgery].   April53, 2021: Small, 1 cm skin tear without any signs or infection on right gluteal area. Procedures done this admission: 
Procedure(s): RIGHT KNEE INCISION AND DRAINAGE/RIGHT ELBOW WOUND FLUID ASPIRATION All [employer] Results Procedure Component Value Units Date/Time Jada Rodney [[id number]] Collected: 04/02/21 1039 Order Status: Completed Specimen: Knee  Updated: 04/05/21 8772 Special Requests: --     
  RIGHT PATELLA PUS Culture result: SUBCULTURE IS NECESSARY TO DETERMINE PRESENCE OR ABSENCE OF ANAEROBIC BACTERIA IN THIS CULTURE. FURTHER REPORT TO FOLLOW AFTER INCUBATION OF SUBCULTURE. Jada Rashuan [[id number]] Collected: 04/02/21 1045 Order Status: Completed Specimen: Elbow Updated: 04/05/21 3856 Special Requests: RIGHT Culture result: SUBCULTURE IS NECESSARY TO DETERMINE PRESENCE OR ABSENCE OF ANAEROBIC BACTERIA IN THIS CULTURE. FURTHER REPORT TO FOLLOW AFTER INCUBATION OF SUBCULTURE. CULTURE, BLOOD [[id number]] Collected: 03/31/21 2135 Order Status: Completed Specimen: Blood Updated: 04/05/21 0703 Special Requests: --     
  RIGHT 
HAND Culture result: NO GROWTH 5 DAYS     
 CULTURE, BODY FLUID Orpah Keeley STAIN [[id number]]  (Abnormal)  (Susceptibility) Collected: 04/02/21 1039 Order Status: Completed Specimen: Knee  Updated: 04/04/21 5100 Special Requests: --     
  RIGHT PATELLA PUS 
  
  GRAM STAIN    
  WBCS SEEN TOO NUMEROUS TO COUNT MODERATE GRAM POSITIVE COCCI Culture result: MODERATE STAPHYLOCOCCUS AUREUS  
     
 CULTURE, BODY FLUID [address] [[id number]]  (Abnormal)  (Susceptibility) Collected: 04/02/21 1045 Order Status: Completed Specimen: Elbow Updated: 04/04/21 1649 Special Requests: RIGHT     
  GRAM STAIN    
  WBCS SEEN TOO NUMEROUS TO COUNT MODERATE GRAM POSITIVE COCCI   Culture result:    
  HEAVY STAPHYLOCOCCUS AUREUS  
     
 CULTURE, Berl Kvng STAIN [[id number]] Collected: 04/02/21 1215  
 Order Status: Canceled Specimen: Wound from Knee CULTURE, ANAEROBIC [[id number]] Collected: 04/02/21 1115 Order Status: Canceled Specimen: Surgical Specimen Tessa Garcia STAIN [[id number]] Collected: 04/02/21 1045 Order Status: Canceled Specimen: Wound from Elbow SARS-CoV-2 Lab Results \"Novel Coronavirus\" Test: No results found for: COV2NT \"Emergent Disease\" Test: No results found for: EDPR \"SARS-COV-2\" Test: No results found for: XGCOVT \"Precision Labs\" Test: No results found for: RSLT Rapid Test: No results found for: COVR Labs: Results:  
   
BMP, Mg, Phos Recent Labs 04/05/21 
5775 04/04/21 
2696 04/03/21 
1702 04/03/21 
0232 * 144  144 143 144  
K 3.7 3.2*  3.2* 3.6 3.3*  
* 119*  119* 119* 119* CO2 13* 14*  14* 14* 13* AGAP 11 11  11 10 12 BUN 48* 54*  54* 58* 59* CREA 2.94* 3.22*  3.22* 3.39* 3.50* CA 7.8* 7.8*  7.8* 8.0* 7.7*  
* 138*  138* 153* 162* MG  --  1.9  --  2.0 PHOS 2.2* 3.2  --   --   
  
CBC Recent Labs 04/04/21 
4267 04/03/21 
6043 WBC 13.9* 14.2*  
RBC 2.40* 2.47* HGB 8.2* 8.3* HCT 25.5* 26.1*  
PLT 89* 90* GRANS 90* 91* LYMPH 2* 2* EOS 1 0* MONOS 5 4  
BASOS 0 0 IG 3 2 ANEU 12.4* 12.9* ABL 0.3* 0.3* MARIA R 0.1 0.0 ABM 0.7 0.6 ABB 0.0 0.0 AIG 0.4 0.3 LFT Recent Labs 04/05/21 
0122 04/04/21 
5328 04/03/21 
9437 ALT  --   --  20  
AP  --   --  62  
TP  --   --  4.8* ALB 1.9* 1.9* 2.1*  
GLOB  --   --  2.7 AGRAT  --   --  0.8* Cardiac Testing Lab Results Component Value Date/Time CK 58 10/20/2014 07:12 PM  
 Troponin-I, Qt. <0.02 (L) 10/20/2014 07:12 PM  
  
Coagulation Tests Lab Results Component Value Date/Time Prothrombin time 10.5 05/04/2015 11:03 AM  
 INR 1.0 05/04/2015 11:03 AM  
  
A1c No results found for: HBA1C, HGBE8, PKQ4NBVB, RKQ0KQLG Lipid Panel No results found for: CHOL, CHOLPOCT, CHOLX, CHLST, CHOLV, 875699, HDL, HDLP, LDL, LDLC, DLDLP, 155102, VLDLC, VLDL, TGLX, TRIGL, TRIGP, TGLPOCT, CHHD, CHHDX Thyroid Panel Lab Results Component Value Date/Time TSH 0.741 04/02/2021 05:35 AM  
 T4, Free 0.8 04/02/2021 05:35 AM  
    
Most Recent UA Lab Results Component Value Date/Time Color YELLOW 04/01/2021 10:00 AM  
 Appearance CLEAR 04/01/2021 10:00 AM  
 Specific gravity 1.017 10/20/2014 08:20 PM  
 pH (UA) 5.5 04/01/2021 10:00 AM  
 Protein Negative 04/01/2021 10:00 AM  
 Glucose Negative 04/01/2021 10:00 AM  
 Ketone Negative 04/01/2021 10:00 AM  
 Bilirubin Negative 04/01/2021 10:00 AM  
 Blood Negative 04/01/2021 10:00 AM  
 Urobilinogen 0.2 04/01/2021 10:00 AM  
 Nitrites Negative 04/01/2021 10:00 AM  
 Leukocyte Esterase Negative 04/01/2021 10:00 AM  
 WBC 0-3 03/31/2021 05:55 PM  
 RBC 10-20 03/31/2021 05:55 PM  
 Epithelial cells 0-3 03/31/2021 05:55 PM  
 Bacteria 0 03/31/2021 05:55 PM  
 Casts 0-3 03/31/2021 05:55 PM  
  
 
 
 
Active Problems: 
 
Patient Active Problem List  
 Diagnosis Date Noted  Pulmonary interstitial fibrosis ([address] 04/01/2021  PAF (paroxysmal atrial fibrillation) ([facility] [address] 04/01/2021  ARACELIS (acute kidney injury) ([address] 03/31/2021  Atrial fibrillation ([address] 03/31/2021  Chronic renal failure, stage 4 (severe) ([address] 11/12/2020  Anemia 11/12/2020  Ischemic cardiomyopathy 11/12/2020  Abnormal cardiovascular stress test 02/27/2020  LV dysfunction 02/03/2020  Atherosclerosis of native coronary artery of native heart without angina pectoris 12/26/2019  Adrenal insufficiency due to cancer therapy ([address] 12/26/2019  Hypomagnesemia 10/22/2014  Syncope 10/21/2014  Orthostatic hypotension 10/21/2014  History of ileostomy 10/21/2014  Dehydration 10/21/2014  Anorexia 10/21/2014  Weakness generalized 10/21/2014  Fatigue 10/21/2014  Malaise 10/21/2014  S/P CABG x 4 10/21/2014  Hypertension 10/21/2014  History of ulcerative colitis 10/21/2014  Acute renal failure ([facility] [address] 10/20/2014 Katherine Hameed MD 
[facility] 4/5/2021

## 2021-04-05 NOTE — PROGRESS NOTES
[facility] CARDIOLOGY PROGRESS NOTE 
      
 
4/5/2021 1:30 PM 
 
Admit Date: 3/31/2021 Subjective:  
Continues to have low BP with standing and pain in knee (right). No chest pain. Some edema of extremities remains. ROS: 
Cardiovascular:  As noted above Objective:  
  
Vitals:  
 04/05/21 0022 04/05/21 0500 04/05/21 0732 04/05/21 1146 BP: 119/62 123/67 133/62 115/68 Pulse: 87 77 64 62 Resp: 18 18 18 18 Temp: 97.7 °F (36.5 °C) 98 °F (36.7 °C) 97.4 °F (36.3 °C) 97.5 °F (36.4 °C) SpO2: 99% 98% 95% 99% Weight:      
Height:      
 
 
Physical Exam: 
General-No Acute Distress, awake, interactive Neck- supple, no JVD 
CV- regular rate and rhythm no MRG Lung- minimal crackles at lower fields bilaterally Abd- soft, nontender, nondistended Ext- mild arm edema bilaterally. , R Knee with dressing in place Skin- warm and dry Data Review:  
Recent Labs 04/05/21 
4132 04/04/21 
2200 04/03/21 
3021 04/03/21 
0851 * 144  144   < > 144 K 3.7 3.2*  3.2*   < > 3.3*  
MG  --  1.9  --  2.0  
BUN 48* 54*  54*   < > 59* CREA 2.94* 3.22*  3.22*   < > 3.50* * 138*  138*   < > 162* WBC  --  13.9*  --  14.2* HGB  --  8.2*  --  8.3* HCT  --  25.5*  --  26.1*  
PLT  --  89*  --  90*  
 < > = values in this interval not displayed. Assessment/Plan:  
 
Principal Problem: 
  ARACELIS (acute kidney injury) ([facility] [address] (3/31/2021) - pre renal , improved with fluid resuscitation Active Problems: 
  Orthostatic hypotension (10/21/2014) 
  - hold diuresis , IVF and PO fluids as tolerated 
  - renal function improving - SPEP normal OSH labs 2/11/21 S/P CABG x 4 (10/21/2014) - cont ASA, statin Anemia (11/12/2020) 
  - monitor , stable Ischemic cardiomyopathy (11/12/2020) 
  - LVEF 20-25% 
  - on low dose metoprolol XL 25 at home 
  - no BP room for ARB/ACE inhibitor , renal function will not allow at this time   PAF (paroxysmal atrial fibrillation) ([facility] [address] (4/1/2021) 
  - anticoagulation held due to anemia, falls with injuries - rate control strategy Pulmonary interstitial fibrosis ([facility] [address] (4/1/2021) - per Rogerio Tolentino DO 
4/5/2021 1:30 PM

## 2021-04-05 NOTE — PROGRESS NOTES
Pt resting quietly in bed. Rounding completed with no c/o pain at this time. Will report to oncoming RN.

## 2021-04-05 NOTE — PROGRESS NOTES
Met with patient and patient's wife at bedside regarding d/c plan. They are both in agreement with short term rehab.   
Patient's wife stated their daughter, Gualberto Mcghee is out looking at facilities at this time and they will have choices by tomorrow am.  
I will send short term rehab referrals after speaking with family 4/6 am.

## 2021-04-05 NOTE — PROGRESS NOTES
Patient identified for and enrolled in Renal bundle. Enrollment for 90 post d/c. CTN to follow up after d/c.

## 2021-04-05 NOTE — PROGRESS NOTES
Pt rounded on hourly and PRN. No complaints of pain, nausea,vomiting. Bed is low, locked, call bell within reach. All needs met this shift. Will continue to monitor until next RN comes on.

## 2021-04-06 NOTE — PROGRESS NOTES
[facility] Subjective: A on CKD Patient seen and examined on rounds, daughter and spouse at the bedside, no new complaints, good uop. Review of Systems -  
General: no fever/chills, appetite ok CV: No CP Lung: no SOB, no cough GI: no N/V, ostomy output at baseline : no dysuria Ext: + trace edema Objective: 
 
Vitals:  
 04/05/21 1956 04/06/21 0104 04/06/21 1481 04/06/21 5218 BP: 129/65 134/64 109/66 133/65 Pulse: 74 64 65 (!) 59 Resp: 18 18 18 18 Temp: 97.8 °F (36.6 °C) 97.7 °F (36.5 °C) 98 °F (36.7 °C) 97.9 °F (36.6 °C) SpO2: 97% 97% 97% 98% Weight:      
Height:      
 
 
PE 
Gen: in no acute distress, alert and oriented CV:reg rate, S1, S2, no Rub Chest:clear bilaterally Abd: soft, non tender, + BS, + ostomy Ext/Access: + BLE trace edema R>L [employer] [facility] Recent Labs 04/06/21 
4847 04/04/21 
4849 WBC 11.7* 13.9* HGB 8.4* 8.2* HCT 26.9* 25.5* PLT 98* 89* Recent Labs 04/06/21 
0176 04/05/21 
8546 04/04/21 
8652  146* 144  144  
K 4.2 3.7 3.2*  3.2*  
* 122* 119*  119* CO2 15* 13* 14*  14* * 144* 138*  138* BUN 43* 48* 54*  54* CREA 2.76* 2.94* 3.22*  3.22* MG  --   --  1.9 PHOS  --  2.2* 3.2 CA 7.8* 7.8* 7.8*  7.8* ALB  --  1.9* 1.9* Radiology A/P:  
Patient Active Problem List  
Diagnosis Code  Acute renal failure (HCC) N17.9  Syncope R55  Orthostatic hypotension I95.1  History of ileostomy Z98.890  
 Dehydration E86.0  Anorexia R63.0  Weakness generalized R53.1  Fatigue R53.83  
 Malaise R53.81  
 S/P CABG x 4 Z95.1  Hypertension I10  
 History of ulcerative colitis Z87.19  
 Hypomagnesemia E83.42  
 Atherosclerosis of native coronary artery of native heart without angina pectoris I25.10  Adrenal insufficiency due to cancer therapy ([facility] [address] E27.3  LV dysfunction I51.9  Abnormal cardiovascular stress test R94.39  
 Chronic renal failure, stage 4 (severe) ([facility] [address] N18.4  Anemia D64.9  
 Ischemic cardiomyopathy I25.5  ARACELIS (acute kidney injury) ([facility] [address] N17.9  Atrial fibrillation (HCC) I48.91  
 Pulmonary interstitial fibrosis (HCC) J84.10  PAF (paroxysmal atrial fibrillation) (HCC) I48.0 A on CKD4 - renal function improving, non oliguric, will d/c IVF encouraged PO intake and trend renal indices DW Dr. Segundo Gamez Pulm Fibrosis Thrombocytopenia Septic Bursitis - s/p I &D, abx per primary Orthostatic Hypotension Adrenal Insufficiency on steroids Erika Suarez NP

## 2021-04-06 NOTE — PROGRESS NOTES
[facility] CARDIOLOGY PROGRESS NOTE 
      
 
4/6/2021 2:30 PM 
 
Admit Date: 3/31/2021 Subjective:  
 
Has no cardiac complaints. Denies any chest discomfort. Improving creatinine. ROS: 
Cardiovascular:  As noted above Objective:  
  
Vitals:  
 04/06/21 0458 04/06/21 0751 04/06/21 1026 04/06/21 1633 BP: 109/66 133/65 109/65 113/61 Pulse: 65 (!) 59 62 65 Resp: 18 18 17 17 Temp: 98 °F (36.7 °C) 97.9 °F (36.6 °C) 97.5 °F (36.4 °C) 98 °F (36.7 °C) SpO2: 97% 98% 98% 99% Weight:      
Height:      
 
 
Physical Exam: 
General-No Acute Distress, awake, interactive Neck- supple, no JVD 
CV- regular rate and rhythm no MRG Lung-fine crackles at lower fields bilaterally Abd- soft, nontender, nondistended Ext- mild arm edema bilaterally. , R Knee with dressing in place Skin- warm and dry Data Review:  
Recent Labs 04/06/21 
6411 04/05/21 
0098 04/04/21 
4670  146* 144  144  
K 4.2 3.7 3.2*  3.2*  
MG  --   --  1.9 BUN 43* 48* 54*  54* CREA 2.76* 2.94* 3.22*  3.22* * 144* 138*  138* WBC 11.7*  --  13.9* HGB 8.4*  --  8.2* HCT 26.9*  --  25.5* PLT 98*  --  89* Assessment/Plan:  
 
Principal Problem: 
  ARACELIS (acute kidney injury) ([facility] [address] (3/31/2021) - pre renal , improved with fluid resuscitation Active Problems: 
  Orthostatic hypotension (10/21/2014) -Continue to hold diuresis , fluids as tolerated/needed 
  - renal function improving - SPEP normal  
  -Stated adrenal insufficiency ws outpatient; noted on hydrocortisone/Florinef prior to admission and defer to primary team.  Regimen needs to be reassessed in the setting of severe left ventricular dysfunction S/P CABG x 4 (10/21/2014) - cont ASA, statin Anemia (11/12/2020) 
  - monitor , stable   Ischemic cardiomyopathy (11/12/2020) 
  - LVEF 20-25% 
  - on low dose metoprolol XL 25 at home; hold off therapy at this time in the setting of low BPs limiting therapy. Also no BP room for ARB/ACE inhibitor/MRA , furthermore renal function will not allow at this time PAF (paroxysmal atrial fibrillation) ([facility] [address] (4/1/2021) 
  - anticoagulation held due to anemia, falls with injuries - rate control strategy Pulmonary interstitial fibrosis ([facility] (4/1/2021) - per primary Luis Alfredo Whitmore MD 
4/6/2021 2:30 PM 
 
 28-Jun-2018 06:00

## 2021-04-06 NOTE — PROGRESS NOTES
Infectious Disease Progress Note Today's Date: 2021 Admit Date: 3/31/2021 Impression: · MSSA R knee prepatellar bursitis, s/p washout , op cultures MSSA (clinda-R). 3/31 BC negative · MSSA  elbow olecranon bursitis, s/p aspiration , MSSA · Acute on CKD 4 Plan:  
· Continue Cefazolin renal dosed while admitted · Could transition to Cefadroxil 500mg PO BID at time of discharge to complete total 21 days from debridement EOT 21 · ID follow up 21 @ 9:30am 
· ID will sign off, please call with questions or concerns Anti-infectives: · Cefazolin - Subjective:  
Seen with wife at bed. Pain is improving. Tolerating antbx, no fever, chills, sweats. Allergies Allergen Reactions  Levaquin [Levofloxacin] Other (comments) Acute renal failure. Review of Systems:  Pertinent items are noted in the History of Present Illness. Objective:  
 
Visit Vitals /65 (BP 1 Location: Left upper arm, BP Patient Position: Reclining) Pulse 62 Temp 97.5 °F (36.4 °C) Resp 17 Ht 6' 3\" (1.905 m) Wt 77.1 kg (170 lb) SpO2 98% BMI 21.25 kg/m² Temp (24hrs), Av.8 °F (36.6 °C), Min:97.5 °F (36.4 °C), Max:98 °F (36.7 °C) Patient examined 2021, exam remains unchanged except as noted below: 
 
General:  Alert, cooperative, no acute distress Head:  Normocephalic, atraumatic Eyes:  Anicteric, no drainage/not injected Throat: Mucus membranes moist OP clear Neck: Supple, symmetrical, trachea midline, no JVD Lungs:   Clear throughout lung fields, no increased work of breathing Heart:  Irregular rate and rhythm, without murmur Abdomen:   Soft, non-tender, no guarding, no distention, bowel sounds active Extremities: R arm with edema, Ace wrap over elbow. R knee edema improved, no erythema/drainage Skin: Skin without rash, very thin Lines/Devices: PIV Data Review: CBC: 
Recent Labs 21 
3825 21 
5401 WBC 11.7* 13.9*  
GRANS  --  90* MONOS  --  5  
EOS  --  1 ANEU  --  12.4* ABL  --  0.3* HGB 8.4* 8.2* HCT 26.9* 25.5* PLT 98* 89* BMP: 
Recent Labs 04/06/21 
9274 04/05/21 
8580 04/04/21 
3763 CREA 2.76* 2.94* 3.22*  3.22* BUN 43* 48* 54*  54*  146* 144  144  
K 4.2 3.7 3.2*  3.2*  
* 122* 119*  119* CO2 15* 13* 14*  14* AGAP 7 11 11  11 * 144* 138*  138* LFTS: 
Recent Labs 04/05/21 
3842 04/04/21 
2983 ALB 1.9* 1.9* Microbiology:  
 
All Micro Results Procedure Component Value Units Date/Time Angi Brock [[id number]] Collected: 04/02/21 1039 Order Status: Completed Specimen: Knee  Updated: 04/05/21 0269 Special Requests: --     
  RIGHT PATELLA PUS Culture result: SUBCULTURE IS NECESSARY TO DETERMINE PRESENCE OR ABSENCE OF ANAEROBIC BACTERIA IN THIS CULTURE. FURTHER REPORT TO FOLLOW AFTER INCUBATION OF SUBCULTURE. Angi Brock [[id number]] Collected: 04/02/21 1045 Order Status: Completed Specimen: Elbow Updated: 04/05/21 8539 Special Requests: RIGHT Culture result: SUBCULTURE IS NECESSARY TO DETERMINE PRESENCE OR ABSENCE OF ANAEROBIC BACTERIA IN THIS CULTURE. FURTHER REPORT TO FOLLOW AFTER INCUBATION OF SUBCULTURE. CULTURE, BLOOD [[id number]] Collected: 03/31/21 2135 Order Status: Completed Specimen: Blood Updated: 04/05/21 0703 Special Requests: --     
  RIGHT 
HAND Culture result: NO GROWTH 5 DAYS     
 CULTURE, BODY FLUID Frank Diver STAIN [[id number]]  (Abnormal)  (Susceptibility) Collected: 04/02/21 1039 Order Status: Completed Specimen: Knee  Updated: 04/04/21 8690 Special Requests: --     
  RIGHT PATELLA PUS 
  
  GRAM STAIN    
  WBCS SEEN TOO NUMEROUS TO COUNT MODERATE GRAM POSITIVE COCCI Culture result:   MODERATE STAPHYLOCOCCUS AUREUS  
     
 CULTURE, BODY FLUID [address] [[id number]]  (Abnormal) (Susceptibility) Collected: 04/02/21 1045 Order Status: Completed Specimen: Elbow Updated: 04/04/21 7807 Special Requests: RIGHT     
  GRAM STAIN    
  WBCS SEEN TOO NUMEROUS TO COUNT MODERATE GRAM POSITIVE COCCI Culture result:    
  HEAVY STAPHYLOCOCCUS AUREUS  
     
 CULTURE, Berl Kvng STAIN [[id number]] Collected: 04/02/21 1215 Order Status: Canceled Specimen: Wound from Knee CULTURE, ANAEROBIC [[id number]] Collected: 04/02/21 1115 Order Status: Canceled Specimen: Surgical Specimen Rose Mary Kenny STAIN [[id number]] Collected: 04/02/21 1045 Order Status: Canceled Specimen: Wound from Elbow Imaging: As noted Signed By: Clark Webb NP April 6, 2021

## 2021-04-06 NOTE — PROGRESS NOTES
[facility] Service History and Physical 
 
Patient ID: Elroy Leblanc 
male 1940 
[id number] Admission Date: 3/31/2021 Chief Complaint: SOB, PACHECO Reason for Admission: ARACELIS on CKD ASSESSMENT & PLAN: 
 
#ARACELIS on CKD4 Presented with acute on chronic kidney disease with creatinine greater than 5. Creatinine down trended significantly with IV fluid hydration. Nephrology on board. IV fluids stopped on April 6 to see patient's p.o. intake and ability to maintain his creatinine. #Delirium: Side effects and benefit discussed with the family. After listening to the side effects and benefit, patient family wants him to be on trial of Depakote. 
-Avoid benzodiazepine. Delirium precautions. #Orthostatic hypotension: Patient has orthostatic hypotension. Use of midodrine and fludrocortisone discussed. After listening to the side effects, family wants to try midodrine. #Afib Cardiology on board. Patient is already off anticoagulation based on discussion with the family and cardiologist with the risk of bleeding with falls. Rate control at present. #Right knee cellulitis/inflammation Found to have MSSA bursitis of the knee. Elbow is already drained 2. Currently on cefazolin. Infectious disease recommended switch to cefadroxil on discharge; total course of therapy 21 days. Will see in outpatient follow up prior to completing course of therapy. Ortho recommended continuing wound care and outpatient follow-up. #Adrenal Insufficiency  
- cont with home hydrocortisone. #HFrEF 
- EF 25% on most recent ECHO 
- hypotension and orthostasis limiting HF medications 
- try on low dose lopressor BID for rate control, hold for SBP < 100 April 1: Appreciate cardiology recommendations. #CAD s/p CABG 
- cont ASA, statin 
- trial Lopressor BID April 1: Appreciate cardiology recommendation. #Acute on Chronic Anemia - , check B12/folate 
- Hgb 10, baseline 12-13 
- no obvious blood loss 
- transfuse for Hgb<8 in setting of CAD April 1: Hemoglobin 8.8. Anticipate further drop given patient looks slightly dehydrated on examination. -B12 level and folate level not low. We will check iron studies and TSH with next blood work-up. #GERD - cont PPI #Sacral ulcer: Examination deferred patient was evaluated in preop area. Wound consult placed. April 3: Small less than 1 cm skin tear in the gluteal area without any signs or symptoms of infection. Diet: renal  
VTE ppx: Heparin for DVT prophylaxis. GI ppx: PPI Patient's daughter Valeri Pappas [[id number]] and wife was at bedside. Extensively discussed the side effects and benefit of midodrine and Depakote for orthostatic hypotension and delirium with the family and they would like to start the medication on the patient. HISTORY OF PRESENT ILLNESS: 
Patient is a 78 y.o. 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 intake and extreme weakness. He was found to have acute on chronic kidney injury and right knee cellulitis/deep tissue infection. He underwent IND of his knee on April 3 and aspiration of his elbow. He is going MSSA. He was initially given daptomycin and ceftriaxone but ultimately he is switched to cefazolin. ID recommended transitioning patient to oral regimen on discharge and outpatient follow-up. Orthopedic surgeon recommended outpatient follow-up. For acute on chronic kidney injury, his kidney function improved significantly with IV fluid hydration. Fluid was stopped on April 6 to see patient's p.o. intake and ability to maintain kidney function. Nephrology on board.   Cardiology followed patient for atrial fibrillation. April 6: Patient still dealing with delirium at night and yesterday was a bad day. In addition, he has orthostatic hypotension on standing. Patient is AO x3 with cues. Does not remember about night episode. As per daughter, patient was back to normal when she came back to the hospital and after few hours. Per patient his pain is improving. Denies any shortness of breath, chest pain, Palpitation,  Nausea and vomiting. Rest review of system negative except mentioned above. PHYSICAL EXAM: 
 
Visit Vitals /65 (BP 1 Location: Left upper arm, BP Patient Position: Reclining) Pulse 62 Temp 97.5 °F (36.4 °C) Resp 17 Ht 6' 3\" (1.905 m) Wt 77.1 kg (170 lb) SpO2 98% BMI 21.25 kg/m² General: No acute distress, speaking in full sentences, no use of accessory muscles HEENT: Pupils equal.  Extraocular muscle intact. Neck: Supple, no JVD Lungs: Bilateral lower lobe crackles otherwise clear to auscultate bilaterally Cardiovascular: Irregularly irregular with normal S1 and S2 Abdomen: Soft, nontender, nondistended, normoactive bowel sounds Extremities: No cyanosis clubbing. 1+ Edema. Neuro: Nonfocal, A&O x3 with cues. Psych: Mild cognitive impairment noted. Skin: Dressing on right knee and left elbow [intact for surgical site exam to surgery]. April 5, 2021: Small, 1 cm skin tear without any signs or infection on right gluteal area. Procedures done this admission: 
Procedure(s): RIGHT KNEE INCISION AND DRAINAGE/RIGHT ELBOW WOUND FLUID ASPIRATION All Micro Results Procedure Component Value Units Date/Time Dow Barrier [[id number]] Collected: 04/02/21 1039 Order Status: Completed Specimen: Knee  Updated: 04/05/21 8820 Special Requests: --     
  RIGHT PATELLA PUS Culture result: SUBCULTURE IS NECESSARY TO DETERMINE PRESENCE OR ABSENCE OF ANAEROBIC BACTERIA IN THIS CULTURE. FURTHER REPORT TO FOLLOW AFTER INCUBATION OF SUBCULTURE. Rock Domínguez [[id number]] Collected: 04/02/21 1045 Order Status: Completed Specimen: Elbow Updated: 04/05/21 9615 Special Requests: RIGHT Culture result: SUBCULTURE IS NECESSARY TO DETERMINE PRESENCE OR ABSENCE OF ANAEROBIC BACTERIA IN THIS CULTURE. FURTHER REPORT TO FOLLOW AFTER INCUBATION OF SUBCULTURE. CULTURE, BLOOD [[id number]] Collected: 03/31/21 2135 Order Status: Completed Specimen: Blood Updated: 04/05/21 0703 Special Requests: --     
  RIGHT 
HAND Culture result: NO GROWTH 5 DAYS     
 CULTURE, BODY FLUID [place] STAIN [[id number]]  (Abnormal)  (Susceptibility) Collected: 04/02/21 1039 Order Status: Completed Specimen: Knee  Updated: 04/04/21 6290 Special Requests: --     
  RIGHT PATELLA PUS 
  
  GRAM STAIN    
  WBCS SEEN TOO NUMEROUS TO COUNT MODERATE GRAM POSITIVE COCCI Culture result: MODERATE STAPHYLOCOCCUS AUREUS  
     
 CULTURE, BODY FLUID [address] [[id number]]  (Abnormal)  (Susceptibility) Collected: 04/02/21 1045 Order Status: Completed Specimen: Elbow Updated: 04/04/21 0192 Special Requests: RIGHT     
  GRAM STAIN    
  WBCS SEEN TOO NUMEROUS TO COUNT MODERATE GRAM POSITIVE COCCI Culture result:    
  HEAVY STAPHYLOCOCCUS AUREUS  
     
 CULTURE, Katherne [place] STAIN [[id number]] Collected: 04/02/21 1215 Order Status: Canceled Specimen: Wound from Knee CULTURE, ANAEROBIC [[id number]] Collected: 04/02/21 1115 Order Status: Canceled Specimen: Surgical Specimen Geralynn Gene STAIN [[id number]] Collected: 04/02/21 1045 Order Status: Canceled Specimen: Wound from Elbow SARS-CoV-2 Lab Results \"Novel Coronavirus\" Test: No results found for: COV2NT \"Emergent Disease\" Test: No results found for: EDPR \"SARS-COV-2\" Test: No results found for: XGCOVT \"[facility] Labs\" Test: No results found for: RSLT Rapid Test: No results found for: COVR Labs: Results:  
   
BMP, Mg, Phos Recent Labs 04/06/21 
7530 04/05/21 
5295 04/04/21 2051  146* 144  144  
K 4.2 3.7 3.2*  3.2*  
* 122* 119*  119* CO2 15* 13* 14*  14* AGAP 7 11 11  11 BUN 43* 48* 54*  54* CREA 2.76* 2.94* 3.22*  3.22* CA 7.8* 7.8* 7.8*  7.8*  
* 144* 138*  138* MG  --   --  1.9 PHOS  --  2.2* 3.2  
  
CBC Recent Labs 04/06/21 
6040 04/04/21 
6381 WBC 11.7* 13.9*  
RBC 2.52* 2.40* HGB 8.4* 8.2* HCT 26.9* 25.5* PLT 98* 89* GRANS  --  90* LYMPH  --  2* EOS  --  1 MONOS  --  5  
BASOS  --  0 IG  --  3  
ANEU  --  12.4* ABL  --  0.3* MARIA R  --  0.1 ABM  --  0.7 ABB  --  0.0 AIG  --  0.4 LFT Recent Labs 04/05/21 
3579 04/04/21 
2785 ALB 1.9* 1.9* Cardiac Testing Lab Results Component Value Date/Time CK 58 10/20/2014 07:12 PM  
 Troponin-I, Qt. <0.02 (L) 10/20/2014 07:12 PM  
  
Coagulation Tests Lab Results Component Value Date/Time Prothrombin time 10.5 05/04/2015 11:03 AM  
 INR 1.0 05/04/2015 11:03 AM  
  
A1c No results found for: HBA1C, HGBE8, OAZ2JOKC, EFJ3KWWA Lipid Panel No results found for: CHOL, CHOLPOCT, CHOLX, CHLST, CHOLV, 947529, HDL, HDLP, LDL, LDLC, DLDLP, 671504, VLDLC, VLDL, TGLX, TRIGL, TRIGP, TGLPOCT, CHHD, CHHDX Thyroid Panel Lab Results Component Value Date/Time TSH 0.741 04/02/2021 05:35 AM  
 T4, Free 0.8 04/02/2021 05:35 AM  
    
Most Recent UA Lab Results Component Value Date/Time  Color YELLOW 04/01/2021 10:00 AM  
 Appearance CLEAR 04/01/2021 10:00 AM  
 Specific gravity 1.017 10/20/2014 08:20 PM  
 pH (UA) 5.5 04/01/2021 10:00 AM  
 Protein Negative 04/01/2021 10:00 AM  
 Glucose Negative 04/01/2021 10:00 AM  
 Ketone Negative 04/01/2021 10:00 AM  
 Bilirubin Negative 04/01/2021 10:00 AM  
 Blood Negative 04/01/2021 10:00 AM  
 Urobilinogen 0.2 04/01/2021 10:00 AM  
 Nitrites Negative 04/01/2021 10:00 AM  
 Leukocyte Esterase Negative 04/01/2021 10:00 AM  
 WBC 0-3 03/31/2021 05:55 PM  
 RBC 10-20 03/31/2021 05:55 PM  
 Epithelial cells 0-3 03/31/2021 05:55 PM  
 Bacteria 0 03/31/2021 05:55 PM  
 Casts 0-3 03/31/2021 05:55 PM  
  
 
 
 
Active Problems: 
 
Patient Active Problem List  
 Diagnosis Date Noted  Pulmonary interstitial fibrosis ([facility] [address] 04/01/2021  PAF (paroxysmal atrial fibrillation) ([facility] [address] 04/01/2021  ARACELIS (acute kidney injury) ([facility] [address] 03/31/2021  Atrial fibrillation ([address] 03/31/2021  Chronic renal failure, stage 4 (severe) ([address] 11/12/2020  Anemia 11/12/2020  Ischemic cardiomyopathy 11/12/2020  Abnormal cardiovascular stress test 02/27/2020  LV dysfunction 02/03/2020  Atherosclerosis of native coronary artery of native heart without angina pectoris 12/26/2019  Adrenal insufficiency due to cancer therapy ([facility] [address] 12/26/2019  Hypomagnesemia 10/22/2014  Syncope 10/21/2014  Orthostatic hypotension 10/21/2014  History of ileostomy 10/21/2014  Dehydration 10/21/2014  Anorexia 10/21/2014  Weakness generalized 10/21/2014  Fatigue 10/21/2014  Malaise 10/21/2014  S/P CABG x 4 10/21/2014  Hypertension 10/21/2014  History of ulcerative colitis 10/21/2014  Acute renal failure ([facility] [address] 10/20/2014 Arnold Zepdea MD 
[facility] Hospitalist Service 4/6/2021

## 2021-04-06 NOTE — WOUND CARE
Right elbow 0.4x1cm from recent surgery, well healing small amount of serous drainage. Left knee scab on anterior, stitch x 1 and 0.6x1cm open area packing, packing removed and short wick replaced. Dry dressing Abd Cathy and ace over right elbow and right knee, these surgical sites are well healing. Dr. Jacqueline Mckeon notified.  Right buttock improving, well healing, partial thickness continue zinc cream.

## 2021-04-06 NOTE — PROGRESS NOTES
Spoke with patient and patient's spouse at bedside - patient's spouse asked that I call their daughter to discuss the d/c plan - Pietro Gomes [phone] Jose Frankel stated they wish for patient to go to rehab and the preferences are:  
1. [facility] 2. [address] 3. [address] Referrals sent - will follow up after reviewed.

## 2021-04-06 NOTE — PROGRESS NOTES
Progress Note Patient: Candelaria Mcgee MRN: [id number]  SSN: [id number] YOB: 1940  Age: 78 y.o. Sex: male Admit Date: 3/31/2021 LOS: 6 days Subjective:  
 
Patient reports that right elbow and right knee pain seem to be much better Objective:  
 
Vitals:  
 04/06/21 0104 04/06/21 0458 04/06/21 0751 04/06/21 1026 BP: 134/64 109/66 133/65 109/65 Pulse: 64 65 (!) 59 62 Resp: 18 18 18 17 Temp: 97.7 °F (36.5 °C) 98 °F (36.7 °C) 97.9 °F (36.6 °C) 97.5 °F (36.4 °C) SpO2: 97% 97% 98% 98% Weight:      
Height:      
  
 
Intake and Output: 
Current Shift: 04/06 0701 - 04/06 1900 In: 160 [P.O.:160] Out: 200 Last three shifts: 04/04 1901 - 04/06 0700 In: 1050 [P.O.:250; I.V.:800] Out: 2015 [Urine:890] alert and oriented to at least person Skin - dressing clean dry and intact Motor and sensory function intact in RIGHT LOWER and RIGHT UPPER extremity Pulses palpable in RIGHT LOWER and RIGHT UPPER extremity Lab/Data Review: CBC:  
Lab Results Component Value Date/Time WBC 11.7 (H) 04/06/2021 05:16 AM  
 HGB 8.4 (L) 04/06/2021 05:16 AM  
 HCT 26.9 (L) 04/06/2021 05:16 AM  
 PLT 98 (L) 04/06/2021 05:16 AM  
  
 
 
Assessment:  
 
Acute postop blood loss anemia with hemoglobin stable at 8.4, POD #4 from I&D of right knee prepatellar bursa as well as aspiration of right elbow olecranon bursa Plan:  
 
Continue just dry dressing changes daily and as needed he will need orthopedic follow-up approximately 1 to 2 weeks after discharge Signed By: Rj Flores MD   
 April 6, 2021

## 2021-04-07 PROBLEM — M71.9 BURSITIS: Status: ACTIVE | Noted: 2021-01-01

## 2021-04-07 PROBLEM — R41.0 DELIRIUM: Status: ACTIVE | Noted: 2021-01-01

## 2021-04-07 PROBLEM — E27.40 ADRENAL INSUFFICIENCY (HCC): Status: ACTIVE | Noted: 2019-12-26

## 2021-04-07 NOTE — INTERDISCIPLINARY ROUNDS
Interdisciplinary Rounds completed with Nursing, Case Management, Physician and PT/OT present. Plan of care reviewed and updated. D/C remote tele.   Possible d/c in am.

## 2021-04-07 NOTE — PROGRESS NOTES
ACUTE OT GOALS: 
(Developed with and agreed upon by patient and/or caregiver.) 1. Pt will toilet with min A 2. Pt will complete functional mobility for ADLs with min A 3. Pt will complete lower body dressing with min A using AE as needed 4. Pt will complete UB bathing and dressing with min A 5. Pt will demonstrate independence with HEP to promote increased BUE strength, ROM, and functional use for ADLs 6. Pt will tolerate 23 minutes functional activity with min or fewer rest breaks to promote increased endurance for ADLs 7. Pt will complete bed mobility with SBA in prep for ADLs OCCUPATIONAL THERAPY: Daily Note OT Treatment Day # 2 Vincent Rodriguez is a 78 y.o. male PRIMARY DIAGNOSIS: ARACELIS (acute kidney injury) ([facility] [address] ARACELIS (acute kidney injury) ([facility] [address] [N17.9] Procedure(s) (LRB): 
RIGHT KNEE INCISION AND DRAINAGE/RIGHT ELBOW WOUND FLUID ASPIRATION (Right) 5 Days Post-Op [place] / [place] / Product Type: Medicare /  
ASSESSMENT:  
 
REHAB RECOMMENDATIONS: CURRENT LEVEL OF FUNCTION: 
(Most Recently Demonstrated) Recommendation to date pending progress: 
Setting:  Short-term Rehab Equipment:  
 Rolling Walker Bathing: 
 Not tested Dressing:  Not tested Feeding/Grooming:  Not tested Toileting:  Not tested Functional Mobility:  Moderate Assistance x 2  
 
ASSESSMENT: 
Mr. Geraldine Crowder presents with decreased activity tolerance, decreased muscle strength, and edema in UE's. Pt participated in sitting balance activities, STS transfers, and standing balance. Pt became dizzy when standing each time. Pt required additional rest breaks throughout treatment. Continue POC. SUBJECTIVE:  
Mr. Geraldine Crowder states, \"I feel like my head is spinning. \" SOCIAL HISTORY/LIVING ENVIRONMENT: 
Home Environment: Private residence One/Two Story Residence: One story Living Alone: Yes Support Systems: Child(delfino), Family member(s), Spouse/Significant Other/Partner OBJECTIVE:  
 PAIN: VITAL SIGNS: LINES/DRAINS:  
Pre Treatment: Pain Screen Pain Scale 1: Numeric (0 - 10) Pain Intensity 1: 0 Post Treatment: 0   Colostomy O2 Device: Room air ACTIVITIES OF DAILY LIVING: I Mod I S SBA CGA Min Mod Max Total NT Comments BASIC ADLs:             
Bathing/ Showering [] [] [] [] [] [] [] [] [] [x] Toileting [] [] [] [] [] [] [] [] [] [x] Dressing [] [] [] [] [] [] [] [] [] [x] Feeding [] [] [] [] [] [] [] [] [] [x] Grooming [] [] [] [] [] [] [] [] [] [x] Personal Device Care [] [] [] [] [] [] [] [] [] [x] Functional Mobility [] [] [] [] [] [] [x] [] [] [] I=Independent, Mod I=Modified Independent, S=Supervision, SBA=Standby Assistance, CGA=Contact Guard Assistance,  
Min=Minimal Assistance, Mod=Moderate Assistance, Max=Maximal Assistance, Total=Total Assistance, NT=Not Tested MOBILITY: I Mod I S SBA CGA Min Mod Max Total  NT x2 Comments:  
Supine to sit [] [] [] [] [] [] [x] [] [] [] [x] Sit to supine [] [] [] [] [] [] [x] [] [] [] [x] Sit to stand [] [] [] [] [] [] [x] [] [] [] [x] Bed to chair [] [] [] [] [] [] [] [] [] [x] [] I=Independent, Mod I=Modified Independent, S=Supervision, SBA=Standby Assistance, CGA=Contact Guard Assistance,  
Min=Minimal Assistance, Mod=Moderate Assistance, Max=Maximal Assistance, Total=Total Assistance, NT=Not Tested BALANCE: Good Fair+ Fair Fair- Poor NT Comments Sitting Static [] [x] [] [] [] [] Sitting Dynamic [] [] [x] [] [] []   
         
Standing Static [] [] [x] [] [] []   
Standing Dynamic [] [] [] [] [] [x] PLAN:  
FREQUENCY/DURATION: OT Plan of Care: 3 times/week for duration of hospital stay or until stated goals are met, whichever comes first. 
 
TREATMENT:  
TREATMENT:  
( $$ Neuromuscular Re-Education: 38-52 mins   ) Neuromuscular Re-education (40 Minutes): Neuromuscular Re-education included Balance Training, Postural training, Sitting balance training and Standing balance training to improve Balance, Coordination, Functional Mobility and Postural Control. TREATMENT GRID: 
N/A 
 
AFTER TREATMENT POSITION/PRECAUTIONS: 
Bed, Needs within reach, RN notified and Visitors at bedside INTERDISCIPLINARY COLLABORATION: 
RN/PCT, PT/PTA and OT/HENDERSON TOTAL TREATMENT DURATION: 
OT Patient Time In/Time Out Time In: 1115 Time Out: 1155 Noa Styles

## 2021-04-07 NOTE — PROGRESS NOTES
Hourly rounds complete this shift, no new complaints at this time, Family with patient: bed in low, locked position, call light and bedside table within reach,  all needs met. Will continue to monitor Report to day shift nurse.

## 2021-04-07 NOTE — PROGRESS NOTES
[facility] CARDIOLOGY PROGRESS NOTE 
      
 
4/7/2021 7:31 AM 
 
Admit Date: 3/31/2021 Subjective: The patient does not report angina, shortness of breath at rest, or palpitations. ROS: 
Constitutional:   Negative for fevers and unexplained weight loss. Eyes:   Negative for visual disturbance. ENT:   Negative for significant hearing loss and tinnitus. Respiratory:   Negative for hemoptysis. Cardiovascular:   Negative except as noted in HPI. Gastrointestinal:   Negative for melena and abdominal pain. Genitourinary:   Negative for hematuria, renal stones. Integumentary:   Negative for rash or non-healing wounds Hematologic/Lymphatic:   Negative for excessive bleeding hx or clotting disorder. Musculoskeletal:  Negative for active, unexplained/severe joint pain. Neurological:   Negative for stroke. Behavioral/Psych:   Negative for suicidal ideations. Endocrine:   Negative for uncontrolled diabetic symptoms including polyuria, polydipsia and poor wound healing. Objective:  
  
Vitals:  
 04/06/21 1633 04/06/21 1942 04/06/21 2200 04/07/21 0450 BP: 113/61 128/62 115/67 126/63 Pulse: 65 75 83 74 Resp: 17 18 18 18 Temp: 98 °F (36.7 °C) 97.6 °F (36.4 °C) 97.7 °F (36.5 °C) 97.9 °F (36.6 °C) SpO2: 99% 98% 98% 98% Weight:      
Height:      
 
 
Physical Exam: 
General-No Acute Distress Neck- supple, no JVD 
CV- regular rate and rhythm no RG 
Lung- clear bilaterally Abd- soft, nontender, nondistended Ext- no edema bilaterally. Skin- warm and dry Data Review:  
Recent Labs 04/07/21 
7014 04/06/21 
1131  143  
K 4.5 4.2 BUN 45* 43* CREA 2.85* 2.76* GLU 83 112* WBC 12.6* 11.7* HGB 8.9* 8.4* HCT 29.0* 26.9*  
* 98* Assessment/Plan:  
 
Paroxysmal atrial fibrillation - currently in sinus rhythm 
- not on [address] 2/2 falls, as per primary cardiologist 
- patient with anemia and thrombocytopenia in the hospital (thrombocytopenia noted in the ED; less likely HIT) - consider LAAO as an outpatient if NOAC thought to be contraindicated 
- goal K>4 and Mg>2 within normal limits 
  
Hx of CABG 
- c/w ASA and statin 
  
Ischemic cardiomyopathy 
- EF 20-25% on most recent ECHO 
- Patient not on GDMT currently on Florinef and midodrine for OH (patient also hydrocortisone for adrenal insufficiency) 
- sCr improved off of diuretics 
  
Septic bursitis - persistent leukocytosis 
- mgmt per hospitalist/ID 
  
ARACELIS on CKD stage IV 
- sCr improved off diuretics and with IV fluids 
- nephrology following Bree Goel MD

## 2021-04-07 NOTE — PROGRESS NOTES
[facility] Subjective: A on CKD Patient seen and examined on rounds, daughter and spouse at the bedside, intake is better, + weakness Labs and chart reviewed- renal function holding with d/c IVF yesterday. Review of Systems -  
General: no fever/chills, appetite ok CV: No CP Lung: no SOB, no cough GI: no N/V, ostomy output at baseline : no dysuria Ext: + trace edema Objective: 
 
Vitals:  
 04/06/21 1942 04/06/21 2200 04/07/21 0450 04/07/21 7577 BP: 128/62 115/67 126/63 132/63 Pulse: 75 83 74 87 Resp: 18 18 18 18 Temp: 97.6 °F (36.4 °C) 97.7 °F (36.5 °C) 97.9 °F (36.6 °C) 98 °F (36.7 °C) SpO2: 98% 98% 98% 97% Weight:      
Height:      
 
 
PE 
Gen: in no acute distress, alert and oriented CV:reg rate, S1, S2, no Rub Chest:clear bilaterally Abd: soft, non tender, + BS, + ostomy Ext/Access: + BLE trace edema R>L [facility] Recent Labs 04/07/21 
7007 04/06/21 
0457 WBC 12.6* 11.7* HGB 8.9* 8.4* HCT 29.0* 26.9*  
* 98* Recent Labs 04/07/21 
8492 04/06/21 
0984 04/05/21 
7293  143 146*  
K 4.5 4.2 3.7 * 121* 122* CO2 16* 15* 13* GLU 83 112* 144* BUN 45* 43* 48* CREA 2.85* 2.76* 2.94* PHOS  --   --  2.2*  
CA 8.1* 7.8* 7.8* ALB  --   --  1.9* Radiology A/P:  
Patient Active Problem List  
Diagnosis Code  Acute renal failure (HCC) N17.9  Syncope R55  Orthostatic hypotension I95.1  History of ileostomy Z98.890  
 Dehydration E86.0  Anorexia R63.0  Weakness generalized R53.1  Fatigue R53.83  
 Malaise R53.81  
 S/P CABG x 4 Z95.1  Hypertension I10  
 History of ulcerative colitis Z87.19  
 Hypomagnesemia E83.42  
 Atherosclerosis of native coronary artery of native heart without angina pectoris I25.10  Adrenal insufficiency ([facility]) E27.40  LV dysfunction I51.9  Abnormal cardiovascular stress test R94.39  
 Chronic renal failure, stage 4 (severe) ([facility]) N18.4  Anemia D64.9  
 Ischemic cardiomyopathy I25.5  ARACELIS (acute kidney injury) ([facility] [address] N17.9  Atrial fibrillation (HCC) I48.91  
 Pulmonary interstitial fibrosis (HCC) J84.10  PAF (paroxysmal atrial fibrillation) (HCC) I48.0  Bursitis M71.9  Delirium R41.0 A on CKD4 - renal function stable, non oliguric, if renal function holds or improves tomorrow ok to d/c from renal stand point with oupt close Nephrology FU Metabolic acidosis- continue NaHCO3 
DW Dr. Ashlyn Osborne Pulm Fibrosis Thrombocytopenia Septic Bursitis - s/p I &D, abx per primary Orthostatic Hypotension Adrenal Insufficiency on steroids Debility- PT Vijaya Florentino NP

## 2021-04-07 NOTE — PROGRESS NOTES
ACUTE PHYSICAL THERAPY GOALS: 
(Developed with and agreed upon by patient and/or caregiver. ) LTG: 
(1.)Mr. Quach will move from supine to sit and sit to supine , scoot up and down and roll side to side in bed with STAND BY ASSIST within 7 treatment day(s). (2.)Mr. Quach will transfer from bed to chair and chair to bed with STAND BY ASSIST using the least restrictive device within 7 treatment day(s). (3.)Mr. Quach will ambulate with CONTACT GUARD ASSIST for 150+ feet with the least restrictive device within 7 treatment day(s). PHYSICAL THERAPY: Daily Note and AM Treatment Day # 3 Benny Maguire is a 78 y.o. male PRIMARY DIAGNOSIS: ARACELIS (acute kidney injury) ([facility] [address] ARACELIS (acute kidney injury) ([facility] [address] [N17.9] Procedure(s) (LRB): 
RIGHT KNEE INCISION AND DRAINAGE/RIGHT ELBOW WOUND FLUID ASPIRATION (Right) 5 Days Post-Op ASSESSMENT:  
 
REHAB RECOMMENDATIONS: CURRENT LEVEL OF FUNCTION: 
(Most Recently Demonstrated) Recommendation to date pending progress: 
Setting:  Short-term Rehab Equipment:  To Be Determined Bed Mobility:  Minimal Assistance Sit to Stand:  Minimal Assistance from an elevated bed Transfers:  Not tested Gait/Mobility:  Not tested ASSESSMENT: 
Mr. Yevonne Gitelman was supine in bed on arrival. He is agreeable to PT. Bed mobility with min A and extra time and verbal cues. Once sitting, he became slightly dizzy but better after sitting for a while. Sit to stand a couple times but became extremely dizzy each time. He began to perform BLE exercises but after 10 LAQs on each LE he scratched behind his knee and got a skin tear. RN called. Pt returned to supine with mod assist x2. SUBJECTIVE:  
Mr. Yevonne Gitelman states, \"ok. \" SOCIAL HISTORY/ LIVING ENVIRONMENT:  
Home Environment: Private residence One/Two Story Residence: One story Living Alone: Yes Support Systems: Child(delfino), Family member(s), Spouse/Significant Other/Partner OBJECTIVE:  
 
PAIN: VITAL SIGNS: LINES/DRAINS: Pre Treatment:   
Post Treatment: 0   IV 
O2 Device: Room air MOBILITY: I Mod I S SBA CGA Min Mod Max Total  NT x2 Comments:  
Bed Mobility Rolling [] [] [] [] [] [] [] [] [] [] [] Supine to Sit [] [] [] [] [] [x] [] [] [] [] [] Scooting [] [] [] [] [] [x] [] [] [] [] [] Sit to Supine [] [] [] [] [] [] [x] [] [] [] [x] Transfers Sit to Stand [] [] [] [] [] [x] [] [] [] [] [] Bed to Chair [] [] [] [] [] [] [] [] [] [x] [] Stand to Sit [] [] [] [] [x] [] [] [] [] [] [] I=Independent, Mod I=Modified Independent, S=Supervision, SBA=Standby Assistance, CGA=Contact Guard Assistance,  
Min=Minimal Assistance, Mod=Moderate Assistance, Max=Maximal Assistance, Total=Total Assistance, NT=Not Tested BALANCE: Good Fair+ Fair Fair- Poor NT Comments Sitting Static [x] [] [] [] [] [] Sitting Dynamic [x] [] [] [] [] []   
         
Standing Static [] [] [x] [x] [] []   
Standing Dynamic [] [] [] [x] [] [] GAIT: I Mod I S SBA CGA Min Mod Max Total  NT x2 Comments:  
Level of Assistance [] [] [] [] [] [] [] [] [] [] [] Distance NA   
DME Rolling Fleurette Amble Gait Quality NA Weightbearing Status N/A I=Independent, Mod I=Modified Independent, S=Supervision, SBA=Standby Assistance, CGA=Contact Guard Assistance,  
Min=Minimal Assistance, Mod=Moderate Assistance, Max=Maximal Assistance, Total=Total Assistance, NT=Not Tested PLAN:  
FREQUENCY/DURATION: PT Plan of Care: 3 times/week for duration of hospital stay or until stated goals are met, whichever comes first. 
TREATMENT:  
 
TREATMENT:  
($$ Therapeutic Activity: 38-52 mins    ) Therapeutic Activity (40 Minutes): Therapeutic activity included Supine to Sit, Sit to Supine, Scooting, Sitting balance  and Standing balance to improve functional Mobility, Strength and Activity tolerance. TREATMENT GRID: 
 Date: 
4/5/21 Date: 
 Date: Activity/Exercise Parameters Parameters Parameters Supine AP 20x B Supine heel slides 10x B    
Supine SAQ 10x B Supine hip abd 10x B    
     
     
     
 
AFTER TREATMENT POSITION/PRECAUTIONS: 
Bed, Needs within reach, RN notified and Visitors at bedside INTERDISCIPLINARY COLLABORATION: 
RN/PCT and PT/PTA TOTAL TREATMENT DURATION: 
PT Patient Time In/Time Out Time In: 1115 Time Out: 1155 Robyn Santos PTA

## 2021-04-07 NOTE — PROGRESS NOTES
[facility] Hospitalist Progress Note Name:  Blessing Wynn  Age:80 y.o. Sex:male :  1940 MRN:  [id number] Admit Date:  3/31/2021 Reason for Admission: 
ARACELIS (acute kidney injury) ([facility] [address] [N17.9] Hospital Course/Interval history:  
 
78 y.o. 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 intake and extreme weakness. 
  
He was found to have acute on chronic kidney injury and right knee cellulitis/deep tissue infection. He underwent I&D of his knee on April 3 and aspiration of his elbow with cultures growing MSSA. Abx ultimately switched to cefazolin. ID recommended transitioning patient to oral regimen on discharge and outpatient follow-up. Orthopedic surgeon recommended outpatient follow-up. For acute on chronic kidney injury, his kidney function improved significantly with IV fluid hydration. Fluid was stopped on  to see patient's p.o. intake and ability to maintain kidney function. Nephrology on board. Cardiology followed patient for atrial fibrillation. 
  
 - nephrology following, AM labs stable Assessment & Plan MSSA R knee prepatellar bursitis MSSA elbow olecranon bursitis 
- cont cefazolin while admitted 
- transition to Cefadroxil 500mg PO BID at discharge through 21  
- ID f/u  ARACELIS on CKD4 
- Cr stable around baseline 
- off IVFs 
- renal following 
- can d/c tomorrow if kidney function remains stable 
  
Delirium 
- started on Depakote after discussion with family on  by Dr. Lalo Woodruff 
- no improvement per daughter 
- avoid benzodiazepine.   Delirium precautions. 
  
Orthostatic hypotension 
- on midodrine and fludrocortisone 
  
 Afib 
- no anticoagulation per primary cardiologist due to falls 
- consider evaluation for Sophie Steven as outpt Rate control at present. 
   
 Adrenal Insufficiency  
- cont with home hydrocortisone. 
  
HFrEF 
- EF 25% on most recent ECHO 
- hypotension and orthostasis limiting HF medications 
  
CAD s/p CABG 
- cont ASA, statin 
- did not tolerate lopressor 
   
Acute on Chronic Anemia 
- B12 WNL, folate elevated 
- transfuse for Hgb<8 in setting of CAD 
- iron studies indicative of AoCD 
- TSH WNL 
  
GERD  
- cont PPI Sacral ulcer -  Wound care consult 
  
 
Diet:  DIET REGULAR 
DVT PPx: heparin SQ Code status: DNR Disposition/Expected LOS: likely to STR on 4/8 Subjective (04/07/21): No complaints. Has been drinking adequate amounts at the encouragement of daughter who has been at pt's bedside. Review of Systems: 14 point review of systems is otherwise negative with the exception of the elements mentioned above. Objective:  
 
Patient Vitals for the past 24 hrs: 
 Temp Pulse Resp BP SpO2  
04/07/21 0804 98 °F (36.7 °C) 87 18 132/63 97 % 04/07/21 0450 97.9 °F (36.6 °C) 74 18 126/63 98 % 04/06/21 2200 97.7 °F (36.5 °C) 83 18 115/67 98 % 04/06/21 1942 97.6 °F (36.4 °C) 75 18 128/62 98 % 04/06/21 1633 98 °F (36.7 °C) 65 17 113/61 99 % Oxygen Therapy O2 Sat (%): 97 % (04/07/21 0804) Pulse via Oximetry: 68 beats per minute (04/02/21 1135) O2 Device: Room air (04/07/21 0804) O2 Flow Rate (L/min): 2 l/min (04/02/21 1135) Body mass index is 21.25 kg/m². Physical Exam:  
General:  No acute distress,  
Lungs:  Normal respirations, speaking in full sentences, no use of   accessory muscles CV:  Regular rate and rhythm Abdomen:  Soft, nontender, nondistended, normoactive bowel sounds Extremities:  No cyanosis clubbing or edema Neuro:   Nonfocal 
Psych:  Baseline confusion Data Review: 
I have reviewed all labs, meds, and studies from the last 24 hours: 
 
Labs: 
 
Recent Results (from the past 24 hour(s)) METABOLIC PANEL, BASIC Collection Time: 04/07/21  4:32 AM  
Result Value Ref Range Sodium 142 138 - 145 mmol/L Potassium 4.5 3.5 - 5.1 mmol/L Chloride 118 (H) 98 - 107 mmol/L  
 CO2 16 (L) 21 - 32 mmol/L Anion gap 8 7 - 16 mmol/L Glucose 83 65 - 100 mg/dL BUN 45 (H) 8 - 23 MG/DL Creatinine 2.85 (H) 0.8 - 1.5 MG/DL  
 GFR est AA 27 (L) >60 ml/min/1.73m2 GFR est non-AA 23 (L) >60 ml/min/1.73m2 Calcium 8.1 (L) 8.3 - 10.4 MG/DL  
CBC W/O DIFF Collection Time: 04/07/21  4:32 AM  
Result Value Ref Range WBC 12.6 (H) 4.3 - 11.1 K/uL  
 RBC 2.68 (L) 4.23 - 5.6 M/uL HGB 8.9 (L) 13.6 - 17.2 g/dL HCT 29.0 (L) 41.1 - 50.3 % .2 (H) 79.6 - 97.8 FL  
 MCH 33.2 (H) 26.1 - 32.9 PG  
 MCHC 30.7 (L) 31.4 - 35.0 g/dL  
 RDW 17.7 (H) 11.9 - 14.6 % PLATELET 058 (L) 446 - 450 K/uL MPV 12.2 9.4 - 12.3 FL ABSOLUTE NRBC 0.05 0.0 - 0.2 K/uL All Micro Results Procedure Component Value Units Date/Time Chaz Perales [[id number]] Collected: 04/02/21 1039 Order Status: Completed Specimen: Knee  Updated: 04/05/21 6147 Special Requests: --     
  RIGHT PATELLA PUS Culture result: SUBCULTURE IS NECESSARY TO DETERMINE PRESENCE OR ABSENCE OF ANAEROBIC BACTERIA IN THIS CULTURE. FURTHER REPORT TO FOLLOW AFTER INCUBATION OF SUBCULTURE. Chaz Perales [[id number]] Collected: 04/02/21 1045 Order Status: Completed Specimen: Elbow Updated: 04/05/21 6039 Special Requests: RIGHT Culture result: SUBCULTURE IS NECESSARY TO DETERMINE PRESENCE OR ABSENCE OF ANAEROBIC BACTERIA IN THIS CULTURE. FURTHER REPORT TO FOLLOW AFTER INCUBATION OF SUBCULTURE. CULTURE, BLOOD [[id number]] Collected: 03/31/21 2135 Order Status: Completed Specimen: Blood Updated: 04/05/21 0703 Special Requests: --     
  RIGHT 
HAND   Culture result: NO GROWTH 5 DAYS     
 CULTURE, BODY FLUID Vear Chai STAIN [[id number]]  (Abnormal) (Susceptibility) Collected: 04/02/21 1039 Order Status: Completed Specimen: Knee  Updated: 04/04/21 9860 Special Requests: --     
  RIGHT PATELLA PUS 
  
  GRAM STAIN    
  WBCS SEEN TOO NUMEROUS TO COUNT MODERATE GRAM POSITIVE COCCI Culture result: MODERATE STAPHYLOCOCCUS AUREUS  
     
 CULTURE, BODY FLUID [address] [[id number]]  (Abnormal)  (Susceptibility) Collected: 04/02/21 1045 Order Status: Completed Specimen: Elbow Updated: 04/04/21 8677 Special Requests: RIGHT     
  GRAM STAIN    
  WBCS SEEN TOO NUMEROUS TO COUNT MODERATE GRAM POSITIVE COCCI Culture result:    
  HEAVY STAPHYLOCOCCUS AUREUS  
     
 CULTURE, Min Corrente STAIN [[id number]] Collected: 04/02/21 1215 Order Status: Canceled Specimen: Wound from Knee CULTURE, ANAEROBIC [[id number]] Collected: 04/02/21 1115 Order Status: Canceled Specimen: Surgical Specimen Tommy Palm STAIN [[id number]] Collected: 04/02/21 1045 Order Status: Canceled Specimen: Wound from Elbow EKG Results Procedure 720 Value Units Date/Time EKG 12 LEAD INITIAL [[id number]] Collected: 03/31/21 1508 Order Status: Completed Updated: 04/01/21 4591 Ventricular Rate 96 BPM   
  Atrial Rate 50 BPM   
  QRS Duration 138 ms Q-T Interval 390 ms QTC Calculation (Bezet) 492 ms Calculated R Axis -54 degrees Calculated T Axis 109 degrees Diagnosis --  
  !! AGE AND GENDER SPECIFIC ECG ANALYSIS !! Atrial fibrillation with premature ventricular or aberrantly conducted  
complexes Left axis deviation Right bundle branch block T wave abnormality, consider lateral ischemia or digitalis effect Abnormal ECG When compared with ECG of 02-MAR-2020 07:02, Atrial fibrillation has replaced Sinus rhythm Right bundle branch block is now Present Confirmed by Hernandez Carbajal MD (), Gen Singh ([id number]) on 4/1/2021 7:39:22 AM 
  
 EKG, 12 LEAD, INITIAL [[id number]] Order Status: Completed Other Studies: No results found. Current Meds:  
Current Facility-Administered Medications Medication Dose Route Frequency  midodrine (PROAMATINE) tablet 2.5 mg  2.5 mg Oral TID WITH MEALS  divalproex (DEPAKOTE SPRINKLE) capsule 125 mg  125 mg Oral Q12H  ceFAZolin (ANCEF) 2 g/20 mL in sterile water IV syringe  2 g IntraVENous Q12H  
 heparin (porcine) injection 5,000 Units  5,000 Units SubCUTAneous Q8H  
 acetaminophen (TYLENOL) tablet 650 mg  650 mg Oral Q8H  
 oxyCODONE IR (ROXICODONE) tablet 2.5 mg  2.5 mg Oral Q6H PRN  
 sodium bicarbonate tablet 650 mg  650 mg Oral TID  dorzolamide-timoloL (COSOPT) 22.3-6.8 mg/mL ophthalmic solution 1 Drop  1 Drop Both Eyes BID  netarsudiL 0.02 % drop 1 Drop (Patient Supplied)  1 Drop Both Eyes QHS  hydrocortisone (CORTEF) tablet 20 mg  20 mg Oral ACB And  
 hydrocortisone (CORTEF) tablet 10 mg  10 mg Oral BID WITH MEALS  sodium chloride (NS) flush 5-40 mL  5-40 mL IntraVENous Q8H  
 sodium chloride (NS) flush 5-40 mL  5-40 mL IntraVENous PRN  polyethylene glycol (MIRALAX) packet 17 g  17 g Oral DAILY PRN  
 bisacodyL (DULCOLAX) suppository 10 mg  10 mg Rectal DAILY PRN  promethazine (PHENERGAN) tablet 25 mg  25 mg Oral Q6H PRN Or  
 ondansetron (ZOFRAN) injection 4 mg  4 mg IntraVENous Q6H PRN  
 famotidine (PEPCID) tablet 20 mg  20 mg Oral BID PRN  
 aspirin chewable tablet 81 mg  81 mg Oral DAILY  atorvastatin (LIPITOR) tablet 40 mg  40 mg Oral QHS  buPROPion (WELLBUTRIN) tablet 100 mg  100 mg Oral DAILY  pantoprazole (PROTONIX) tablet 40 mg  40 mg Oral DAILY  fludrocortisone (FLORINEF) tablet 0.2 mg  0.2 mg Oral BID  metoprolol (LOPRESSOR) injection 2.5 mg  2.5 mg IntraVENous Q4H PRN Problem List: 
Hospital Problems as of 4/7/2021 Date Reviewed: 4/2/2021 Codes Class Noted - Resolved POA  Pulmonary interstitial fibrosis ([facility] [address] ICD-10-CM: J84.10 ICD-9-CM: 508  4/1/2021 - Present Unknown PAF (paroxysmal atrial fibrillation) (HCC) ICD-10-CM: I48.0 ICD-9-CM: 427.31  4/1/2021 - Present Unknown * (Principal) ARACELIS (acute kidney injury) ([facility] [address] ICD-10-CM: N17.9 ICD-9-CM: 584.9  3/31/2021 - Present Unknown Atrial fibrillation [facility]) ICD-10-CM: I48.91 
ICD-9-CM: 427.31  3/31/2021 - Present Anemia ICD-10-CM: D64.9 ICD-9-CM: 285.9  11/12/2020 - Present Yes Ischemic cardiomyopathy ICD-10-CM: I25.5 ICD-9-CM: 414.8  11/12/2020 - Present Yes Overview Signed 2/3/2021  4:15 PM by Anil Pruitt MD  
  Jan 2021- Echo - 4 chamber dilatation, EF 24%, mild MR/TR Orthostatic hypotension ICD-10-CM: I95.1 ICD-9-CM: 458.0  10/21/2014 - Present Yes S/P CABG x 4 ICD-10-CM: Z95.1 ICD-9-CM: V45.81  10/21/2014 - Present Yes Overview Signed 10/21/2014  1:56 AM by Porter Boateng NP  
  AND Kamila Tan Part of this note was written by using a voice dictation software and the note has been proof read but may still contain some grammatical/other typographical errors. Signed By: Isaac Ayala MD  
[employer] [facility]  April 7, 2021  5:15 PM

## 2021-04-08 NOTE — DISCHARGE SUMMARY
[facility] Hospitalist Discharge Summary Name:  Gil Betancourt    Age:80 y.o. Sex:male :  1940 MRN:  [id number] Admitting Physician: Kd Miller MD Admit Date: 3/31/2021  2:48 PM  
Attending Physician: Jose Thayer MD 
Primary Care Physician: Radha Smith MD  
 
 
Discharge Physician: Ulysses Lemus MD  Discharge date: 21 Discharged Condition: Stable Indication for Admission: Chief Complaint Patient presents with  Hypotension Reasons for hospitalization: 
Hospital Problems as of 2021 Date Reviewed: 2021 Codes Class Noted - Resolved POA Bursitis ICD-10-CM: M71.9 ICD-9-CM: 727.3  2021 - Present Unknown Delirium ICD-10-CM: R41.0 ICD-9-CM: 780.09  2021 - Present Unknown Pulmonary interstitial fibrosis ([facility] ICD-10-CM: J84.10 ICD-9-CM: 759  2021 - Present Unknown PAF (paroxysmal atrial fibrillation) (HCC) ICD-10-CM: I48.0 ICD-9-CM: 427.31  2021 - Present Unknown * (Principal) ARACELIS (acute kidney injury) ([facility] ICD-10-CM: N17.9 ICD-9-CM: 584.9  3/31/2021 - Present Unknown Atrial fibrillation [facility]) ICD-10-CM: I48.91 
ICD-9-CM: 427.31  3/31/2021 - Present Anemia ICD-10-CM: D64.9 ICD-9-CM: 285.9  2020 - Present Yes Ischemic cardiomyopathy ICD-10-CM: I25.5 ICD-9-CM: 414.8  2020 - Present Yes Overview Signed 2/3/2021  4:15 PM by Gertrudis Darby MD  
  2021- Echo - 4 chamber dilatation, EF 24%, mild MR/TR Adrenal insufficiency ([facility] ICD-10-CM: E27.40 ICD-9-CM: 255.41  2019 - Present Unknown Orthostatic hypotension ICD-10-CM: I95.1 ICD-9-CM: 458.0  10/21/2014 - Present Yes S/P CABG x 4 ICD-10-CM: Z95.1 ICD-9-CM: V45.81  10/21/2014 - Present Yes Overview Signed 10/21/2014  1:56 AM by Amador Guo NP  
  AND Hernan Crowe Discharge Diagnosis: MSSA bursitis, ARACELIS Did Patient have Sepsis (YES OR NO): no  
 
 
Hospital Course/Interval history: 
78 y. o. male with medical h/o CAD s/p CABG, HFrEF (EF 25%), stage IV CKD, B cell lymphoma, iron deficiency anemia, thrombocytopenia, adrenal insufficiency secondary to cancer therapy with chronic orthostatic hypotension, ulcerative colitis status post ileostomy presented to emergency room from primary care office secondary to decreased p.o. intake and extreme weakness. 
  
He was found to have acute on chronic kidney injury and right knee cellulitis/deep tissue infection.  He underwent I&D of his knee on April 3 and aspiration of his elbow with cultures growing MSSA.  Abx ultimately switched to cefazolin.  ID recommended transitioning patient to oral regimen on discharge and outpatient follow-up.  Orthopedic surgeon recommended outpatient follow-up.  For acute on chronic kidney injury, his kidney function improved significantly with IV fluid hydration.  Fluid was stopped on April 6 to see patient's p.o. intake and ability to maintain kidney function.  Nephrology on board.  Cardiology followed patient for atrial fibrillation. Assessment/Plan: MSSA R knee prepatellar bursitis MSSA elbow olecranon bursitis 
- cont cefazolin while inpt 
- transitioned to Cefadroxil 500mg PO BID at discharge through 4/23/21  
- ID f/u 4/22 
  
ARACELIS on CKD4 
- Cr stable around baseline 
- renal following 
- needs f/u labs in 1 week 
  
Delirium 
- started on Depakote after discussion with family on 4/6 by Dr. Jay Mak 
- no improvement per daughter 
- avoid benzodiazepine.  Delirium precautions. 
  
Orthostatic hypotension 
- on midodrine and fludrocortisone 
  
 Afib 
- no anticoagulation per primary cardiologist due to falls 
- consider evaluation for Cloretta Cue as outpt Rate control at present. 
   
 Adrenal Insufficiency  
- cont with home hydrocortisone. 
  
HFrEF 
- EF 25% on most recent ECHO 
- hypotension and orthostasis limiting HF medications 
  
CAD s/p CABG 
- cont ASA, statin - did not tolerate lopressor 
   
Acute on Chronic Anemia 
- B12 WNL, folate elevated 
- iron studies indicative of AoCD 
- TSH WNL 
  
GERD  
- cont PPI Sacral ulcer -  Wound care consult 
  
 
Consults:  
IP CONSULT TO CARDIOLOGY 
IP CONSULT TO NEPHROLOGY 
IP CONSULT TO INFECTIOUS DISEASES 
IP CONSULT TO ORTHOPEDIC SURGERY Disposition: [place] Diet: DIET REGULAR Code Status: DNR Follow up labs/imaging: BMP in 1 week Follow up with PCP in 1 week, ID on 4/22 Pending labs/studies: none Current Discharge Medication List  
  
START taking these medications Details  
divalproex (DEPAKOTE SPRINKLE) 125 mg capsule Take 1 Cap by mouth every twelve (12) hours. Qty: 60 Cap, Refills: 0  
  
midodrine (PROAMATINE) 2.5 mg tablet Take 1 Tab by mouth three (3) times daily (with meals) for 30 days. Qty: 90 Tab, Refills: 0  
  
cefadroxil (DURICEF) 1 gram tablet Take 0.5 Tabs by mouth two (2) times a day for 15 days. Qty: 15 Tab, Refills: 0 CONTINUE these medications which have CHANGED Details  
sodium bicarbonate 650 mg tablet Take 1 Tab by mouth three (3) times daily. Qty: 90 Tab, Refills: 0 CONTINUE these medications which have NOT CHANGED Details  
dorzolamide-timoloL (Cosopt) 22.3-6.8 mg/mL ophthalmic solution Administer 1 Drop to both eyes two (2) times a day. atorvastatin (Lipitor) 40 mg tablet Take 1 Tab by mouth nightly. Qty: 90 Tab, Refills: 3 Associated Diagnoses: Ischemic cardiomyopathy; Coronary artery disease involving native coronary artery of native heart without angina pectoris; S/P CABG x 4  
  
hydrocortisone (CORTEF) 10 mg tablet Take 10 mg by mouth three (3) times daily. Takes 2 tablets in the am, 1 tablet at lunch & 1 tablet in the pm  
  
fludrocortisone (FLORINEF) 0.1 mg tablet Take 0.2 mg by mouth two (2) times a day. Takes 0.2mg oral/ Gastric tube twice a day buPROPion SR (WELLBUTRIN SR) 100 mg SR tablet Take  by mouth daily. folic acid 676 mcg tablet Take 800 mcg by mouth daily. metoprolol succinate (TOPROL-XL) 25 mg XL tablet Take 1 Tab by mouth daily. Qty: 30 Tab, Refills: 3  
  
pantoprazole (Protonix) 40 mg tablet Take 40 mg by mouth daily. netarsudiL (Rhopressa) 0.02 % drop Apply  to eye.  
  
nitroglycerin (NITROSTAT) 0.4 mg SL tablet 1 Tab by SubLINGual route every five (5) minutes as needed for Chest Pain. Qty: 10 Bottle, Refills: 2 Associated Diagnoses: Ischemic cardiomyopathy; Coronary artery disease involving native coronary artery of native heart without angina pectoris; S/P CABG x 4  
  
aspirin 81 mg chewable tablet Take 1 Tab by mouth.  
  
latanoprost (XALATAN) 0.005 % ophthalmic solution Administer 1 drop to both eyes nightly. ascorbic acid (VITAMIN C) 500 mg tablet Take 500 mg by mouth daily. STOP taking these medications  
  
 apixaban (Eliquis) 5 mg tablet Comments:  
Reason for Stopping:   
   
 gabapentin (NEURONTIN) 100 mg capsule Comments:  
Reason for Stopping:   
   
 potassium chloride SR (KLOR-CON 10) 10 mEq tablet Comments:  
Reason for Stopping:   
   
 torsemide (DEMADEX) 20 mg tablet Comments:  
Reason for Stopping:   
   
 amoxicillin (AMOXIL) 500 mg capsule Comments:  
Reason for Stopping:   
   
  
 
 
Medications Discontinued During This Encounter Medication Reason  sodium bicarbonate tablet 650 mg   
 heparin (porcine) injection 5,000 Units  hydrocortisone (CORTEF) tablet 10 mg   
 lidocaine (XYLOCAINE) 10 mg/mL (1 %) injection 0.1 mL Patient Transfer  lactated Ringers infusion Patient Transfer  sodium chloride (NS) flush 5-40 mL Patient Transfer  sodium chloride (NS) flush 5-40 mL Patient Transfer  famotidine (PEPCID) tablet 20 mg Patient Transfer  acetaminophen (TYLENOL) tablet 1,000 mg Patient Transfer  fentaNYL citrate (PF) injection 100 mcg Patient Transfer  lactated Ringers infusion Patient Transfer  
 0.9% sodium chloride infusion Patient Transfer  acetaminophen (TYLENOL) tablet 1,000 mg Patient Transfer  
 HYDROcodone-acetaminophen (NORCO) 5-325 mg per tablet 1 Tab Patient Transfer  
 HYDROmorphone (DILAUDID) injection 0.5 mg Patient Transfer  promethazine (PHENERGAN) with saline injection 3.25 mg Patient Transfer  lidocaine-EPINEPHrine (XYLOCAINE) 1 %-1:100,000 injection Patient Transfer  hydrocortisone sod succ (PF) (SOLU-CORTEF) 50 mg in 0.9% sodium chloride 50 mL IVPB DUPLICATE ORDER  
 doxycycline (VIBRAMYCIN) 100 mg in 0.9% sodium chloride (MBP/ADV) 100 mL MBP  doxycycline (VIBRAMYCIN) capsule 100 mg   
 vancomycin (VANCOCIN) in 0.9% sodium chloride 250 mL infusion DOSE ADJUSTMENT  vancomycin (VANCOCIN) 1750 mg in  ml infusion  Vancomycin Intermittent Dosing per Pharmacy  DAPTOmycin (CUBICIN) 463 mg in 0.9% sodium chloride 50 mL IVPB DOSE ADJUSTMENT  acetaminophen (TYLENOL) tablet 650 mg   
 acetaminophen (TYLENOL) suppository 650 mg   
 cefTRIAXone (ROCEPHIN) 1 g in 0.9% sodium chloride (MBP/ADV) 50 mL MBP  DAPTOmycin (CUBICIN) 500 mg in 0.9% sodium chloride 10 mL IV Syringe  dextrose 5 % - 0.45% NaCl infusion  metoprolol tartrate (LOPRESSOR) tablet 12.5 mg   
 sodium bicarbonate 650 mg tablet REORDER Follow Up Orders: Follow-up Appointments Procedures  FOLLOW UP VISIT Appointment in: Two Weeks Please set up hosp FU with [facility] Nephrology upon hosp d/c with renal panel and cbc w/o diff prior to appointment. [phone] Thanks Please set up hosp FU with [facility] upon hosp d/c with renal panel and cbc w/o diff prior to appointment. [phone] Thanks Standing Status:   Standing Number of Occurrences:   1 Order Specific Question:   Appointment in Answer: Two Weeks Follow-up Information Follow up With Specialties Details Why Contact Info  [facility]   4/22/21 @ 9:30am [address] [facility], [facility] [address] [facility] [phone] [address] [facility] [place]) [address]   [address] [facility] [address] [id number] 
[phone] Dagoberto Barclay MD Internal Medicine   [address] [place] [address] 
[phone] Discharge Exam: 
 
Patient Vitals for the past 24 hrs: 
 Temp Pulse Resp BP SpO2  
04/08/21 0809 97.4 °F (36.3 °C) 87 19 135/79 98 % 04/08/21 0431 97.8 °F (36.6 °C) 75 19 127/73 98 % 04/07/21 2206 98 °F (36.7 °C) 82 18 (!) 133/55 97 % 04/07/21 1955 97.4 °F (36.3 °C) 80 18 (!) 145/54 96 % 04/07/21 1705 97.4 °F (36.3 °C) 72 18 110/62 96 % 04/07/21 1216 97.8 °F (36.6 °C) 69 18 124/65 98 % Oxygen Therapy O2 Sat (%): 98 % (04/08/21 0809) Pulse via Oximetry: 68 beats per minute (04/02/21 1135) O2 Device: Room air (04/08/21 0809) O2 Flow Rate (L/min): 2 l/min (04/02/21 1135) Estimated body mass index is 21.25 kg/m² as calculated from the following: 
  Height as of this encounter: 6' 3\" (1.905 m). Weight as of this encounter: 77.1 kg (170 lb). Intake/Output Summary (Last 24 hours) at 4/8/2021 1148 Last data filed at 4/8/2021 1053 Gross per 24 hour Intake  Output 1350 ml Net -1350 ml *Note that automatically entered I/Os may not be accurate; dependent on patient compliance with collection and accurate  by assistants. Physical Exam:  
General:          No acute distress,  
Lungs:             Normal respirations, speaking in full sentences, no use of                accessory muscles CV:                  Regular rate and rhythm Abdomen:        Soft, nontender, nondistended, normoactive bowel sounds Extremities:     No cyanosis clubbing or edema Neuro:                         Nonfocal 
Psych:             Baseline confusion 
  
 
 
All Labs from Last 24 Hrs: 
Recent Results (from the past 24 hour(s)) METABOLIC PANEL, BASIC  Collection Time: 04/08/21  3:45 AM  
Result Value Ref Range Sodium 141 138 - 145 mmol/L Potassium 4.0 3.5 - 5.1 mmol/L Chloride 117 (H) 98 - 107 mmol/L  
 CO2 16 (L) 21 - 32 mmol/L Anion gap 8 7 - 16 mmol/L Glucose 86 65 - 100 mg/dL BUN 47 (H) 8 - 23 MG/DL Creatinine 2.73 (H) 0.8 - 1.5 MG/DL  
 GFR est AA 29 (L) >60 ml/min/1.73m2 GFR est non-AA 24 (L) >60 ml/min/1.73m2 Calcium 8.1 (L) 8.3 - 10.4 MG/DL  
CBC W/O DIFF Collection Time: 04/08/21  3:45 AM  
Result Value Ref Range WBC 10.4 4.3 - 11.1 K/uL  
 RBC 2.49 (L) 4.23 - 5.6 M/uL HGB 8.4 (L) 13.6 - 17.2 g/dL HCT 26.4 (L) 41.1 - 50.3 % .0 (H) 79.6 - 97.8 FL  
 MCH 33.7 (H) 26.1 - 32.9 PG  
 MCHC 31.8 31.4 - 35.0 g/dL  
 RDW 17.2 (H) 11.9 - 14.6 % PLATELET 861 (L) 526 - 450 K/uL MPV 12.5 (H) 9.4 - 12.3 FL ABSOLUTE NRBC 0.04 0.0 - 0.2 K/uL MAGNESIUM Collection Time: 04/08/21  3:45 AM  
Result Value Ref Range Magnesium 1.7 (L) 1.8 - 2.4 mg/dL All Micro Results Procedure Component Value Units Date/Time COVID-19 RAPID TEST [[id number]] Order Status: Sent Moody Cole [[id number]] Collected: 04/02/21 1039 Order Status: Completed Specimen: Knee  Updated: 04/07/21 1046 Special Requests: --     
  RIGHT PATELLA PUS Culture result:    
  NO ANAEROBES ISOLATED 5 DAYS  
     
 CULTURE, ANAEROBIC [[id number]] Collected: 04/02/21 1045 Order Status: Completed Specimen: Elbow Updated: 04/07/21 1046 Special Requests: RIGHT Culture result:    
  NO ANAEROBES ISOLATED 5 DAYS  
     
 CULTURE, BLOOD [[id number]] Collected: 03/31/21 2135 Order Status: Completed Specimen: Blood Updated: 04/05/21 0703 Special Requests: --     
  RIGHT 
HAND Culture result: NO GROWTH 5 DAYS     
 CULTURE, BODY FLUID Bhakta Arms STAIN [[id number]]  (Abnormal)  (Susceptibility) Collected: 04/02/21 1039 Order Status: Completed Specimen: Knee  Updated: 04/04/21 9905 Special Requests: --     
  RIGHT PATELLA PUS 
  
  GRAM STAIN    
 WBCS SEEN TOO NUMEROUS TO COUNT MODERATE GRAM POSITIVE COCCI Culture result: MODERATE STAPHYLOCOCCUS AUREUS  
     
 CULTURE, BODY FLUID W Charlie Suazo [address] [[id number]]  (Abnormal)  (Susceptibility) Collected: 04/02/21 1045 Order Status: Completed Specimen: Elbow Updated: 04/04/21 2058 Special Requests: RIGHT     
  GRAM STAIN    
  WBCS SEEN TOO NUMEROUS TO COUNT MODERATE GRAM POSITIVE COCCI Culture result:    
  HEAVY STAPHYLOCOCCUS AUREUS  
     
 CULTURE, Huma Kennel STAIN [[id number]] Collected: 04/02/21 1215 Order Status: Canceled Specimen: Wound from Knee CULTURE, ANAEROBIC [[id number]] Collected: 04/02/21 1115 Order Status: Canceled Specimen: Surgical Specimen Brijesh Bile STAIN [[id number]] Collected: 04/02/21 1045 Order Status: Canceled Specimen: Wound from Elbow SARS-CoV-2 Lab Results \"Novel Coronavirus\" Test: No results found for: COV2NT \"Emergent Disease\" Test: No results found for: EDPR \"SARS-COV-2\" Test: No results found for: XGCOVT Rapid Test:  
No results found for: COVR Diagnostic Imaging/Tests: No results found. Echocardiogram/EKG results: No results found for this visit on 03/31/21. EKG Results Procedure 720 Value Units Date/Time EKG 12 LEAD INITIAL [[id number]] Collected: 03/31/21 1508 Order Status: Completed Updated: 04/01/21 5074 Ventricular Rate 96 BPM   
  Atrial Rate 50 BPM   
  QRS Duration 138 ms Q-T Interval 390 ms QTC Calculation (Bezet) 492 ms Calculated R Axis -54 degrees Calculated T Axis 109 degrees Diagnosis --  
  !! AGE AND GENDER SPECIFIC ECG ANALYSIS !! Atrial fibrillation with premature ventricular or aberrantly conducted  
complexes Left axis deviation Right bundle branch block T wave abnormality, consider lateral ischemia or digitalis effect Abnormal ECG When compared with ECG of 02-MAR-2020 07:02, Atrial fibrillation has replaced Sinus rhythm Right bundle branch block is now Present Confirmed by Lisa Bennett MD (), [place] ([id number]) on 4/1/2021 7:39:22 AM 
  
 EKG, 12 LEAD, INITIAL [[id number]] Order Status: Completed Results for orders placed or performed during the hospital encounter of 03/31/21 EKG, 12 LEAD, INITIAL Result Value Ref Range Ventricular Rate 96 BPM  
 Atrial Rate 50 BPM  
 QRS Duration 138 ms Q-T Interval 390 ms QTC Calculation (Bezet) 492 ms Calculated R Axis -54 degrees Calculated T Axis 109 degrees Diagnosis    
  !! AGE AND GENDER SPECIFIC ECG ANALYSIS !! Atrial fibrillation with premature ventricular or aberrantly conducted  
complexes Left axis deviation Right bundle branch block T wave abnormality, consider lateral ischemia or digitalis effect Abnormal ECG When compared with ECG of 02-MAR-2020 07:02, Atrial fibrillation has replaced Sinus rhythm Right bundle branch block is now Present Confirmed by Lisa Bennett MD (), [place] ([id number]) on 4/1/2021 7:39:22 AM 
  
 
 
Procedures done this admission: 
Procedure(s): RIGHT KNEE INCISION AND DRAINAGE/RIGHT ELBOW WOUND FLUID ASPIRATION Time spent in patient discharge planning and coordination 34 minutes. Signed By: Stan Zayas MD  
[facility]  April 8, 2021  5:07 PM

## 2021-04-08 NOTE — PROGRESS NOTES
[facility] Subjective: A on CKD Patient seen and examined on rounds, spouse at the bedside, reports elbow pain controlled, good uop, better intake. Labs and chart reviewed- renal function holding off IVF Review of Systems -  
General: no fever/chills, appetite ok CV: No CP Lung: no SOB, no cough GI: no N/V, ostomy output at baseline : no dysuria Ext: + trace edema Objective: 
 
Vitals:  
 04/07/21 1955 04/07/21 2206 04/08/21 0431 04/08/21 1733 BP: (!) 145/54 (!) 133/55 127/73 135/79 Pulse: 80 82 75 87 Resp: 18 18 19 19 Temp: 97.4 °F (36.3 °C) 98 °F (36.7 °C) 97.8 °F (36.6 °C) 97.4 °F (36.3 °C) SpO2: 96% 97% 98% 98% Height:      
 
 
PE 
Gen: in no acute distress, alert and oriented CV:reg rate, S1, S2, no Rub Chest:clear bilaterally Abd: soft, non tender, + BS, + ostomy Ext/Access: + BLE trace edema R>L [facility] Recent Labs 04/08/21 
0345 04/07/21 
7155 04/06/21 
0288 WBC 10.4 12.6* 11.7* HGB 8.4* 8.9* 8.4* HCT 26.4* 29.0* 26.9*  
* 111* 98* Recent Labs 04/08/21 
0345 04/07/21 
1635 04/06/21 
5227  142 143  
K 4.0 4.5 4.2 * 118* 121* CO2 16* 16* 15* GLU 86 83 112* BUN 47* 45* 43* CREA 2.73* 2.85* 2.76* MG 1.7*  --   --   
CA 8.1* 8.1* 7.8* Radiology A/P:  
Patient Active Problem List  
Diagnosis Code  Acute renal failure (HCC) N17.9  Syncope R55  Orthostatic hypotension I95.1  History of ileostomy Z98.890  
 Dehydration E86.0  Anorexia R63.0  Weakness generalized R53.1  Fatigue R53.83  
 Malaise R53.81  
 S/P CABG x 4 Z95.1  Hypertension I10  
 History of ulcerative colitis Z87.19  
 Hypomagnesemia E83.42  
 Atherosclerosis of native coronary artery of native heart without angina pectoris I25.10  Adrenal insufficiency ([facility]) E27.40  LV dysfunction I51.9  Abnormal cardiovascular stress test R94.39  
 Chronic renal failure, stage 4 (severe) ([facility]) N18.4  Anemia D64.9  
 Ischemic cardiomyopathy I25.5  ARACELIS (acute kidney injury) ([facility] N17.9  Atrial fibrillation (HCC) I48.91  
 Pulmonary interstitial fibrosis (HCC) J84.10  PAF (paroxysmal atrial fibrillation) (HCC) I48.0  Bursitis M71.9  Delirium R41.0 A on CKD4 - renal function stable, non oliguric 
-ok to d/c from renal stand point with oupt close Nephrology FU Metabolic acidosis- continue NaHCO3 
DW Dr. Tj Baum Pulm Fibrosis Thrombocytopenia Septic Bursitis - s/p I &D, abx per primary Orthostatic Hypotension Adrenal Insufficiency on steroids Debility- PT Bear Fried, NP

## 2021-04-08 NOTE — PROGRESS NOTES
Patient has been accepted @ [facility] - plan is for patient to d/c to [facility] later today - CM Following. Will update with bed assignment.  
 
-----Update----- Patient will go to room [address] @ [address]. [facility] will pick patient up at 3pm to transport. Notified patient and family. No concerns at this time. Care Management Interventions PCP Verified by CM: Yes Mode of Transport at Discharge: BLS Transition of Care Consult (CM Consult): SNF Partner SNF: Yes Discharge Durable Medical Equipment: No 
Physical Therapy Consult: Yes Occupational Therapy Consult: Yes Speech Therapy Consult: No 
Current Support Network: Lives with Spouse Confirm Follow Up Transport: Family The Patient and/or Patient Representative was Provided with a Choice of Provider and Agrees with the Discharge Plan?: Yes Freedom of Choice List was Provided with Basic Dialogue that Supports the Patient's Individualized Plan of Care/Goals, Treatment Preferences and Shares the Quality Data Associated with the Providers?: Yes [employer] Information Provided?: No 
Discharge Location Discharge Placement: Skilled nursing facility([facility])

## 2021-04-08 NOTE — PROGRESS NOTES
Report given to RN at [facility]. Opportunity for questions and clarity given. IV removed. Pt transport pickup for 15:00.

## 2021-04-08 NOTE — PROGRESS NOTES
[facility] CARDIOLOGY PROGRESS NOTE 
      
 
4/8/2021 1:40 PM 
 
Admit Date: 3/31/2021 Subjective:  
Seen with wife at bedside. Still with weakness and dizziness with standing, no other complaints. No dyspnea, orthopnea. ROS: 
Cardiovascular:  As noted above Objective:  
  
Vitals:  
 04/07/21 2206 04/08/21 0431 04/08/21 0809 04/08/21 1204 BP: (!) 133/55 127/73 135/79 131/66 Pulse: 82 75 87 89 Resp: 18 19 19 19 Temp: 98 °F (36.7 °C) 97.8 °F (36.6 °C) 97.4 °F (36.3 °C) 97.9 °F (36.6 °C) SpO2: 97% 98% 98% 96% Height:      
 
 
Physical Exam: 
General-No Acute Distress, lying supine Neck- supple, no JVD 
CV- regular rate and rhythm no MRG Lung- clear bilaterally without wheezes Abd- soft, nontender, nondistended Ext- no edema bilaterally in legs Skin- warm and dry Data Review:  
Recent Labs 04/08/21 
0345 04/07/21 
5648  142  
K 4.0 4.5 MG 1.7*  --   
BUN 47* 45* CREA 2.73* 2.85* GLU 86 83 WBC 10.4 12.6* HGB 8.4* 8.9* HCT 26.4* 29.0*  
* 111* Assessment/Plan:  
 
Principal Problem: 
  ARACELIS (acute kidney injury) ([facility] (3/31/2021) - pre renal , improved with fluid resuscitation - Cr 2.7 today  
  
Active Problems: 
  Orthostatic hypotension (10/21/2014) -Continue to hold diuresis , IV / PO fluids as tolerated/needed 
  - renal function stable - SPEP normal  
  -Stated adrenal insufficiency ws outpatient; noted on hydrocortisone/Florinef prior to admission and defer to primary team.  Regimen needs to be reassessed in the setting of severe left ventricular dysfunction 
  
  S/P CABG x 4 (10/21/2014) - cont ASA, statin  
  
  Anemia (11/12/2020) 
  - monitor , stable , off anticoagulation given anemia / falls with injuries  
  
  Ischemic cardiomyopathy (11/12/2020) 
  - LVEF 20-25% 
  - on low dose metoprolol XL 25 at home; hold off therapy at this time in the setting of low BPs limiting therapy.   Also no BP room for ARB/ACE inhibitor/MRA , furthermore renal function will not allow at this time  
  
  PA[facility] ([facility]oxysmal [facility] fibrillation) [facility] (4/1/2021) 
  - anticoagulation held due to anemia, falls with injuries - rate control strategy , at this time off metoprolol due to low BP 
  
  Pulmonary interstitial fibrosis ([facility] (4/1/2021) - per primary Orvis Pippins, DO 
4/8/2021 1:40 PM

## 2025-02-28 NOTE — H&P
Ana Flynn MD  
Physician  
Cardiology Progress Notes     
Signed Encounter Date:  2020 []Hide copied text []Daniela for details 
 
  
  
[facility] CARDIOLOGY 
[address], [address] [place] [address] PHONE: [phone] 
  
  
20 
  
NAME:  Lori Jimenez : 1940 MRN: [id number]  
  
  
  
SUBJECTIVE:  
Lori Jimenez is a 78 y.o. male seen for a follow up visit regarding the following:  
  
    
Chief Complaint Patient presents with  Results  
    Clite  
  
  
HPI: 
  
HPI patient is seen back following his nuclear stress test.  Did show apical and lateral ischemia. He does not have any angina. He is number of years out from his coronary bypass surgery. He said a recent drop in his LV function. Note some exertional dyspnea. He has problems with orthostatic hypotension. He has chronic adrenal insufficiency and is replacement therapy for that. He has chronic kidney disease creatinine of around 2-2. 2. 
  
Past Medical History, Past Surgical History, Family history, Social History, and Medications were all reviewed with the patient today and updated as necessary.  
  
       
Prior to Admission medications Medication Sig Start Date End Date Taking?  Authorizing Provider  
ibuprofen (ADVIL) 200 mg tablet Take  by mouth.     Yes Provider, Historical  
hydrocortisone (CORTEF) 10 mg tablet Take 10 mg by mouth two (2) times a day.     Yes Provider, Historical  
fludrocortisone (FLORINEF) 0.1 mg tablet Take 0.1 mg by mouth daily.     Yes Provider, Historical  
DULoxetine (CYMBALTA) 60 mg capsule Take 60 mg by mouth daily.     Yes Provider, Historical  
buPROPion SR (WELLBUTRIN SR) 100 mg SR tablet Take  by mouth daily.     Yes Provider, Historical  
sertraline (ZOLOFT) 25 mg tablet Take 25 mg by mouth daily.     Yes Provider, Historical  
atorvastatin (LIPITOR) 40 mg tablet Take 40 mg by mouth nightly.     Yes Other, MD Ami  
 What Type Of Note Output Would You Prefer (Optional)?: Bullet Format Has Your Skin Lesion Been Treated?: not been treated latanoprost (XALATAN) 0.005 % ophthalmic solution Administer 1 drop to both eyes nightly.     Yes Other, MD Ami  
folic acid 213 mcg tablet Take 800 mcg by mouth daily.     Yes Provider, Historical  
VITAMIN B COMPLEX-100 PO Take 1 tablet by mouth daily.     Yes Provider, Historical  
ascorbic acid (VITAMIN C) 500 mg tablet Take 500 mg by mouth daily.     Yes Other, MD Ami  
niacin CR (NIASPAN) 1,000 mg tablet Take 1,000 mg by mouth every evening.     Yes Esther, MD Ami  
fenofibrate nanocrystallized (TRICOR) 145 mg tablet Take 160 mg by mouth daily.     Yes Other, MD Ami  
nitroglycerin (NITROSTAT) 0.4 mg SL tablet by SubLINGual route every five (5) minutes as needed.     Yes Other, MD Ami  
amoxicillin (AMOXIL) 500 mg capsule Take 500 mg by mouth as needed. For dental appointments  9/20/10   Yes Esther, MD Ami  
  
     
Allergies Allergen Reactions  Levaquin [Levofloxacin] Other (comments)  
    Acute renal failure.  
  
    
Past Medical History:  
Diagnosis Date  Arthritis    
 CAD (coronary artery disease) 2005  
  W STENTS + CABGX4, NO MI  
 Chronic kidney disease    
  Acute renal failure.  Chronic ulcerative colitis ([facility] [address] 1998  
  WITH ILEOSTOMY  Glaucoma 2014  Hypertension MID 90S  Ill-defined condition    
  HLD  Psychiatric disorder    
  Depression  PUD (peptic ulcer disease)    
  ULCERATIVE COLITIS  Pulmonary edema 2005  
  AFTER CABG  
  
     
Past Surgical History:  
Procedure Laterality Date  ABDOMEN SURGERY PROC UNLISTED      
  ileostomy  CABG, ARTERY-VEIN, FOUR   2005  
  AND STENTS X 2 PRIOR TO CABG  
 HX COLOSTOMY   1998  
  ILEOSTOMY FOR ULCERATIVE COLITIS  
 HX UROLOGICAL      
 VASCULAR SURGERY PROCEDURE UNLIST      
  CABG  
  
     
Family History Problem Relation Age of Onset  Heart Disease Father    
 Heart Disease Mother    
 Other Daughter    
  
Social History  
  
     
Tobacco Use  Smoking status: Former Smoker Is This A New Presentation, Or A Follow-Up?: Growth     Years: 15.00  
    Last attempt to quit: 2000  
    Years since quittin.8  Smokeless tobacco: Never Used Substance Use Topics  Alcohol use: Yes  
    Alcohol/week: 11.7 standard drinks  
    Types: 14 Glasses of wine per week  
    Comment: WINE or cocktail 2/night  
  
  
ROS: 
  
Review of Systems Constitution: Negative. Cardiovascular: Positive for dyspnea on exertion. Negative for claudication. Respiratory: Negative. Neurological: Positive for light-headedness and loss of balance.  
  
  
  
PHYSICAL EXAM: 
  
Visit Vitals /62 Pulse 80 Ht 6' 2\" (1.88 m) Wt 189 lb 4.8 oz (85.9 kg) BMI 24.30 kg/m²  
  
  
  
   
Wt Readings from Last 3 Encounters:  
20 189 lb 4.8 oz (85.9 kg) 20 184 lb (83.5 kg) 19 184 lb (83.5 kg)  
  
   
BP Readings from Last 3 Encounters:  
20 116/62  
20 108/66  
19 120/66  
  
  
  
Physical Exam  
Constitutional: He appears well-developed and well-nourished. Cardiovascular: Normal rate, regular rhythm and normal heart sounds. Exam reveals no gallop. Skin: Skin is warm and dry.  
  
  
Medical problems and test results were reviewed with the patient today.  
  
  
     
Lab Results Component Value Date/Time  
  BUN 27 (H) 2020 12:46 PM  
  
     
Lab Results Component Value Date/Time  
  Creatinine (POC) 1.6 (H) 2017 06:16 PM  
  Creatinine 1.60 (H) 2020 12:46 PM  
  
     
Lab Results Component Value Date/Time  
  Potassium 3.6 2020 12:46 PM  
  
  
  
No results found for: CHOL, CHOLPOCT, CHOLX, CHLST, CHOLV, HDL, HDLPOC, HDLP, LDL, LDLCPOC, LDLC, DLDLP, VLDLC, VLDL, TGLX, TRIGL, TRIGP, TGLPOCT, CHHD, CHHDX 
  
  
ASSESSMENT and PLAN 
  
Diagnoses and all orders for this visit: 
  
1. Atherosclerosis of native coronary artery of native heart without angina pectoris -     METABOLIC PANEL, BASIC; Future -     CBC WITH AUTOMATED DIFF; Future -     LEFT HEART CATH; Future   
2. Chronic kidney disease, stage III (moderate) ([facility]) -     METABOLIC PANEL, BASIC; Future -     CBC WITH AUTOMATED DIFF; Future -     LEFT HEART CATH; Future 
  
3. LV dysfunction 
  
4. S/P CABG x 4 sv-om1 sv-om2 sv-pda lima-lad BYPASS GRAFT ANGIOGRAPHY: The saphenous vein graft to the first obtuse 
marginal branch is widely patent with smooth graft narrowing proximally 
to about 20%. The remainder of the graft is normal with a clear 
anastomosis to the second obtuse marginal branch, which has good distal 
runoff in the mildly diseased native vessel. 
  
The saphenous vein graft to the first obtuse marginal branch is a large 
graft which is normal. There is a clear anastomosis to a large first 
obtuse marginal with good runoff into mildly diseased native vessel. 
  
Saphenous vein graft to the posterior descending branch of the right 
coronary is a large graft with mild irregularities and a clear 
anastomosis to a moderate caliber posterior descending branch with good 
distal runoff as well as good retrograde flow down the posterior 
descending branch and also into the distal right coronary proper all the 
way back to the mid vessel. 
  
The left internal mammary to the LAD is widely patent with a clear 
anastomosis to the mid LAD. There is good runoff around a moderate 
caliber, minimally irregular LAD which wraps around the apex. 
  
  
  
  
  
  
IMPRESSION: 
  
 I had a lengthy discussion with him about the risk versus benefits of a cardiac catheterization with possible revascularization exertional dyspnea could be related to the ED increase his LV function. Given the fact that some reversible change he might benefit from some revascularization. Again he does not have typical angina. Risk for cardiac catheterization slightly increased because of his chronic kidney disease. Procedure indication risks were reviewed.   He would like to go ahead and proceed with it. We will repeat some blood work today to be sure nothing is definitely changed from previous assessment. Will likely need to come in early for hydration. He is instructed to take his hydrocortisone and Florinef the morning of the procedure. He may need some additional IV  steroids prior to the cath. 
  
  
  
  
  
  
  
Margarito Fishman MD 
2/27/2020 
11:45 AM  
  
Electronically signed by Nikita Guerra MD at 02/27/20 4240 Note Details Nathan Banerjee MD File Time 02/27/20 6052 Author Type Physician Status Signed Last  Nikita Guerra MD Specialty Cardiology Office Visit on 2/27/2020 Detailed Report Note shared with patient

## (undated) DEVICE — SUTURE ETHLN SZ 2-0 L18IN NONABSORBABLE BLK L26MM FS 3/8 664G

## (undated) DEVICE — BANDAGE,GAUZE,BULKEE II,4.5"X4.1YD,STRL: Brand: MEDLINE

## (undated) DEVICE — BNDG,ELSTC,MATRIX,STRL,6"X5YD,LF,HOOK&LP: Brand: MEDLINE

## (undated) DEVICE — Device

## (undated) DEVICE — TRAY PREP DRY W/ PREM GLV 2 APPL 6 SPNG 2 UNDPD 1 OVERWRAP

## (undated) DEVICE — BNDG,ELSTC,MATRIX,STRL,3"X5YD,LF,HOOK&LP: Brand: MEDLINE

## (undated) DEVICE — AMC/4 ARTERIAL NEEDLE – 18GA X 2.75” (7CM): Brand: ARTERIAL NEEDLE

## (undated) DEVICE — HANDPIECE SET WITH COAXIAL HIGH FLOW TIP AND SUCTION TUBE: Brand: INTERPULSE

## (undated) DEVICE — DRAPE XR CASS L UNIV FIT ADH CLSR

## (undated) DEVICE — SOLUTION IRRIG 3000ML 0.9% SOD CHL FLX CONT 0797208] ICU MEDICAL INC]

## (undated) DEVICE — LOWER EXTREMITY: Brand: MEDLINE INDUSTRIES, INC.

## (undated) DEVICE — 7 DAY SILVER-COATED ANTIMICROBIAL BARRIER DRESSING: Brand: ACTICOAT 7  4" X 5"

## (undated) DEVICE — BASIC SINGLE BASIN-LF: Brand: MEDLINE INDUSTRIES, INC.

## (undated) DEVICE — NEEDLE SPNL 22GA L3.5IN BLK HUB S STL REG WALL FIT STYL W/